# Patient Record
Sex: FEMALE | Race: WHITE | NOT HISPANIC OR LATINO | Employment: OTHER | URBAN - METROPOLITAN AREA
[De-identification: names, ages, dates, MRNs, and addresses within clinical notes are randomized per-mention and may not be internally consistent; named-entity substitution may affect disease eponyms.]

---

## 2017-01-27 ENCOUNTER — HISTORICAL (OUTPATIENT)
Dept: RADIOLOGY | Facility: HOSPITAL | Age: 82
End: 2017-01-27

## 2018-10-10 ENCOUNTER — HISTORICAL (OUTPATIENT)
Dept: ADMINISTRATIVE | Facility: HOSPITAL | Age: 83
End: 2018-10-10

## 2018-10-10 ENCOUNTER — HISTORICAL (OUTPATIENT)
Dept: LAB | Facility: HOSPITAL | Age: 83
End: 2018-10-10

## 2018-10-10 LAB
ABS NEUT (OLG): 9.6
APPEARANCE, UA: ABNORMAL
BACTERIA #/AREA URNS AUTO: ABNORMAL /HPF
BILIRUB UR QL STRIP: ABNORMAL MG/DL
COLOR UR: YELLOW
ERYTHROCYTE [DISTWIDTH] IN BLOOD BY AUTOMATED COUNT: 14.5 % (ref 11.5–17)
GLUCOSE (UA): NEGATIVE MG/DL
HCT VFR BLD AUTO: 50.9 % (ref 37–47)
HGB BLD-MCNC: 15.6 GM/DL (ref 12–16)
HGB UR QL STRIP: ABNORMAL UNIT/L
KETONES UR QL STRIP: ABNORMAL MG/DL
LEUKOCYTE ESTERASE UR QL STRIP: ABNORMAL UNIT/L
LYMPHOCYTES # BLD AUTO: 2.8 X10(3)/MCL (ref 0.6–3.4)
LYMPHOCYTES NFR BLD AUTO: 19.6 % (ref 13–40)
MCH RBC QN AUTO: 30.3 PG (ref 27–31.2)
MCHC RBC AUTO-ENTMCNC: 31 GM/DL (ref 32–36)
MCV RBC AUTO: 99 FL (ref 80–94)
MONOCYTES # BLD AUTO: 1.8 X10(3)/MCL (ref 0–1.8)
MONOCYTES NFR BLD AUTO: 12.9 % (ref 0.1–24)
NEUTROPHILS NFR BLD AUTO: 67.5 % (ref 47–80)
NITRITE UR QL STRIP.AUTO: NEGATIVE
PH UR STRIP: 6.5 [PH]
PLATELET # BLD AUTO: 302 X10(3)/MCL (ref 130–400)
PMV BLD AUTO: 8.5 FL
PROT UR QL STRIP: ABNORMAL MG/DL
RBC # BLD AUTO: 5.15 X10(6)/MCL (ref 4.2–5.4)
RBC #/AREA URNS HPF: ABNORMAL /HPF
SP GR UR STRIP: 1.02
SQUAMOUS EPITHELIAL, UA: ABNORMAL /LPF
UROBILINOGEN UR STRIP-ACNC: 0.2 MG/DL
WBC # SPEC AUTO: 14.2 X10(3)/MCL (ref 4.5–11.5)
WBC #/AREA URNS AUTO: ABNORMAL /[HPF]

## 2018-10-12 LAB — FINAL CULTURE: NO GROWTH

## 2018-11-12 ENCOUNTER — HISTORICAL (OUTPATIENT)
Dept: ADMINISTRATIVE | Facility: HOSPITAL | Age: 83
End: 2018-11-12

## 2018-12-05 ENCOUNTER — HISTORICAL (OUTPATIENT)
Dept: ADMINISTRATIVE | Facility: HOSPITAL | Age: 83
End: 2018-12-05

## 2018-12-05 LAB
ABS NEUT (OLG): 4.3 X10(3)/MCL (ref 2.1–9.2)
ALBUMIN SERPL-MCNC: 3.9 GM/DL (ref 3.4–5)
ALBUMIN/GLOB SERPL: 1.26 {RATIO} (ref 1.5–2.5)
ALP SERPL-CCNC: 70 UNIT/L (ref 38–126)
ALT SERPL-CCNC: 11 UNIT/L (ref 7–52)
APPEARANCE, UA: CLEAR
AST SERPL-CCNC: 16 UNIT/L (ref 15–37)
BACTERIA #/AREA URNS AUTO: ABNORMAL /HPF
BILIRUB SERPL-MCNC: 0.5 MG/DL (ref 0.2–1)
BILIRUB UR QL STRIP: ABNORMAL MG/DL
BILIRUBIN DIRECT+TOT PNL SERPL-MCNC: 0.1 MG/DL (ref 0–0.5)
BILIRUBIN DIRECT+TOT PNL SERPL-MCNC: 0.4 MG/DL
BUN SERPL-MCNC: 12 MG/DL (ref 7–18)
CALCIUM SERPL-MCNC: 8.9 MG/DL (ref 8.5–10)
CHLORIDE SERPL-SCNC: 108 MMOL/L (ref 98–107)
CHOLEST SERPL-MCNC: 153 MG/DL (ref 0–200)
CHOLEST/HDLC SERPL: 2.2 {RATIO}
CO2 SERPL-SCNC: 28 MMOL/L (ref 21–32)
COLOR UR: YELLOW
CREAT SERPL-MCNC: 0.74 MG/DL (ref 0.6–1.3)
ERYTHROCYTE [DISTWIDTH] IN BLOOD BY AUTOMATED COUNT: 13.7 % (ref 11.5–17)
GLOBULIN SER-MCNC: 3.1 GM/DL (ref 1.2–3)
GLUCOSE (UA): NEGATIVE MG/DL
GLUCOSE SERPL-MCNC: 119 MG/DL (ref 74–106)
HCT VFR BLD AUTO: 44 % (ref 37–47)
HDLC SERPL-MCNC: 68 MG/DL (ref 35–60)
HGB BLD-MCNC: 13.6 GM/DL (ref 12–16)
HGB UR QL STRIP: ABNORMAL UNIT/L
KETONES UR QL STRIP: ABNORMAL MG/DL
LDLC SERPL CALC-MCNC: 61 MG/DL (ref 0–129)
LEUKOCYTE ESTERASE UR QL STRIP: NEGATIVE UNIT/L
LYMPHOCYTES # BLD AUTO: 3 X10(3)/MCL (ref 0.6–3.4)
LYMPHOCYTES NFR BLD AUTO: 36.6 % (ref 13–40)
MCH RBC QN AUTO: 30.8 PG (ref 27–31.2)
MCHC RBC AUTO-ENTMCNC: 31 GM/DL (ref 32–36)
MCV RBC AUTO: 100 FL (ref 80–94)
MONOCYTES # BLD AUTO: 0.8 X10(3)/MCL (ref 0.1–1.3)
MONOCYTES NFR BLD AUTO: 9.4 % (ref 0.1–24)
NEUTROPHILS NFR BLD AUTO: 54 % (ref 47–80)
NITRITE UR QL STRIP.AUTO: NEGATIVE
PH UR STRIP: 6.5 [PH]
PLATELET # BLD AUTO: 267 X10(3)/MCL (ref 130–400)
PMV BLD AUTO: 9.2 FL (ref 9.4–12.4)
POTASSIUM SERPL-SCNC: 4.5 MMOL/L (ref 3.5–5.1)
PROT SERPL-MCNC: 7 GM/DL (ref 6.4–8.2)
PROT UR QL STRIP: ABNORMAL MG/DL
RBC # BLD AUTO: 4.41 X10(6)/MCL (ref 4.2–5.4)
RBC #/AREA URNS HPF: ABNORMAL /HPF
SODIUM SERPL-SCNC: 139 MMOL/L (ref 136–145)
SP GR UR STRIP: 1.02
SQUAMOUS EPITHELIAL, UA: ABNORMAL /LPF
TRIGL SERPL-MCNC: 77 MG/DL (ref 30–150)
UROBILINOGEN UR STRIP-ACNC: 2 MG/DL
VLDLC SERPL CALC-MCNC: 15.4 MG/DL
WBC # SPEC AUTO: 8.1 X10(3)/MCL (ref 4.5–11.5)
WBC #/AREA URNS AUTO: ABNORMAL /[HPF]

## 2018-12-10 ENCOUNTER — HISTORICAL (OUTPATIENT)
Dept: ADMINISTRATIVE | Facility: HOSPITAL | Age: 83
End: 2018-12-10

## 2018-12-10 LAB
APPEARANCE, UA: ABNORMAL
BACTERIA #/AREA URNS AUTO: ABNORMAL /HPF
BILIRUB UR QL STRIP: NEGATIVE MG/DL
COLOR UR: YELLOW
GLUCOSE (UA): NEGATIVE MG/DL
HGB UR QL STRIP: ABNORMAL UNIT/L
KETONES UR QL STRIP: ABNORMAL MG/DL
LEUKOCYTE ESTERASE UR QL STRIP: NEGATIVE UNIT/L
NITRITE UR QL STRIP.AUTO: NEGATIVE
PH UR STRIP: 6.5 [PH]
PROT UR QL STRIP: NEGATIVE MG/DL
RBC #/AREA URNS HPF: ABNORMAL /HPF
SP GR UR STRIP: 1.01
SQUAMOUS EPITHELIAL, UA: ABNORMAL /LPF
UROBILINOGEN UR STRIP-ACNC: 1 MG/DL
WBC #/AREA URNS AUTO: ABNORMAL /[HPF]

## 2019-01-21 ENCOUNTER — HISTORICAL (OUTPATIENT)
Dept: RADIOLOGY | Facility: HOSPITAL | Age: 84
End: 2019-01-21

## 2019-03-11 ENCOUNTER — HISTORICAL (OUTPATIENT)
Dept: ADMINISTRATIVE | Facility: HOSPITAL | Age: 84
End: 2019-03-11

## 2019-12-17 ENCOUNTER — HISTORICAL (OUTPATIENT)
Dept: ADMINISTRATIVE | Facility: HOSPITAL | Age: 84
End: 2019-12-17

## 2019-12-17 LAB
ALBUMIN SERPL-MCNC: 3.9 GM/DL (ref 3.4–5)
ALBUMIN/GLOB SERPL: 1.08 {RATIO} (ref 1.5–2.5)
ALP SERPL-CCNC: 78 UNIT/L (ref 38–126)
ALT SERPL-CCNC: 14 UNIT/L (ref 7–52)
APPEARANCE, UA: ABNORMAL
AST SERPL-CCNC: 17 UNIT/L (ref 15–37)
BACTERIA #/AREA URNS AUTO: ABNORMAL /HPF
BILIRUB SERPL-MCNC: 0.4 MG/DL (ref 0.2–1)
BILIRUB UR QL STRIP: NEGATIVE MG/DL
BILIRUBIN DIRECT+TOT PNL SERPL-MCNC: 0 MG/DL (ref 0–0.5)
BILIRUBIN DIRECT+TOT PNL SERPL-MCNC: 0.4 MG/DL
BUN SERPL-MCNC: 15 MG/DL (ref 7–18)
CALCIUM SERPL-MCNC: 9.2 MG/DL (ref 8.5–10)
CHLORIDE SERPL-SCNC: 106 MMOL/L (ref 98–107)
CO2 SERPL-SCNC: 27 MMOL/L (ref 21–32)
COLOR UR: YELLOW
CREAT SERPL-MCNC: 0.93 MG/DL (ref 0.6–1.3)
CREAT UR-MCNC: 200 MG/DL
DEPRECATED CALCIDIOL+CALCIFEROL SERPL-MC: 56.1 NG/ML (ref 30–80)
EST. AVERAGE GLUCOSE BLD GHB EST-MCNC: 123 MG/DL
GLOBULIN SER-MCNC: 3.6 GM/DL (ref 1.2–3)
GLUCOSE (UA): NEGATIVE MG/DL
GLUCOSE SERPL-MCNC: 117 MG/DL (ref 74–106)
HBA1C MFR BLD: 5.9 % (ref 4.4–6.4)
HGB UR QL STRIP: ABNORMAL UNIT/L
KETONES UR QL STRIP: NEGATIVE MG/DL
LEUKOCYTE ESTERASE UR QL STRIP: NEGATIVE UNIT/L
MICROALBUMIN UR-MCNC: 80 MG/L
MICROALBUMIN/CREAT RATIO PNL UR: ABNORMAL MG/GM
NITRITE UR QL STRIP.AUTO: NEGATIVE
PH UR STRIP: 6 [PH]
POTASSIUM SERPL-SCNC: 4.9 MMOL/L (ref 3.5–5.1)
PROT SERPL-MCNC: 7.5 GM/DL (ref 6.4–8.2)
PROT UR QL STRIP: NEGATIVE MG/DL
RBC #/AREA URNS HPF: ABNORMAL /HPF
SODIUM SERPL-SCNC: 139 MMOL/L (ref 136–145)
SP GR UR STRIP: 1.02
SQUAMOUS EPITHELIAL, UA: ABNORMAL /LPF
UROBILINOGEN UR STRIP-ACNC: 0.2 MG/DL
WBC #/AREA URNS AUTO: ABNORMAL /[HPF]

## 2020-09-23 ENCOUNTER — HISTORICAL (OUTPATIENT)
Dept: ADMINISTRATIVE | Facility: HOSPITAL | Age: 85
End: 2020-09-23

## 2020-12-09 ENCOUNTER — HISTORICAL (OUTPATIENT)
Dept: RADIATION THERAPY | Facility: HOSPITAL | Age: 85
End: 2020-12-09

## 2020-12-17 ENCOUNTER — HISTORICAL (OUTPATIENT)
Dept: RADIATION THERAPY | Facility: HOSPITAL | Age: 85
End: 2020-12-17

## 2020-12-21 ENCOUNTER — HISTORICAL (OUTPATIENT)
Dept: ADMINISTRATIVE | Facility: HOSPITAL | Age: 85
End: 2020-12-21

## 2020-12-21 LAB
ALBUMIN SERPL-MCNC: 3.7 GM/DL (ref 3.4–5)
ALBUMIN/GLOB SERPL: 1.16 {RATIO} (ref 1.5–2.5)
ALP SERPL-CCNC: 102 UNIT/L (ref 38–126)
ALT SERPL-CCNC: 13 UNIT/L (ref 7–52)
AST SERPL-CCNC: 19 UNIT/L (ref 15–37)
BILIRUB SERPL-MCNC: 0.5 MG/DL (ref 0.2–1)
BILIRUBIN DIRECT+TOT PNL SERPL-MCNC: 0.2 MG/DL (ref 0–0.5)
BILIRUBIN DIRECT+TOT PNL SERPL-MCNC: 0.3 MG/DL
BUN SERPL-MCNC: 10 MG/DL (ref 7–18)
CALCIUM SERPL-MCNC: 9.3 MG/DL (ref 8.5–10)
CHLORIDE SERPL-SCNC: 106 MMOL/L (ref 98–107)
CHOLEST SERPL-MCNC: 159 MG/DL (ref 0–200)
CHOLEST/HDLC SERPL: 2 {RATIO}
CO2 SERPL-SCNC: 28 MMOL/L (ref 21–32)
CREAT SERPL-MCNC: 0.65 MG/DL (ref 0.6–1.3)
EST. AVERAGE GLUCOSE BLD GHB EST-MCNC: 128 MG/DL
GLOBULIN SER-MCNC: 3.2 GM/DL (ref 1.2–3)
GLUCOSE SERPL-MCNC: 100 MG/DL (ref 74–106)
HBA1C MFR BLD: 6.1 % (ref 4.4–6.4)
HDLC SERPL-MCNC: 78 MG/DL (ref 35–60)
LDLC SERPL CALC-MCNC: 66 MG/DL (ref 0–129)
POTASSIUM SERPL-SCNC: 5.1 MMOL/L (ref 3.5–5.1)
PROT SERPL-MCNC: 6.9 GM/DL (ref 6.4–8.2)
SODIUM SERPL-SCNC: 141 MMOL/L (ref 136–145)
TRIGL SERPL-MCNC: 121 MG/DL (ref 30–150)
VLDLC SERPL CALC-MCNC: 24.2 MG/DL

## 2020-12-24 ENCOUNTER — HISTORICAL (OUTPATIENT)
Dept: RADIOLOGY | Facility: HOSPITAL | Age: 85
End: 2020-12-24

## 2020-12-28 ENCOUNTER — HISTORICAL (OUTPATIENT)
Dept: RADIATION THERAPY | Facility: HOSPITAL | Age: 85
End: 2020-12-28

## 2020-12-29 ENCOUNTER — HISTORICAL (OUTPATIENT)
Dept: RADIATION THERAPY | Facility: HOSPITAL | Age: 85
End: 2020-12-29

## 2020-12-31 ENCOUNTER — HISTORICAL (OUTPATIENT)
Dept: RADIATION THERAPY | Facility: HOSPITAL | Age: 85
End: 2020-12-31

## 2021-01-04 ENCOUNTER — HISTORICAL (OUTPATIENT)
Dept: RADIATION THERAPY | Facility: HOSPITAL | Age: 86
End: 2021-01-04

## 2021-01-05 ENCOUNTER — HISTORICAL (OUTPATIENT)
Dept: RADIATION THERAPY | Facility: HOSPITAL | Age: 86
End: 2021-01-05

## 2021-01-06 ENCOUNTER — HISTORICAL (OUTPATIENT)
Dept: RADIATION THERAPY | Facility: HOSPITAL | Age: 86
End: 2021-01-06

## 2021-01-08 ENCOUNTER — HISTORICAL (OUTPATIENT)
Dept: RADIATION THERAPY | Facility: HOSPITAL | Age: 86
End: 2021-01-08

## 2021-01-11 ENCOUNTER — HISTORICAL (OUTPATIENT)
Dept: RADIATION THERAPY | Facility: HOSPITAL | Age: 86
End: 2021-01-11

## 2021-04-21 ENCOUNTER — HISTORICAL (OUTPATIENT)
Dept: ADMINISTRATIVE | Facility: HOSPITAL | Age: 86
End: 2021-04-21

## 2021-04-21 LAB — POC CREATININE: 0.8 MG/DL (ref 0.6–1.3)

## 2021-04-23 ENCOUNTER — HISTORICAL (OUTPATIENT)
Dept: RADIATION THERAPY | Facility: HOSPITAL | Age: 86
End: 2021-04-23

## 2021-07-12 ENCOUNTER — HISTORICAL (OUTPATIENT)
Dept: ADMINISTRATIVE | Facility: HOSPITAL | Age: 86
End: 2021-07-12

## 2021-07-12 LAB
ABS NEUT (OLG): 5.18 X10(3)/MCL (ref 2.1–9.2)
BASOPHILS # BLD AUTO: 0.1 X10(3)/MCL (ref 0–0.2)
BASOPHILS NFR BLD AUTO: 1 %
EOSINOPHIL # BLD AUTO: 0.3 X10(3)/MCL (ref 0–0.9)
EOSINOPHIL NFR BLD AUTO: 3 %
ERYTHROCYTE [DISTWIDTH] IN BLOOD BY AUTOMATED COUNT: 16 % (ref 11.5–17)
HCT VFR BLD AUTO: 40.1 % (ref 37–47)
HGB BLD-MCNC: 12.4 GM/DL (ref 12–16)
LYMPHOCYTES # BLD AUTO: 2.1 X10(3)/MCL (ref 0.6–4.6)
LYMPHOCYTES NFR BLD AUTO: 25 %
MCH RBC QN AUTO: 29.4 PG (ref 27–31)
MCHC RBC AUTO-ENTMCNC: 30.9 GM/DL (ref 33–36)
MCV RBC AUTO: 95 FL (ref 80–94)
MONOCYTES # BLD AUTO: 0.8 X10(3)/MCL (ref 0.1–1.3)
MONOCYTES NFR BLD AUTO: 10 %
NEUTROPHILS # BLD AUTO: 5.18 X10(3)/MCL (ref 2.1–9.2)
NEUTROPHILS NFR BLD AUTO: 61 %
PLATELET # BLD AUTO: 315 X10(3)/MCL (ref 130–400)
PMV BLD AUTO: 9.5 FL (ref 9.4–12.4)
RBC # BLD AUTO: 4.22 X10(6)/MCL (ref 4.2–5.4)
VIT B12 SERPL-MCNC: 1174 PG/ML (ref 213–816)
WBC # SPEC AUTO: 8.5 X10(3)/MCL (ref 4.5–11.5)

## 2021-08-03 ENCOUNTER — HISTORICAL (OUTPATIENT)
Dept: ADMINISTRATIVE | Facility: HOSPITAL | Age: 86
End: 2021-08-03

## 2021-08-03 LAB
BUN SERPL-MCNC: 12 MG/DL (ref 7–18)
CALCIUM SERPL-MCNC: 9.3 MG/DL (ref 8.5–10)
CHLORIDE SERPL-SCNC: 104 MMOL/L (ref 98–107)
CO2 SERPL-SCNC: 26 MMOL/L (ref 21–32)
CREAT SERPL-MCNC: 0.65 MG/DL (ref 0.6–1.3)
CREAT/UREA NIT SERPL: 18.5
FERRITIN SERPL-MCNC: 56.81 NG/ML (ref 4.63–204)
GLUCOSE SERPL-MCNC: 116 MG/DL (ref 74–106)
POTASSIUM SERPL-SCNC: 6 MMOL/L (ref 3.5–5.1)
SODIUM SERPL-SCNC: 138 MMOL/L (ref 136–145)

## 2021-10-08 ENCOUNTER — HISTORICAL (OUTPATIENT)
Dept: RADIOLOGY | Facility: HOSPITAL | Age: 86
End: 2021-10-08

## 2021-10-13 ENCOUNTER — HISTORICAL (OUTPATIENT)
Dept: RADIATION THERAPY | Facility: HOSPITAL | Age: 86
End: 2021-10-13

## 2021-11-09 ENCOUNTER — HISTORICAL (OUTPATIENT)
Dept: ADMINISTRATIVE | Facility: HOSPITAL | Age: 86
End: 2021-11-09

## 2021-11-09 LAB
BUN SERPL-MCNC: 16.3 MG/DL (ref 9.8–20.1)
CALCIUM SERPL-MCNC: 8.8 MG/DL (ref 8.7–10.5)
CHLORIDE SERPL-SCNC: 108 MMOL/L (ref 98–107)
CO2 SERPL-SCNC: 27 MMOL/L (ref 23–31)
CREAT SERPL-MCNC: 0.74 MG/DL (ref 0.55–1.02)
CREAT/UREA NIT SERPL: 22
GLUCOSE SERPL-MCNC: 87 MG/DL (ref 82–115)
POTASSIUM SERPL-SCNC: 3.9 MMOL/L (ref 3.5–5.1)
SODIUM SERPL-SCNC: 144 MMOL/L (ref 136–145)

## 2021-12-13 LAB
INFLUENZA A ANTIGEN, POC: NEGATIVE
INFLUENZA B ANTIGEN, POC: NEGATIVE
SARS-COV-2 RNA RESP QL NAA+PROBE: NEGATIVE

## 2021-12-21 LAB
APPEARANCE, UA: CLEAR
BACTERIA SPEC CULT: NORMAL
BILIRUB UR QL STRIP: NEGATIVE
COLOR UR: YELLOW
GLUCOSE (UA): NEGATIVE
HGB UR QL STRIP: NEGATIVE
KETONES UR QL STRIP: NEGATIVE
LEUKOCYTE ESTERASE UR QL STRIP: NEGATIVE
NITRITE UR QL STRIP: NEGATIVE
PH UR STRIP: 7 [PH] (ref 5–9)
PROT UR QL STRIP: NEGATIVE
RBC #/AREA URNS HPF: NORMAL /[HPF]
SARS-COV-2 AG RESP QL IA.RAPID: NEGATIVE
SP GR UR STRIP: 1.02 (ref 1–1.03)
SQUAMOUS EPITHELIAL, UA: NORMAL
UROBILINOGEN UR STRIP-ACNC: 1
WBC #/AREA URNS HPF: NORMAL /HPF

## 2021-12-22 LAB
EST. AVERAGE GLUCOSE BLD GHB EST-MCNC: 131.2 MG/DL
HBA1C MFR BLD: 6.2 %

## 2021-12-28 ENCOUNTER — HISTORICAL (OUTPATIENT)
Dept: ADMINISTRATIVE | Facility: HOSPITAL | Age: 86
End: 2021-12-28

## 2022-01-06 LAB
ABS NEUT (OLG): 1.1 X10(3)/MCL (ref 2.1–9.2)
ALBUMIN SERPL-MCNC: 2.2 GM/DL (ref 3.4–4.8)
ALBUMIN/GLOB SERPL: 0.5 RATIO (ref 1.1–2)
ALP SERPL-CCNC: 88 UNIT/L (ref 40–150)
ALT SERPL-CCNC: 13 UNIT/L (ref 0–55)
AST SERPL-CCNC: 27 UNIT/L (ref 5–34)
BASOPHILS # BLD AUTO: 0 X10(3)/MCL (ref 0–0.2)
BASOPHILS NFR BLD AUTO: 0 %
BILIRUB SERPL-MCNC: 0.2 MG/DL
BILIRUBIN DIRECT+TOT PNL SERPL-MCNC: 0.1 MG/DL (ref 0–0.5)
BILIRUBIN DIRECT+TOT PNL SERPL-MCNC: 0.1 MG/DL (ref 0–0.8)
BUN SERPL-MCNC: 12 MG/DL (ref 9.8–20.1)
CALCIUM SERPL-MCNC: 9 MG/DL (ref 8.7–10.5)
CHLORIDE SERPL-SCNC: 101 MMOL/L (ref 98–107)
CO2 SERPL-SCNC: 29 MMOL/L (ref 23–31)
CREAT SERPL-MCNC: 0.72 MG/DL (ref 0.55–1.02)
EOSINOPHIL # BLD AUTO: 0.2 X10(3)/MCL (ref 0–0.9)
EOSINOPHIL NFR BLD AUTO: 5 %
ERYTHROCYTE [DISTWIDTH] IN BLOOD BY AUTOMATED COUNT: 15.5 % (ref 11.5–17)
GLOBULIN SER-MCNC: 4.3 GM/DL (ref 2.4–3.5)
GLUCOSE SERPL-MCNC: 96 MG/DL (ref 82–115)
HCT VFR BLD AUTO: 36.4 % (ref 37–47)
HGB BLD-MCNC: 11 GM/DL (ref 12–16)
LYMPHOCYTES # BLD AUTO: 1.5 X10(3)/MCL (ref 0.6–4.6)
LYMPHOCYTES NFR BLD AUTO: 48 %
MAGNESIUM SERPL-MCNC: 2.1 MG/DL (ref 1.6–2.6)
MCH RBC QN AUTO: 27.7 PG (ref 27–31)
MCHC RBC AUTO-ENTMCNC: 30.2 GM/DL (ref 33–36)
MCV RBC AUTO: 91.7 FL (ref 80–94)
MONOCYTES # BLD AUTO: 0.3 X10(3)/MCL (ref 0.1–1.3)
MONOCYTES NFR BLD AUTO: 11 %
NEUTROPHILS # BLD AUTO: 1.1 X10(3)/MCL (ref 1.4–7.9)
NEUTROPHILS NFR BLD AUTO: 36 %
PLATELET # BLD AUTO: 346 X10(3)/MCL (ref 130–400)
PMV BLD AUTO: 9.3 FL (ref 9.4–12.4)
POTASSIUM SERPL-SCNC: 4.2 MMOL/L (ref 3.5–5.1)
PROT SERPL-MCNC: 6.5 GM/DL (ref 5.8–7.6)
RBC # BLD AUTO: 3.97 X10(6)/MCL (ref 4.2–5.4)
SODIUM SERPL-SCNC: 136 MMOL/L (ref 136–145)
WBC # SPEC AUTO: 3.1 X10(3)/MCL (ref 4.5–11.5)

## 2022-01-10 LAB
ABS NEUT (OLG): 2.02 X10(3)/MCL (ref 2.1–9.2)
ALBUMIN SERPL-MCNC: 2.4 GM/DL (ref 3.4–4.8)
ALBUMIN/GLOB SERPL: 0.6 RATIO (ref 1.1–2)
ALP SERPL-CCNC: 85 UNIT/L (ref 40–150)
ALT SERPL-CCNC: 14 UNIT/L (ref 0–55)
AST SERPL-CCNC: 15 UNIT/L (ref 5–34)
BASOPHILS # BLD AUTO: 0 X10(3)/MCL (ref 0–0.2)
BASOPHILS NFR BLD AUTO: 1 %
BILIRUB SERPL-MCNC: 0.3 MG/DL
BILIRUBIN DIRECT+TOT PNL SERPL-MCNC: 0.1 MG/DL (ref 0–0.8)
BILIRUBIN DIRECT+TOT PNL SERPL-MCNC: 0.2 MG/DL (ref 0–0.5)
BUN SERPL-MCNC: 11.8 MG/DL (ref 9.8–20.1)
CALCIUM SERPL-MCNC: 8.6 MG/DL (ref 8.7–10.5)
CHLORIDE SERPL-SCNC: 108 MMOL/L (ref 98–107)
CO2 SERPL-SCNC: 25 MMOL/L (ref 23–31)
CREAT SERPL-MCNC: 0.62 MG/DL (ref 0.55–1.02)
EOSINOPHIL # BLD AUTO: 0.2 X10(3)/MCL (ref 0–0.9)
EOSINOPHIL NFR BLD AUTO: 5 %
ERYTHROCYTE [DISTWIDTH] IN BLOOD BY AUTOMATED COUNT: 15.7 % (ref 11.5–17)
GLOBULIN SER-MCNC: 3.8 GM/DL (ref 2.4–3.5)
GLUCOSE SERPL-MCNC: 88 MG/DL (ref 82–115)
HCT VFR BLD AUTO: 35.5 % (ref 37–47)
HGB BLD-MCNC: 10.5 GM/DL (ref 12–16)
LYMPHOCYTES # BLD AUTO: 1.8 X10(3)/MCL (ref 0.6–4.6)
LYMPHOCYTES NFR BLD AUTO: 40 %
MCH RBC QN AUTO: 27.2 PG (ref 27–31)
MCHC RBC AUTO-ENTMCNC: 29.6 GM/DL (ref 33–36)
MCV RBC AUTO: 92 FL (ref 80–94)
MONOCYTES # BLD AUTO: 0.5 X10(3)/MCL (ref 0.1–1.3)
MONOCYTES NFR BLD AUTO: 11 %
NEUTROPHILS # BLD AUTO: 2.02 X10(3)/MCL (ref 1.4–7.9)
NEUTROPHILS NFR BLD AUTO: 43 %
PLATELET # BLD AUTO: 357 X10(3)/MCL (ref 130–400)
PMV BLD AUTO: 9.1 FL (ref 9.4–12.4)
POTASSIUM SERPL-SCNC: 3.7 MMOL/L (ref 3.5–5.1)
PROT SERPL-MCNC: 6.2 GM/DL (ref 5.8–7.6)
RBC # BLD AUTO: 3.86 X10(6)/MCL (ref 4.2–5.4)
SODIUM SERPL-SCNC: 142 MMOL/L (ref 136–145)
WBC # SPEC AUTO: 4.7 X10(3)/MCL (ref 4.5–11.5)

## 2022-02-04 ENCOUNTER — HISTORICAL (OUTPATIENT)
Dept: RADIOLOGY | Facility: HOSPITAL | Age: 87
End: 2022-02-04

## 2022-02-04 LAB — CBG: 99 (ref 70–115)

## 2022-02-07 ENCOUNTER — HISTORICAL (OUTPATIENT)
Dept: ADMINISTRATIVE | Facility: HOSPITAL | Age: 87
End: 2022-02-07

## 2022-02-10 ENCOUNTER — HISTORICAL (OUTPATIENT)
Dept: RADIATION THERAPY | Facility: HOSPITAL | Age: 87
End: 2022-02-10

## 2022-03-15 ENCOUNTER — HISTORICAL (OUTPATIENT)
Dept: ADMINISTRATIVE | Facility: HOSPITAL | Age: 87
End: 2022-03-15

## 2022-03-15 LAB
ABS NEUT (OLG): 3.42 (ref 2.1–9.2)
ALBUMIN SERPL-MCNC: 3.3 G/DL (ref 3.4–4.8)
ALBUMIN/GLOB SERPL: 0.7 {RATIO} (ref 1.1–2)
ALP SERPL-CCNC: 96 U/L (ref 40–150)
ALT SERPL-CCNC: 13 U/L (ref 0–55)
AST SERPL-CCNC: 18 U/L (ref 5–34)
BASOPHILS # BLD AUTO: 0 10*3/UL (ref 0–0.2)
BASOPHILS NFR BLD AUTO: 0 %
BILIRUB SERPL-MCNC: 0.6 MG/DL
BILIRUBIN DIRECT+TOT PNL SERPL-MCNC: 0.3 (ref 0–0.5)
BILIRUBIN DIRECT+TOT PNL SERPL-MCNC: 0.3 (ref 0–0.8)
BUN SERPL-MCNC: 11.1 MG/DL (ref 9.8–20.1)
CALCIUM SERPL-MCNC: 9.7 MG/DL (ref 8.7–10.5)
CHLORIDE SERPL-SCNC: 108 MMOL/L (ref 98–107)
CK SERPL-CCNC: 53 U/L (ref 29–168)
CO2 SERPL-SCNC: 26 MMOL/L (ref 23–31)
CREAT SERPL-MCNC: 0.71 MG/DL (ref 0.55–1.02)
EOSINOPHIL # BLD AUTO: 0 10*3/UL (ref 0–0.9)
EOSINOPHIL NFR BLD AUTO: 1 %
ERYTHROCYTE [DISTWIDTH] IN BLOOD BY AUTOMATED COUNT: 17.2 % (ref 11.5–17)
GLOBULIN SER-MCNC: 4.6 G/DL (ref 2.4–3.5)
GLUCOSE SERPL-MCNC: 106 MG/DL (ref 82–115)
HCT VFR BLD AUTO: 38.7 % (ref 37–47)
HEMOLYSIS INTERF INDEX SERPL-ACNC: 3
HGB BLD-MCNC: 12.1 G/DL (ref 12–16)
ICTERIC INTERF INDEX SERPL-ACNC: 1
LIPEMIC INTERF INDEX SERPL-ACNC: 1
LYMPHOCYTES # BLD AUTO: 1.8 10*3/UL (ref 0.6–4.6)
LYMPHOCYTES NFR BLD AUTO: 31 %
MANUAL DIFF? (OHS): NO
MCH RBC QN AUTO: 28.4 PG (ref 27–31)
MCHC RBC AUTO-ENTMCNC: 31.3 G/DL (ref 33–36)
MCV RBC AUTO: 90.8 FL (ref 80–94)
MONOCYTES # BLD AUTO: 0.5 10*3/UL (ref 0.1–1.3)
MONOCYTES NFR BLD AUTO: 9 %
NEUTROPHILS # BLD AUTO: 3.42 10*3/UL (ref 2.1–9.2)
NEUTROPHILS NFR BLD AUTO: 58 %
PLATELET # BLD AUTO: 358 10*3/UL (ref 130–400)
PMV BLD AUTO: 10.5 FL (ref 9.4–12.4)
POTASSIUM SERPL-SCNC: 3.9 MMOL/L (ref 3.5–5.1)
PROT SERPL-MCNC: 7.9 G/DL (ref 5.8–7.6)
RBC # BLD AUTO: 4.26 10*6/UL (ref 4.2–5.4)
SODIUM SERPL-SCNC: 145 MMOL/L (ref 136–145)
WBC # SPEC AUTO: 5.9 10*3/UL (ref 4.5–11.5)

## 2022-03-16 ENCOUNTER — HOSPITAL ENCOUNTER (OUTPATIENT)
Dept: MEDSURG UNIT | Facility: HOSPITAL | Age: 87
End: 2022-03-18
Attending: INTERNAL MEDICINE | Admitting: INTERNAL MEDICINE

## 2022-03-16 ENCOUNTER — HISTORICAL (OUTPATIENT)
Dept: ADMINISTRATIVE | Facility: HOSPITAL | Age: 87
End: 2022-03-16

## 2022-03-16 LAB — BNP BLD-MCNC: 673 PG/ML

## 2022-03-17 ENCOUNTER — HISTORICAL (OUTPATIENT)
Dept: ADMINISTRATIVE | Facility: HOSPITAL | Age: 87
End: 2022-03-17

## 2022-03-17 LAB
ABS NEUT (OLG): 7.46 (ref 2.1–9.2)
ALBUMIN SERPL-MCNC: 2.8 G/DL (ref 3.4–4.8)
ALBUMIN/GLOB SERPL: 0.8 {RATIO} (ref 1.1–2)
ALP SERPL-CCNC: 90 U/L (ref 40–150)
ALT SERPL-CCNC: 15 U/L (ref 0–55)
AST SERPL-CCNC: 25 U/L (ref 5–34)
BASOPHILS # BLD AUTO: 0 10*3/UL (ref 0–0.2)
BASOPHILS NFR BLD AUTO: 0 %
BILIRUB SERPL-MCNC: 0.6 MG/DL
BILIRUBIN DIRECT+TOT PNL SERPL-MCNC: 0.3 (ref 0–0.5)
BILIRUBIN DIRECT+TOT PNL SERPL-MCNC: 0.3 (ref 0–0.8)
BNP BLD-MCNC: 1059.7 PG/ML
BUN SERPL-MCNC: 12 MG/DL (ref 9.8–20.1)
CALCIUM SERPL-MCNC: 8.6 MG/DL (ref 8.7–10.5)
CHLORIDE SERPL-SCNC: 109 MMOL/L (ref 98–107)
CO2 SERPL-SCNC: 23 MMOL/L (ref 23–31)
CREAT SERPL-MCNC: 0.71 MG/DL (ref 0.55–1.02)
ERYTHROCYTE [DISTWIDTH] IN BLOOD BY AUTOMATED COUNT: 17 % (ref 11.5–17)
GLOBULIN SER-MCNC: 3.4 G/DL (ref 2.4–3.5)
GLUCOSE SERPL-MCNC: 92 MG/DL (ref 82–115)
HCT VFR BLD AUTO: 35.7 % (ref 37–47)
HEMOLYSIS INTERF INDEX SERPL-ACNC: 5
HEMOLYSIS INTERF INDEX SERPL-ACNC: 5
HGB BLD-MCNC: 11 G/DL (ref 12–16)
ICTERIC INTERF INDEX SERPL-ACNC: 0
IMM GRANULOCYTES # BLD AUTO: 0.01 10*3/UL (ref 0–0.02)
IMM GRANULOCYTES NFR BLD AUTO: 0.1 % (ref 0–0.43)
LIPEMIC INTERF INDEX SERPL-ACNC: <0
LYMPHOCYTES # BLD AUTO: 0.6 10*3/UL (ref 0.6–4.6)
LYMPHOCYTES NFR BLD AUTO: 7 %
MAGNESIUM SERPL-MCNC: 1.9 MG/DL (ref 1.6–2.6)
MANUAL DIFF? (OHS): NO
MCH RBC QN AUTO: 27.6 PG (ref 27–31)
MCHC RBC AUTO-ENTMCNC: 30.8 G/DL (ref 33–36)
MCV RBC AUTO: 89.5 FL (ref 80–94)
MONOCYTES # BLD AUTO: 0.6 10*3/UL (ref 0.1–1.3)
MONOCYTES NFR BLD AUTO: 7 %
NEUTROPHILS # BLD AUTO: 7.46 10*3/UL (ref 1.4–7.9)
NEUTROPHILS NFR BLD AUTO: 86 %
PLATELET # BLD AUTO: 299 10*3/UL (ref 130–400)
PMV BLD AUTO: 9.7 FL (ref 9.4–12.4)
POTASSIUM SERPL-SCNC: 3.6 MMOL/L (ref 3.5–5.1)
PROT SERPL-MCNC: 6.2 G/DL (ref 5.8–7.6)
RBC # BLD AUTO: 3.99 10*6/UL (ref 4.2–5.4)
SODIUM SERPL-SCNC: 144 MMOL/L (ref 136–145)
TROPONIN I SERPL-MCNC: 0.48 NG/ML (ref 0–0.04)
TROPONIN I SERPL-MCNC: 0.5 NG/ML (ref 0–0.04)
WBC # SPEC AUTO: 8.7 10*3/UL (ref 4.5–11.5)

## 2022-03-18 LAB
BNP BLD-MCNC: 1156.7 PG/ML
BUN SERPL-MCNC: 13.4 MG/DL (ref 9.8–20.1)
CALCIUM SERPL-MCNC: 8.9 MG/DL (ref 8.7–10.5)
CHLORIDE SERPL-SCNC: 104 MMOL/L (ref 98–107)
CO2 SERPL-SCNC: 28 MMOL/L (ref 23–31)
CREAT SERPL-MCNC: 0.82 MG/DL (ref 0.55–1.02)
CREAT/UREA NIT SERPL: 16
GLUCOSE SERPL-MCNC: 138 MG/DL (ref 82–115)
HEMOLYSIS INTERF INDEX SERPL-ACNC: 2
HEMOLYSIS INTERF INDEX SERPL-ACNC: 2
ICTERIC INTERF INDEX SERPL-ACNC: 0
LIPEMIC INTERF INDEX SERPL-ACNC: 2
MAGNESIUM SERPL-MCNC: 1.9 MG/DL (ref 1.6–2.6)
POTASSIUM SERPL-SCNC: 3.8 MMOL/L (ref 3.5–5.1)
SODIUM SERPL-SCNC: 145 MMOL/L (ref 136–145)
TROPONIN I SERPL-MCNC: 0.26 NG/ML (ref 0–0.04)

## 2022-03-19 LAB
BNP BLD-MCNC: 574.8 PG/ML
BUN SERPL-MCNC: 22.6 MG/DL (ref 9.8–20.1)
CALCIUM SERPL-MCNC: 9.6 MG/DL (ref 8.7–10.5)
CHLORIDE SERPL-SCNC: 100 MMOL/L (ref 98–107)
CO2 SERPL-SCNC: 29 MMOL/L (ref 23–31)
CREAT SERPL-MCNC: 0.94 MG/DL (ref 0.55–1.02)
CREAT/UREA NIT SERPL: 24
GLUCOSE SERPL-MCNC: 117 MG/DL (ref 82–115)
HEMOLYSIS INTERF INDEX SERPL-ACNC: 1
ICTERIC INTERF INDEX SERPL-ACNC: 1
LIPEMIC INTERF INDEX SERPL-ACNC: 1
POTASSIUM SERPL-SCNC: 3.7 MMOL/L (ref 3.5–5.1)
SODIUM SERPL-SCNC: 144 MMOL/L (ref 136–145)

## 2022-03-21 ENCOUNTER — HISTORICAL (OUTPATIENT)
Dept: ADMINISTRATIVE | Facility: HOSPITAL | Age: 87
End: 2022-03-21

## 2022-03-23 ENCOUNTER — HISTORICAL (OUTPATIENT)
Dept: ADMINISTRATIVE | Facility: HOSPITAL | Age: 87
End: 2022-03-23

## 2022-03-23 LAB
BNP BLD-MCNC: 208.9 PG/ML
BUN SERPL-MCNC: 32.9 MG/DL (ref 9.8–20.1)
CALCIUM SERPL-MCNC: 9.2 MG/DL (ref 8.7–10.5)
CHLORIDE SERPL-SCNC: 96 MMOL/L (ref 98–107)
CO2 SERPL-SCNC: 30 MMOL/L (ref 23–31)
CREAT SERPL-MCNC: 0.85 MG/DL (ref 0.55–1.02)
CREAT/UREA NIT SERPL: 39
GLUCOSE SERPL-MCNC: 132 MG/DL (ref 82–115)
HEMOLYSIS INTERF INDEX SERPL-ACNC: 1
HEMOLYSIS INTERF INDEX SERPL-ACNC: 1
ICTERIC INTERF INDEX SERPL-ACNC: 0
INR PPP: 1.04 (ref 2–3)
LIPEMIC INTERF INDEX SERPL-ACNC: 0
MAGNESIUM SERPL-MCNC: 2.2 MG/DL (ref 1.6–2.6)
POTASSIUM SERPL-SCNC: 4.5 MMOL/L (ref 3.5–5.1)
PROTHROMBIN TIME: 10.9 S (ref 9.3–11.4)
SODIUM SERPL-SCNC: 137 MMOL/L (ref 136–145)

## 2022-03-29 LAB
ABS NEUT (OLG): 4.69 (ref 2.1–9.2)
ALBUMIN SERPL-MCNC: 2.4 G/DL (ref 3.4–4.8)
ALBUMIN/GLOB SERPL: 0.8 {RATIO} (ref 1.1–2)
ALP SERPL-CCNC: 92 U/L (ref 40–150)
ALT SERPL-CCNC: 18 U/L (ref 0–55)
AST SERPL-CCNC: 15 U/L (ref 5–34)
BASOPHILS # BLD AUTO: 0 10*3/UL (ref 0–0.2)
BASOPHILS NFR BLD AUTO: 0 %
BILIRUB SERPL-MCNC: 0.6 MG/DL
BUN SERPL-MCNC: 24.5 MG/DL (ref 9.8–20.1)
CALCIUM SERPL-MCNC: 8.7 MG/DL (ref 8.7–10.5)
CHLORIDE SERPL-SCNC: 98 MMOL/L (ref 98–107)
CO2 SERPL-SCNC: 27 MMOL/L (ref 23–31)
CREAT SERPL-MCNC: 0.9 MG/DL (ref 0.55–1.02)
EOSINOPHIL # BLD AUTO: 0.2 10*3/UL (ref 0–0.9)
EOSINOPHIL NFR BLD AUTO: 2 %
ERYTHROCYTE [DISTWIDTH] IN BLOOD BY AUTOMATED COUNT: 15.8 % (ref 11.5–17)
GLOBULIN SER-MCNC: 3.2 G/DL (ref 2.4–3.5)
GLUCOSE SERPL-MCNC: 100 MG/DL (ref 82–115)
HCT VFR BLD AUTO: 42.6 % (ref 37–47)
HEMOLYSIS INTERF INDEX SERPL-ACNC: 3
HEMOLYSIS INTERF INDEX SERPL-ACNC: 3
HGB BLD-MCNC: 13.1 G/DL (ref 12–16)
ICTERIC INTERF INDEX SERPL-ACNC: 1
IMM GRANULOCYTES # BLD AUTO: 0.01 10*3/UL (ref 0–0.02)
IMM GRANULOCYTES NFR BLD AUTO: 0.1 % (ref 0–0.43)
LIPEMIC INTERF INDEX SERPL-ACNC: 5
LYMPHOCYTES # BLD AUTO: 2.1 10*3/UL (ref 0.6–4.6)
LYMPHOCYTES NFR BLD AUTO: 27 %
MAGNESIUM SERPL-MCNC: 2.2 MG/DL (ref 1.6–2.6)
MANUAL DIFF? (OHS): NO
MCH RBC QN AUTO: 27.3 PG (ref 27–31)
MCHC RBC AUTO-ENTMCNC: 30.8 G/DL (ref 33–36)
MCV RBC AUTO: 88.8 FL (ref 80–94)
MONOCYTES # BLD AUTO: 0.8 10*3/UL (ref 0.1–1.3)
MONOCYTES NFR BLD AUTO: 10 %
NEUTROPHILS # BLD AUTO: 4.69 10*3/UL (ref 1.4–7.9)
NEUTROPHILS NFR BLD AUTO: 60 %
PLATELET # BLD AUTO: 268 10*3/UL (ref 130–400)
PMV BLD AUTO: 9.6 FL (ref 9.4–12.4)
POTASSIUM SERPL-SCNC: 3.8 MMOL/L (ref 3.5–5.1)
PROT SERPL-MCNC: 5.6 G/DL (ref 5.8–7.6)
RBC # BLD AUTO: 4.8 10*6/UL (ref 4.2–5.4)
SODIUM SERPL-SCNC: 136 MMOL/L (ref 136–145)
WBC # SPEC AUTO: 7.8 10*3/UL (ref 4.5–11.5)

## 2022-04-10 ENCOUNTER — HISTORICAL (OUTPATIENT)
Dept: ADMINISTRATIVE | Facility: HOSPITAL | Age: 87
End: 2022-04-10
Payer: MEDICARE

## 2022-04-26 VITALS
WEIGHT: 137 LBS | HEIGHT: 62 IN | OXYGEN SATURATION: 94 % | DIASTOLIC BLOOD PRESSURE: 68 MMHG | SYSTOLIC BLOOD PRESSURE: 122 MMHG | BODY MASS INDEX: 25.21 KG/M2

## 2022-04-28 DIAGNOSIS — J44.9 CHRONIC OBSTRUCTIVE PULMONARY DISEASE, UNSPECIFIED COPD TYPE: ICD-10-CM

## 2022-04-28 DIAGNOSIS — R60.0 LOWER EXTREMITY EDEMA: Primary | ICD-10-CM

## 2022-05-03 NOTE — HISTORICAL [facility] CERNER
This is a historical note converted from Juan R. Formatting and pictures may have been removed.  Please reference Juan R for original formatting and attached multimedia. Chief Complaint  CPX  History of Present Illness  85 y/o female here for wellness  new diagnosis- adenocarcinoma of lung  has PET scan [facility] this week[facility]  had flu vaccine this season  has most labs checked with endo  says RLS medication not working as weel as it did in the past, not wanting to change medication at this time  ?  ?  ?  ?  ?  ?     female here for left neck pain that has been chronic and intermittent  says the pain is wore at night.  did see Dr. sloan in the past for headaches. said she would get injections in her her neck for the headaches.  denies curretn headaches.  says it fells like a muscle spasm in her left neck  daily spasm for over a year, progressively worsening  no previous injury  takes tylenol and topical rubs  noted on chart review she is precribed baclofen, takes nightly  ?   dr. collins ENT  Dr. Key- GI  Dr. james- cardiology  Dr. Lopez- endo  ?  ?  ?  ?    female here for wellness  sees dr. lopez- she says he stopped reclast and monitors sugar.  Dr. john -once a year  Dr. key- GI  mammogram- Dr. de jesus  quit smoking 2 years ago.  she rick some walking for exercise  her sons live next door.  she cooks and cleans but no longer drives  ?  ?  Dr. Escamilla hx;  [facility] -?Dr. Hatch ?eye exam s/p cataracts  ???? Dr. Sloan prn - headaches/RLS/Neuropathy  CHEST? - COPD/former smoker  C/V - Dr. John-heladio/Coley-prn ??- decreased left radial pulse/SVT/Hyperchol  GI? - Dr Key colonoscope polyp - repeat prn  ???? EGD -  esophagitis/gastritis - yrly??????? s/p gastrectomy-ulcer  ????? 10/10/18 Diverticulitis   - nocturia  M/S - Dr. Zavala - s/p bilat tot. knees?? 2016 Fx ankle  HYPOGLYCEMIA - Dr. John Lopez  GYN - Dr. Vonnie leija?- ??? Left ovary  lesion -tests neg  OSTEOPOROSIS - Dr. John Lopez q 6? mos - Reclast  VITAMIN D DEF. -?? ??? ???????  [1] [1]  Review of Systems  Constitutional: No fever, No chills, No sweats, No weakness  ?????Eye: No blurring, No double vision.  ?????Ear/Nose/Mouth/Throat: No nasal congestion, No sore throat.  ?????Respiratory: exertional shortness of breath, No wheezing, No cyanosis.  ?????Cardiovascular: No chest pain, No palpitations, No bradycardia, No tachycardia, No peripheral edema.  ?????Gastrointestinal: No nausea, No vomiting, No diarrhea, No constipation, No abdominal pain.  ?????Genitourinary: No dysuria, No hematuria.  ?????Musculoskeletal: No back pain, No neck pain, No joint pain, No muscle pain, No claudication.  ?????Integumentary: No rash.  ?????Neurologic: No abnormal balance, No confusion, No numbness, No tingling, No headache.  ?????Psychiatric: No anxiety, No depression.  Physical Exam  Vitals & Measurements  T:?36.6? ?C (Oral)? HR:?72(Peripheral)? BP:?134/72?  HT:?158.00?cm? WT:?71.500?kg? BMI:?28.64?  ????General: Alert and oriented, No acute distress.  ?????Eye: Extraocular movements are intact, Normal conjunctiva.  ?????HENT: Normocephalic  ?????Neck: Supple, Non-tender, No jugular venous distention, No lymphadenopathy, No thyromegaly.  ?????Respiratory: Lungs are clear to auscultation, Respirations are non-labored, Breath sounds are equal, Symmetrical chest wall expansion, No chest wall tenderness.  ?????Cardiovascular: Normal rate, Regular rhythm, No gallop, No edema.  ?????Gastrointestinal: Soft, Non-tender, Non-distended  ?????Musculoskeletal: Normal range of motion, Normal gait.  ?????Integumentary: Warm, Dry, Intact.  ?????Neurologic: Alert, Oriented, No focal deficits.  ?????Psychiatric: Cooperative, Appropriate mood & affect  Assessment/Plan  1.?Wellness examination?Z00.00  Ordered:  Clinic Follow-Up Wellness, *Est. 12/27/21 9:00:00 CST, Order for future visit, Wellness examination, [facility]  [facility]  Medicare Annual Wellness- Subsequent  PC, Wellness examination  Osteoporosis, [facility] AMB - [facility], 12/21/20 9:39:00 CST  ?  2.?HTN - Hypertension?I10  Ordered:  Clinic Follow up, *Est. 06/23/21 9:30:00 CDT, Order for future visit, HTN - Hypertension, [facility]  ?  3.?HLD - Hyperlipidemia?E78.5  ?  4.?Adenocarcinoma, lung?C34.90  ?  5.?Emphysema/COPD?J43.9  ?  6.?Former smoker?Z87.891  ?  7.?GERD - Gastro-esophageal reflux disease?K21.9  ?  8.?Osteoporosis?M81.0  Ordered:  Medicare Annual Wellness- Subsequent  PC, Wellness examination  Osteoporosis, [facility] AMB - [facility], 12/21/20 9:39:00 CST  ?  CMP, lipid  recetn CBC, Vit d monitored with endo  had flu vaccine  specialist visits as scheduled  6month follow up, sooner as needed and pending results  Referrals  Clinic Follow up, *Est. 06/23/21 9:30:00 CDT, Order for future visit, HTN - Hypertension, [facility]  Clinic Follow-Up Wellness, *Est. 12/27/21 9:00:00 CST, Order for future visit, Wellness examination, [facility]   Problem List/Past Medical History  Ongoing  Colon polyp  COPD - Chronic obstructive pulmonary disease  Decreased radial pulse  Diverticulosis of intestine  Emphysema  Emphysema/COPD  Former smoker  Gastroparesis  GERD - Gastro-esophageal reflux disease  History of osteoporosis  HLD - Hyperlipidemia  HTN - Hypertension  Hypercholesteremia  Hypoglycemia  Hypoglycemia  Lung mass  Neuropathy  Neuropathy  Osteoporosis  Polyp colon  Restless leg  Restless legs syndrome  SVT (supraventricular tachycardia)  SVT - Supraventricular tachycardia  Vitamin D deficiency  Historical  Diverticulitis  Procedure/Surgical History  Drainage of Right Pleural Cavity with Drainage Device, Percutaneous Approach (12/03/2020)  Excision of Right Lower Lung Lobe, Percutaneous Approach, Diagnostic (12/03/2020)  Right Lung Biopsy (12/03/2020)  Colonoscopy (04/02/2014)  Right Total knee replacement (06.2013)  Left Total knee replacement (07/17/2012)  Arthroscopy of knee  (2012)  Cardiac Ablation (08.2006)  Partial Gastrectomy (1983)  Appendectomy  Bilateral extraction of cataracts  Cholecystectomy  Excision of cervical rib  Excision of meniscus of knee  Iodine-goiter prophylaxis  Release of trigger thumb   Medications  Anoro Ellipta 62.5 mcg-25 mcg/inh inhalation powder, 1 puff(s), INH, Daily, 3 refills  baclofen 5 mg oral tablet, 5 mg= 1 tab(s), Oral, TID  Caltrate 600 + D oral tablet, 1 tab(s), Oral, BID  CYANOCOBALAMIN 1,000 MCG/ML  famotidine 40 mg oral tablet, 40 mg= 1 tab(s), Oral, Once a day (at bedtime)  Forteo 600 mcg/2.4 mL subcutaneous solution, 20 mcg, Subcutaneous, Daily  glucose 4 g oral tablet, chewable, 16 gm= 4 tab(s), Chewed, Once, PRN  metoprolol succinate 50 mg oral tablet, extended release, 50 mg= 1 tab(s), Oral, Daily  Multivitamins and Minerals oral tablet  omeprazole 20 mg oral DR capsule, 20 mg= 1 cap(s), Oral, Daily  pramipexole 0.75 mg oral tablet, See Instructions  PRAVASTATIN SODIUM 40 MG TAB, 40 mg= 1 tab(s), Oral, Once a day (at bedtime)  Stiolto Respimat 60 ACT 2.5 mcg-2.5 mcg/inh inhalation aerosol, 2 puff(s), INH, q24hr, 3 refills,? ?Not taking  Allergies  Darvon?(Unknown)  Demerol?(Unknown)  Talwin?(Unknown)  aspirin?(Unknown)  clindamycin?(Unknown)  codeine?(unknown)  penicillin?(Unknown)  traMADol?(Unknown)  Social History  Abuse/Neglect  No, 12/17/2019  Alcohol  Never, 12/03/2020  Employment/School  Retired, 08/22/2018  Exercise  Exercise type: YARDWORK., 09/24/2018  Home/Environment  Lives with Alone., 09/24/2018  Spiritual/Cultural  Mandaeism, 12/03/2020  Substance Use  Never, 12/03/2020  Tobacco  Former smoker, quit more than 30 days ago, Cigarettes, N/A, 11/23/2020  Family History  Aneurysm: Mother.  Diabetes mellitus: Brother.  Hypertension: Brother.  Lung cancer: Father.  Pacemaker pulse generator, device: Brother.  Stroke: Mother.  Uterine cancer: Mother.  Immunizations  Vaccine Date Status   influenza virus vaccine, inactivated 10/19/2020  Given   influenza virus vaccine, inactivated 11/04/2019 Recorded   influenza virus vaccine, inactivated 09/24/2018 Given   tetanus-diphtheria toxoids 09/24/2018 Given   pneumococcal 13-valent conjugate vaccine 03/24/2014 Recorded   pneumococcal 23-polyvalent vaccine 10/28/2008 Recorded   Health Maintenance  Health Maintenance  ???Pending?(in the next year)  ??? ??Due?  ??? ? ? ?COPD Maintenance-Pulmonary Rehab Education due??12/21/20??and every 1??year(s)  ??? ? ? ?COPD Management-COPD Medications Prescribed due??12/21/20??and every 1??year(s)  ??? ? ? ?Zoster Vaccine due??12/21/20??Unknown Frequency  ??? ??Due In Future?  ??? ? ? ?Obesity Screening not due until??01/01/21??and every 1??year(s)  ??? ? ? ?Advance Directive not due until??01/02/21??and every 1??year(s)  ??? ? ? ?Cognitive Screening not due until??01/02/21??and every 1??year(s)  ??? ? ? ?Fall Risk Assessment not due until??01/02/21??and every 1??year(s)  ??? ? ? ?Functional Assessment not due until??01/02/21??and every 1??year(s)  ??? ? ? ?Influenza Vaccine not due until??10/01/21??and every 1??day(s)  ??? ? ? ?COPD Management-Oxygen Assessment not due until??12/05/21??and every 1??year(s)  ???Satisfied?(in the past 1 year)  ??? ??Satisfied?  ??? ? ? ?ADL Screening on??12/21/20.??Satisfied by Marielle Paige MD  ??? ? ? ?Advance Directive on??12/03/20.??Satisfied by Noemy Rhodes RN  ??? ? ? ?Blood Pressure Screening on??12/21/20.??Satisfied by Marielle Paige MD  ??? ? ? ?Body Mass Index Check on??12/21/20.??Satisfied by Deidra Valles LPN  ??? ? ? ?COPD Maintenance-Spirometry on??11/23/20.??Satisfied by Kimberly Solares  ??? ? ? ?COPD Management-Oxygen Assessment on??12/05/20.??Satisfied by Giovany Goldman LPN  ??? ? ? ?Cognitive Screening on??12/21/20.??Satisfied by Marielle Paige MD  ??? ? ? ?Depression Screening on??12/21/20.??Satisfied by Marielle Paige MD  ??? ? ? ?Diabetes Screening on??12/21/20.??Satisfied by  Alex Beach  ??? ? ? ?Fall Risk Assessment on??12/21/20.??Satisfied by Marielle Paige MD  ??? ? ? ?Functional Assessment on??12/04/20.??Satisfied by Cody MANE, Noemy FRAGA.  ??? ? ? ?Hypertension Management-BMP on??12/21/20.??Satisfied by Alex Beach  ??? ? ? ?Influenza Vaccine on??10/19/20.??Satisfied by Ronna Smyth CMA  ??? ? ? ?Lipid Screening on??12/21/20.??Satisfied by Alex Beach  ??? ? ? ?Medicare Annual Wellness Exam on??12/21/20.??Satisfied by Marielle Paige MD  ??? ? ? ?Obesity Screening on??12/21/20.??Satisfied by Deidra Valles LPN  ?

## 2022-05-03 NOTE — HISTORICAL [facility] CERN[facility]
This is a historical note converted from Juan R. Formatting and pictures may have been removed.  Please reference Juan R for original formatting and attached multimedia. Chief Complaint  C/O PRODUCTIVE COUGH, EXERTIONAL S.O.B X 3 DAYS. LOW GRADE TEMP ALL WEEKEND.  History of Present Illness  Cough and shortness of breath for 3 days with green sputum. ?No fever or chills.  Physical Exam  Vitals & Measurements  T:?37.1? ?C (Oral)? HR:?72(Peripheral)? BP:?160/72?  HT:?157?cm? WT:?73.0?kg? BMI:?29.62?  84-year-old white female. ?No acute distress. ?Respiratory rate normal. ?TMs clear. ?Pharynx clear.? Lungs no wheeze?or rales-slight rhonchi.  Chest x-ray-no infiltrate. ?Radiologist interpretation pending.  Assessment/Plan  1.?Bronchitis?J40  ?Z-Armaan. ?Celestone 2 cc IM. ?Phenergan DM.  Ordered:  betamethasone, 12 mg, IM, Once, first dose 03/11/19 10:00:00 CDT, stop date 03/11/19 10:00:00 CDT  Office/Outpatient Visit Level 2 Established 17409 PC, Bronchitis, [facility] - [facility], 03/11/19 9:47:00 CDT  ?  Orders:  XR Chest 2 Views, Routine, 03/11/19 9:02:00 CDT, Other (please specify), None, Ambulatory, Rad Type, Shortness of breath, [facility], 03/11/19 9:02:00 CDT   Problem List/Past Medical History  Ongoing  Colon polyp  Decreased radial pulse  Emphysema/COPD  Former smoker  Hypercholesteremia  Hypoglycemia  Neuropathy  Osteoporosis  Restless leg  SVT (supraventricular tachycardia)  Vitamin D deficiency  Historical  No qualifying data  Procedure/Surgical History  Right Total knee replacement (06/2013)  Left Total knee replacement (07/17/2012)  Arthroscopy of knee (2012)  Cardiac Ablation (08/2006)  Partial Gastrectomy (1983)  Appendectomy  Bilateral extraction of cataracts  Cholecystectomy  Excision of cervical rib  Excision of meniscus of knee  Iodine-goiter prophylaxis  Release of trigger thumb   Medications  AMLODIPINE BESYLATE 5 MG TAB, 5 mg= 1 tab(s), Oral, Daily  baclofen 5 mg oral  tablet  betamethasone, 12 mg, IM, Once  CYANOCOBALAMIN 1,000 MCG/ML  pramipexole 0.75 mg oral tablet, See Instructions, 3 refills  PRAVASTATIN SODIUM 40 MG TAB, 40 mg= 1 tab(s), Oral, Once a day (at bedtime)  Promethazine DM 6.25 mg-15 mg/5 mL oral syrup, 5 mL, Oral, q6hr, PRN, 1 refills  Allergies  Darvon?(Unknown)  aspirin?(Unknown)  codeine?(unknown)  penicillin?(Unknown)  Social History  Alcohol  Never, 08/22/2018  Employment/School  Retired, 08/22/2018  Exercise  Exercise type: YARDWORK., 09/24/2018  Home/Environment  Lives with Alone., 09/24/2018  Substance Abuse  Never, 08/22/2018  Tobacco  Never (less than 100 in lifetime), N/A, 03/11/2019  Never smoker, No, 12/05/2018  Never smoker, No, 11/12/2018  Never smoker Use:., 08/22/2018  Family History  Aneurysm: Mother.  Diabetes mellitus: Brother.  Hypertension: Brother.  Lung cancer: Father.  Pacemaker pulse generator, device: Brother.  Stroke: Mother.  Uterine cancer: Mother.  Immunizations  Vaccine Date Status   influenza virus vaccine, inactivated 09/24/2018 Given   tetanus-diphtheria toxoids 09/24/2018 Given   pneumococcal 13-valent conjugate vaccine 03/24/2014 Recorded   pneumococcal 23-polyvalent vaccine 10/28/2008 Recorded   Health Maintenance  Health Maintenance  ???Pending?(in the next year)  ??? ??OverDue  ??? ? ? ?Pneumococcal Vaccine due??and every?  ??? ??Due?  ??? ? ? ?ADL Screening due??03/11/19??and every 1??year(s)  ??? ? ? ?Advance Directive due??03/11/19??and every 1??year(s)  ??? ? ? ?COPD Maintenance-Pulmonary Rehab Education due??03/11/19??and every 1??year(s)  ??? ? ? ?COPD Management-COPD Medications Prescribed due??03/11/19??and every 1??year(s)  ??? ? ? ?Cognitive Screening due??03/11/19??and every 1??year(s)  ??? ? ? ?Fall Risk Assessment due??03/11/19??and every 1??year(s)  ??? ? ? ?Functional Assessment due??03/11/19??and every 1??year(s)  ??? ? ? ?Geriatric Depression Screening due??03/11/19??and every 1??year(s)  ??? ? ? ?Smoking  Cessation due??03/11/19??Variable frequency  ??? ? ? ?Zoster Vaccine due??03/11/19??and every 100??year(s)  ??? ??Due In Future?  ??? ? ? ?COPD Management-Oxygen Assessment not due until??10/10/19??and every 1??year(s)  ???Satisfied?(in the past 1 year)  ??? ??Satisfied?  ??? ? ? ?Blood Pressure Screening on??03/11/19.??Satisfied by Deidra Valles LPN  ??? ? ? ?Body Mass Index Check on??03/11/19.??Satisfied by Deidra Valles LPN  ??? ? ? ?COPD Management-Oxygen Assessment on??10/10/18.??Satisfied by Ronna Smyth CMA.  ??? ? ? ?Diabetes Screening on??12/05/18.??Satisfied by Keesha Gomes  ??? ? ? ?Influenza Vaccine on??09/24/18.??Satisfied by Deidra Valles LPN  ??? ? ? ?Lipid Screening on??12/05/18.??Satisfied by Keesha Gomes  ??? ? ? ?Obesity Screening on??03/11/19.??Satisfied by Deidra Valles LPN  ??? ? ? ?Tetanus Vaccine on??09/24/18.??Satisfied by Deidra Valles LPN  ?  ?

## 2022-05-03 NOTE — HISTORICAL [facility] CERNER
This is a historical note converted from Juan R. Formatting and pictures may have been removed.  Please reference Juan R for original formatting and attached multimedia. Chief Complaint  CPX  History of Present Illness  86 y/o female here for wellness  sees dr. lopez- she says he stopped reclast and monitors sugar.  Dr. john -once a year  Dr. key- GI  mammogram- Dr. shankar  quit smoking 2 years ago.  she rick some walking for exercise  her sons live next door.  she cooks and cleans but no longer drives  ?  ?  Dr. Escamilla hx;  HEENT -?Dr. Hatch ?eye exam s/p cataracts  ???? Dr. Whitaker prn - headaches/RLS/Neuropathy  CHEST? - COPD/former smoker  C/V - Dr. John-yrly/Coley-prn ??- decreased left radial pulse/SVT/Hyperchol  GI? - Dr Key colonoscope polyp - repeat prn  ???? EGD -  esophagitis/gastritis - yrly??????? s/p gastrectomy-ulcer  ????? 10/10/18 Diverticulitis   - nocturia  M/S - Dr. Zavala - s/p bilat tot. knees?? 2016 Fx ankle  HYPOGLYCEMIA - Dr. John Lopez  GYN - Dr. Shankar - heladio?- ??? Left ovary lesion -tests neg  OSTEOPOROSIS - Dr. John Lopez q 6? mos - Reclast  VITAMIN D DEF. -?? ??? ???????  [1] [1]  Review of Systems  ????General: Alert and oriented, No acute distress.  ?????Eye: Extraocular movements are intact, Normal conjunctiva.  ?????HENT: Normocephalic, Oral mucosa is moist.  ?????Neck: Supple, No jugular venous distention, No lymphadenopathy, No thyromegaly.  ?????Respiratory: Lungs are clear to auscultation, Respirations are non-labored, Breath sounds are equal, Symmetrical chest wall expansion, No chest wall tenderness.  ?????Cardiovascular: Normal rate, irregular rhythm, No gallop, Normal peripheral perfusion, No edema.  ?????Gastrointestinal: Soft, Non-tender, Non-distended, Normal bowel sounds.  ?????Musculoskeletal: Normal range of motion, Normal gait.  ?????Integumentary: Warm, Dry, Intact.  ?????Neurologic: Alert, Oriented, No focal  deficits.  ?????Psychiatric: Cooperative, Appropriate mood & affect  Physical Exam  Vitals & Measurements  HR:?80(Peripheral)? BP:?132/72?  HT:?157?cm? WT:?73?kg? BMI:?29.62?  ????General: Alert and oriented, No acute distress.  ?????Eye: Extraocular movements are intact, Normal conjunctiva.  ?????HENT: Normocephalic, Normal hearing, Oral mucosa is moist.  ?????Neck: Supple, Non-tender, No carotid bruit, No jugular venous distention, No lymphadenopathy, No thyromegaly.  ?????Respiratory: Lungs are clear to auscultation, Respirations are non-labored, Breath sounds are equal, Symmetrical chest wall expansion, No chest wall tenderness.  ?????Cardiovascular: Normal rate, Regular rhythm, No murmur, No gallop, Good pulses equal in all extremities, Normal peripheral perfusion, No edema.  ?????Gastrointestinal: Soft, Non-tender, Non-distended, Normal bowel sounds.  ?????Musculoskeletal: Normal range of motion, Normal gait.  ?????Integumentary: Warm, Dry, Intact.  ?????Neurologic: Alert, Oriented, No focal deficits.  ?????Psychiatric: Cooperative, Appropriate mood & affect, Normal judgment, Non-suicidal.  Assessment/Plan  1.?Wellness examination?Z00.00  ?  2.?HTN (hypertension)?I10  ?  3.?Elevated glucose?R73.09  Ordered:  Hemoglobin A1c, Routine collect, 12/17/19 10:05:00 CST, Blood, Order for future visit, Stop date 12/17/19 10:05:00 CST, Lab Collect, Elevated glucose, 12/17/19 10:05:00 CST  Microalbumin Urine, Routine collect, Urine, Order for future visit, 12/17/19 10:05:00 CST, Stop date 12/17/19 10:05:00 CST, Nurse collect, Elevated glucose  Urinalysis Complete no reflex, Routine collect, Urine, Order for future visit, 12/17/19 10:05:00 CST, Stop date 12/17/19 10:05:00 CST, Nurse collect, Elevated glucose  ?  4.?Osteoporosis?M81.0  Ordered:  Vitamin D, 25-Hydroxy Level, Routine collect, 12/17/19 10:05:00 CST, Blood, Order for future visit, Stop date 12/17/19 10:05:00 CST, Lab Collect, Osteoporosis  Hypercholesteremia   Vitamin D deficiency, 12/17/19 10:05:00 CST  ?  5.?Colon polyp?K63.5  ?  6.?Hypercholesteremia?E78.00  Ordered:  Comprehensive Metabolic Panel, Routine collect, 12/17/19 10:05:00 CST, Blood, Order for future visit, Stop date 12/17/19 10:05:00 CST, Lab Collect, Hypercholesteremia  Vitamin D deficiency, 12/17/19 10:05:00 CST  Vitamin D, 25-Hydroxy Level, Routine collect, 12/17/19 10:05:00 CST, Blood, Order for future visit, Stop date 12/17/19 10:05:00 CST, Lab Collect, Osteoporosis  Hypercholesteremia  Vitamin D deficiency, 12/17/19 10:05:00 CST  ?  7.?Restless leg?G25.81  ?  8.?Vitamin D deficiency?E55.9  Ordered:  Comprehensive Metabolic Panel, Routine collect, 12/17/19 10:05:00 CST, Blood, Order for future visit, Stop date 12/17/19 10:05:00 CST, Lab Collect, Hypercholesteremia  Vitamin D deficiency, 12/17/19 10:05:00 CST  Vitamin D, 25-Hydroxy Level, Routine collect, 12/17/19 10:05:00 CST, Blood, Order for future visit, Stop date 12/17/19 10:05:00 CST, Lab Collect, Osteoporosis  Hypercholesteremia  Vitamin D deficiency, 12/17/19 10:05:00 CST  ?  9.?Former smoker?Z87.891  ?  Orders:  Lab Collection Request, 12/17/19 10:05:00 CST, [facility] - [facility], 12/17/19 10:05:00 CST  non fasting labs today  had flu vaccine at the pharmacy  encourage low sodium, walking for exercise  reqeust outside records  annual wellness, sooner as needed and pending lab results  ?   Problem List/Past Medical History  Ongoing  Colon polyp  Decreased radial pulse  Emphysema/COPD  Former smoker  Hypercholesteremia  Hypoglycemia  Neuropathy  Osteoporosis  Restless leg  SVT (supraventricular tachycardia)  Vitamin D deficiency  Historical  No qualifying data  Procedure/Surgical History  Colonoscopy (04/02/2014)  Right Total knee replacement (06.2013)  Left Total knee replacement (07/17/2012)  Arthroscopy of knee (2012)  Cardiac Ablation (08.2006)  Partial Gastrectomy (1983)  Appendectomy  Bilateral extraction of  cataracts  Cholecystectomy  Excision of cervical rib  Excision of meniscus of knee  Iodine-goiter prophylaxis  Release of trigger thumb   Medications  AMLODIPINE BESYLATE 5 MG TAB, 5 mg= 1 tab(s), Oral, Daily  baclofen 5 mg oral tablet  CYANOCOBALAMIN 1,000 MCG/ML  omeprazole 20 mg oral DR capsule, 20 mg= 1 cap(s), Oral, Daily  pramipexole 0.75 mg oral tablet, See Instructions  PRAVASTATIN SODIUM 40 MG TAB, 40 mg= 1 tab(s), Oral, Once a day (at bedtime)  Promethazine DM 6.25 mg-15 mg/5 mL oral syrup, 5 mL, Oral, q6hr, PRN, 1 refills,? ?Not taking  Allergies  Darvon?(Unknown)  aspirin?(Unknown)  codeine?(unknown)  penicillin?(Unknown)  Social History  Abuse/Neglect  No, 12/17/2019  Alcohol  Never, 08/22/2018  Employment/School  Retired, 08/22/2018  Exercise  Exercise type: YARDWORK., 09/24/2018  Home/Environment  Lives with Alone., 09/24/2018  Substance Use  Never, 08/22/2018  Tobacco  Former smoker, quit more than 30 days ago, No, 12/17/2019  Former smoker, quit more than 30 days ago, No, 12/09/2019  Family History  Aneurysm: Mother.  Diabetes mellitus: Brother.  Hypertension: Brother.  Lung cancer: Father.  Pacemaker pulse generator, device: Brother.  Stroke: Mother.  Uterine cancer: Mother.  Immunizations  Vaccine Date Status   influenza virus vaccine, inactivated 11/04/2019 Recorded   influenza virus vaccine, inactivated 09/24/2018 Given   tetanus-diphtheria toxoids 09/24/2018 Given   pneumococcal 13-valent conjugate vaccine 03/24/2014 Recorded   pneumococcal 23-polyvalent vaccine 10/28/2008 Recorded   Health Maintenance  Health Maintenance  ???Pending?(in the next year)  ??? ??OverDue  ??? ? ? ?Pneumococcal Vaccine due??and every?  ??? ? ? ?Geriatric Depression Screening due??01/01/19??and every 1??year(s)  ??? ? ? ?COPD Management-Oxygen Assessment due??10/10/19??and every 1??year(s)  ??? ? ? ?Hypertension Management-BMP due??12/05/19??and every 1??year(s)  ??? ??Due?  ??? ? ? ?COPD Maintenance-Pulmonary Rehab  Education due??12/17/19??and every 1??year(s)  ??? ? ? ?COPD Management-COPD Medications Prescribed due??12/17/19??and every 1??year(s)  ??? ? ? ?Zoster Vaccine due??12/17/19??and every 100??year(s)  ??? ??Due In Future?  ??? ? ? ?Advance Directive not due until??01/01/20??and every 1??year(s)  ??? ? ? ?Cognitive Screening not due until??01/01/20??and every 1??year(s)  ??? ? ? ?Fall Risk Assessment not due until??01/01/20??and every 1??year(s)  ??? ? ? ?Functional Assessment not due until??01/01/20??and every 1??year(s)  ??? ? ? ?Obesity Screening not due until??01/01/20??and every 1??year(s)  ??? ? ? ?Hypertension Management-Blood Pressure not due until??12/16/20??and every 1??year(s)  ???Satisfied?(in the past 1 year)  ??? ??Satisfied?  ??? ? ? ?ADL Screening on??12/17/19.??Satisfied by Marielle Paige MD  ??? ? ? ?Advance Directive on??12/17/19.??Satisfied by Marielle Paige MD  ??? ? ? ?Blood Pressure Screening on??12/17/19.??Satisfied by Deidra Valles LPN  ??? ? ? ?Body Mass Index Check on??12/17/19.??Satisfied by Deidra Valles LPN  ??? ? ? ?Cognitive Screening on??12/17/19.??Satisfied by Marielle Paige MD  ??? ? ? ?Fall Risk Assessment on??12/17/19.??Satisfied by Marielle Paige MD  ??? ? ? ?Functional Assessment on??12/17/19.??Satisfied by Marielle Paige MD  ??? ? ? ?Influenza Vaccine on??11/04/19.??Satisfied by Deidra Valles LPN  ??? ? ? ?Obesity Screening on??12/17/19.??Satisfied by Deidra Valles LPN  ?

## 2022-05-03 NOTE — HISTORICAL [facility]
This is a historical note converted from Juan R. Formatting and pictures may have been removed.  Please reference Juan R for original formatting and attached multimedia. Chief Complaint  Lower abdomenn pain  History of Present Illness  Complains of abdominal pain for 2 days. ?No fever no chills.? Poor appetite.? Bowel movements are normal.? No melena or hematochezia.? Urine slight increase in frequency but no dysuria.? Some lower back pain which?is chronic.? History of diverticulosis.? Last colonoscope was 10 years ago?and was told to repeat as needed.? In office with her daughter.  Review of Systems  HEENT -?Dr. Hatch ?eye exam s/p cataracts  ???? Dr. Whitaker prn - headaches/RLS/Neuropathy  CHEST? - COPD/former smoker  C/V - Dr. Chamberlain-heladio/Vianca-prn ??- decreased left radial pulse/SVT/Hyperchol  GI? - Dr Diaz colonoscope polyp - repeat prn  ???? EGD -  esophagitis/gastritis - yrly??????? s/p gastrectomy-ulcer   - nocturia  M/S - Dr. Zavala - s/p bilat tot. knees?? 2016 Fx ankle  HYPOGLYCEMIA - Dr. John Lopez  GYN - Dr. Shankar - heladio?- ??? Left ovary lesion -tests neg  OSTEOPOROSIS - Dr. John Lopez q 6? mos - Reclast  VITAMIN D DEF. -?? ??? ???????  [1]  Physical Exam  Vitals & Measurements  HR:?88(Peripheral)? BP:?126/72? SpO2:?97%?  HT:?157.48?cm? HT:?157.48?cm? WT:?74.0?kg? WT:?74.0?kg? BMI:?29.84?  84-year-old white female.? Does not appear to be in acute distress.  She has some discomfort in her lower back?getting on and off exam table and?sitting up.  Abdomen-flat.? Bowel sounds positive.? Soft.? No guarding or rebound.? Tender?left?lower quadrant?sigmoid area.? Suprapubic and right lower quadrant nontender.? No masses palpable.  CBC-WBC-14,200-elevated  Arfhyfwclm-1-3?WBCs and?4-5 RBCs.  Assessment/Plan  1.?LLQ abdominal pain  ?Have ordered a CT scan of the abdomen and pelvis.? Believe she has diverticulitis and will?treat? with antibiotics?after CT report.? Also have?done a urine  culture.  Ordered:  Lab Collection Request, 10/10/18 11:44:00 CDT, [facility] - [facility], 10/10/18 11:44:00 CDT  Office/Outpatient Visit Level 4 Established 81265 PC, LLQ abdominal pain  Diverticulitis, [facility] - [facility], 10/10/18 11:43:00 CDT  Schedule Diagnostics Study, CT scan abdomen & Pelvis with & without, If needed/indicated Oral Contrast Requested, [facility], 10/10/18 11:41:00 CDT, LLQ abdominal pain  Diverticulitis  Urine Culture [id number], Routine collect, 10/10/18 11:44:00 CDT, Order for future visit, Urine, Stop date 10/10/18 11:44:00 CDT, LLQ abdominal pain  Diverticulitis  ?  2.?Diverticulitis  Ordered:  Lab Collection Request, 10/10/18 11:44:00 CDT, [facility] - [facility], 10/10/18 11:44:00 CDT  Office/Outpatient Visit Level 4 Established 86816 PC, LLQ abdominal pain  Diverticulitis, [facility] - [facility], 10/10/18 11:43:00 CDT  Schedule Diagnostics Study, CT scan abdomen & Pelvis with & without, If needed/indicated Oral Contrast Requested, [facility], 10/10/18 11:41:00 CDT, LLQ abdominal pain  Diverticulitis  Urine Culture [id number], Routine collect, 10/10/18 11:44:00 CDT, Order for future visit, Urine, Stop date 10/10/18 11:44:00 CDT, LLQ abdominal pain  Diverticulitis  ?   Problem List/Past Medical History  Ongoing  Colon polyp  Decreased radial pulse  Emphysema/COPD  Former smoker  Hypercholesteremia  Hypoglycemia  Neuropathy  Osteoporosis  Restless leg  SVT (supraventricular tachycardia)  Vitamin D deficiency  Historical  No qualifying data  Procedure/Surgical History  Right Total knee replacement (06/2013)  Left Total knee replacement (07/17/2012)  Arthroscopy of knee (2012)  Cardiac Ablation (08/2006)  Partial Gastrectomy (1983)  Appendectomy  Bilateral extraction of cataracts  Cholecystectomy  Excision of cervical rib  Excision of meniscus of knee  Iodine-goiter prophylaxis  Release of trigger thumb   Medications  AMLODIPINE BESYLATE 5 MG TAB  CYANOCOBALAMIN 1,000 MCG/ML  DIAZepam 5 mg oral tablet, See Instructions, 1  refills  METOPROLOL SUCC ER 50 MG TAB  OMEPRAZOLE DR 20 MG CAPSULE  PRAMIPEXOLE 0.75 MG TABLET  PRAVASTATIN SODIUM 40 MG TAB  triamcinolone 0.1% topical cream, 1 adolph, TOP, BID, 1 refills  Allergies  Darvon?(Unknown)  aspirin?(Unknown)  codeine?(unknown)  penicillin?(Unknown)  Social History  Alcohol  Never, 08/22/2018  Employment/School  Retired, 08/22/2018  Exercise  Exercise type: YARDWORK., 09/24/2018  Home/Environment  Lives with Alone., 09/24/2018  Substance Abuse  Never, 08/22/2018  Tobacco  Never smoker Use:., 08/22/2018  Family History  Aneurysm: Mother.  Diabetes mellitus: Brother.  Hypertension: Brother.  Lung cancer: Father.  Pacemaker pulse generator, device: Brother.  Stroke: Mother.  Uterine cancer: Mother.  Immunizations  Vaccine Date Status   influenza virus vaccine, inactivated 09/24/2018 Given   tetanus-diphtheria toxoids 09/24/2018 Given   pneumococcal 13-valent conjugate vaccine 03/24/2014 Recorded   pneumococcal 23-polyvalent vaccine 10/28/2008 Recorded   Health Maintenance  Health Maintenance  ???Pending?(in the next year)  ??? ??OverDue  ??? ? ? ?Pneumococcal Vaccine due??and every?  ??? ??Due?  ??? ? ? ?ADL Screening due??10/10/18??and every 1??year(s)  ??? ? ? ?Advance Directive due??10/10/18??and every 1??year(s)  ??? ? ? ?COPD Maintenance-Pulmonary Rehab Education due??10/10/18??and every 1??year(s)  ??? ? ? ?COPD Management-COPD Medications Prescribed due??10/10/18??and every 1??year(s)  ??? ? ? ?Cognitive Screening due??10/10/18??and every 1??year(s)  ??? ? ? ?Fall Risk Assessment due??10/10/18??and every 1??year(s)  ??? ? ? ?Functional Assessment due??10/10/18??and every 1??year(s)  ??? ? ? ?Geriatric Depression Screening due??10/10/18??and every 1??year(s)  ??? ? ? ?Smoking Cessation due??10/10/18??Variable frequency  ??? ? ? ?Zoster Vaccine due??10/10/18??and every 100??year(s)  ???Satisfied?(in the past 1 year)  ??? ??Satisfied?  ??? ? ? ?Blood Pressure Screening  on??10/10/18.??Satisfied by Ronna Smyth  ??? ? ? ?Body Mass Index Check on??10/10/18.??Satisfied by Ronna Smyth  ??? ? ? ?COPD Management-Oxygen Assessment on??10/10/18.??Satisfied by Ronna Smyth  ??? ? ? ?Influenza Vaccine on??09/24/18.??Satisfied by Deidra Valles  ??? ? ? ?Obesity Screening on??10/10/18.??Satisfied by Ronna Smyth  ??? ? ? ?Tetanus Vaccine on??09/24/18.??Satisfied by Deidra Valles  ?  ?     [1]?Office Visit Note - [facility]; Andi SHAH, Jin HUANG 10/01/2018 11:57 CDT   Over the 10th 2018:?Urine culture-no growth.

## 2022-05-03 NOTE — HISTORICAL [facility]
This is a historical note converted from Juan R. Formatting and pictures may have been removed.  Please reference Juan R for original formatting and attached multimedia. Chief Complaint  Neck pain and spasms- left side of neck  History of Present Illness  ?  84 y/o female here for left neck pain that has been chronic and intermittent  says the pain is wore at night.  did see Dr. sloan in the past for headaches. said she would get injections in her her neck for the headaches.  denies curretn headaches.  says it fells like a muscle spasm in her left neck  daily spasm for over a year, progressively worsening  no previous injury  takes tylenol and topical rubs  noted on chart review she is precribed baclofen, takes nightly  ?   dr. collins ENT  Dr. Key- GI  Dr. james- cardiology  Dr. Lopez- endo  ?  ?  ?  ?     female here for wellness  sees dr. lopez- she says he stopped reclast and monitors sugar.  Dr. john -once a year  Dr. key- GI  mammogram- Dr. shankar  quit smoking 2 years ago.  she rick some walking for exercise  her sons live next door.  she cooks and cleans but no longer drives  ?  ?   Dr. Escamilla hx;  HEENT -?Dr. Hatch ?eye exam s/p cataracts  ???? Dr. Sloan prn - headaches/RLS/Neuropathy  CHEST? - COPD/former smoker  C/V - Dr. John-heladio/Coley-prn ??- decreased left radial pulse/SVT/Hyperchol  GI? - Dr Key colonoscope polyp - repeat prn  ???? EGD -  esophagitis/gastritis - yrly??????? s/p gastrectomy-ulcer  ????? 10/10/18 Diverticulitis   - nocturia  M/S - Dr. Zavala - s/p bilat tot. knees?? 2016 Fx ankle  HYPOGLYCEMIA - Dr. John Lopez  GYN - Dr. Shankar - heladio?- ??? Left ovary lesion -tests neg  OSTEOPOROSIS - Dr. John Lopez q 6? mos - Reclast  VITAMIN D DEF. -?? ??? ???????  [1] [1]  Review of Systems  Constitutional: No fever, No chills, No sweats, No weakness, No fatigue, No decreased activity, no weight changes  ?????Eye: No blurring, No double  vision.  ?????Ear/Nose/Mouth/Throat: No nasal congestion, No sore throat.  ?????Respiratory: No shortness of breath, No cough, No wheezing, No cyanosis.  ?????Cardiovascular: No chest pain, No palpitations, No bradycardia, No tachycardia, No peripheral edema.  ?????Gastrointestinal: No nausea, No vomiting, No diarrhea, No constipation, No abdominal pain.  ?????Genitourinary: No dysuria, No hematuria.  ?????Musculoskeletal: neck?pain  ?????Integumentary: No rash.  ?????Neurologic: No abnormal balance, No confusion, No numbness, No tingling, No headache.  ?????Psychiatric: No anxiety, No depression.  Physical Exam  Vitals & Measurements  T:?37.1? ?C (Oral)? HR:?72(Peripheral)? BP:?132/84?  HT:?157.00?cm? HT:?157?cm? WT:?75.500?kg? WT:?75.5?kg? BMI:?30.63?  ????General: Alert and oriented, No acute distress.  ?????Eye: Extraocular movements are intact, Normal conjunctiva.  ?????HENT: Normocephalic  ???? Respiratory: Lungs are clear to auscultation, Respirations are non-labored, Breath sounds are equal, Symmetrical chest wall expansion, No chest wall tenderness.  ?????Cardiovascular: Normal rate, Regular rhythm  ?????Gastrointestinal: Soft, Non-tender, Non-distended, Normal bowel sounds.  ?????Musculoskeletal:?muscle spasm left neck, cervical vertebra non tender on palpation  ?????Integumentary: Warm, Dry, Intact.  ?????Neurologic: Alert, Oriented, No focal deficits.  ?????Psychiatric: Cooperative, Appropriate mood & affect, Normal judgment  Assessment/Plan  1.?Cervical pain (neck)?M54.2  2.?Muscle spasms of neck?M62.838  3.?BMI 30.0-30.9,adult?Z68.30  4.?Osteoporosis?M81.0  5.?Former smoker?Z87.891  continue baclofen q pm. heat therapy, ROM  declined PT at this time.  scheduled for wellness within 3months, sooner follow up as needed   Problem List/Past Medical History  Ongoing  Colon polyp  Decreased radial pulse  Emphysema/COPD  Former smoker  Hypercholesteremia  Hypoglycemia  Neuropathy  Osteoporosis  Restless  leg  SVT (supraventricular tachycardia)  Vitamin D deficiency  Historical  No qualifying data  Procedure/Surgical History  Colonoscopy (04/02/2014)  Right Total knee replacement (06.2013)  Left Total knee replacement (07/17/2012)  Arthroscopy of knee (2012)  Cardiac Ablation (08.2006)  Partial Gastrectomy (1983)  Appendectomy  Bilateral extraction of cataracts  Cholecystectomy  Excision of cervical rib  Excision of meniscus of knee  Iodine-goiter prophylaxis  Release of trigger thumb   Medications  AMLODIPINE BESYLATE 5 MG TAB, 5 mg= 1 tab(s), Oral, Daily  baclofen 5 mg oral tablet  Caltrate 600 + D oral tablet, 1 tab(s), Oral, BID  CYANOCOBALAMIN 1,000 MCG/ML  D3 1000 (25 mcg) oral tablet, 1000 IntUnit= 1 tab(s), Oral, Daily  famotidine 40 mg oral tablet, 40 mg= 1 tab(s), Oral, Once a day (at bedtime)  glucose 4 g oral tablet, chewable, 16 gm= 4 tab(s), Chewed, Once, PRN  metoprolol succinate 50 mg oral tablet, extended release, 50 mg= 1 tab(s), Oral, Daily  Multivitamins and Minerals oral tablet  omeprazole 20 mg oral DR capsule, 20 mg= 1 cap(s), Oral, Daily  pramipexole 0.75 mg oral tablet, See Instructions  PRAVASTATIN SODIUM 40 MG TAB, 40 mg= 1 tab(s), Oral, Once a day (at bedtime)  Promethazine DM 6.25 mg-15 mg/5 mL oral syrup, 5 mL, Oral, q6hr, PRN, 1 refills,? ?Not taking  Allergies  Darvon?(Unknown)  aspirin?(Unknown)  codeine?(unknown)  penicillin?(Unknown)  Social History  Abuse/Neglect  No, 09/23/2020  No, 12/17/2019  Alcohol  Never, 08/22/2018  Employment/School  Retired, 08/22/2018  Exercise  Exercise type: YARDWORK., 09/24/2018  Home/Environment  Lives with Alone., 09/24/2018  Substance Use  Never, 08/22/2018  Tobacco  Former smoker, quit more than 30 days ago, No, 09/23/2020  Former smoker, quit more than 30 days ago, No, 12/17/2019  Former smoker, quit more than 30 days ago, No, 12/09/2019  Family History  Aneurysm: Mother.  Diabetes mellitus: Brother.  Hypertension: Brother.  Lung cancer:  Father.  Pacemaker pulse generator, device: Brother.  Stroke: Mother.  Uterine cancer: Mother.  Immunizations  Vaccine Date Status   influenza virus vaccine, inactivated 11/04/2019 Recorded   influenza virus vaccine, inactivated 09/24/2018 Given   tetanus-diphtheria toxoids 09/24/2018 Given   pneumococcal 13-valent conjugate vaccine 03/24/2014 Recorded   pneumococcal 23-polyvalent vaccine 10/28/2008 Recorded   Health Maintenance  Health Maintenance  ???Pending?(in the next year)  ??? ??OverDue  ??? ? ? ?Influenza Vaccine due??and every?  ??? ? ? ?Pneumococcal Vaccine due??and every?  ??? ? ? ?COPD Management-Oxygen Assessment due??10/10/19??and every 1??year(s)  ??? ? ? ?Advance Directive due??01/02/20??and every 1??year(s)  ??? ? ? ?Cognitive Screening due??01/02/20??and every 1??year(s)  ??? ? ? ?Fall Risk Assessment due??01/02/20??and every 1??year(s)  ??? ? ? ?Functional Assessment due??01/02/20??and every 1??year(s)  ??? ??Due?  ??? ? ? ?COPD Maintenance-Pulmonary Rehab Education due??09/23/20??and every 1??year(s)  ??? ? ? ?COPD Maintenance-Spirometry due??09/23/20??and every?  ??? ? ? ?COPD Management-COPD Medications Prescribed due??09/23/20??and every 1??year(s)  ??? ? ? ?Depression Screening due??09/23/20??and every?  ??? ? ? ?Zoster Vaccine due??09/23/20??and every?  ??? ??Near Due?  ??? ? ? ?Medicare Annual Wellness Exam near due??12/17/20??and every 1??year(s)  ??? ??Due In Future?  ??? ? ? ?Diabetes Screening not due until??12/16/20??and every 1??year(s)  ??? ? ? ?Hypertension Management-BMP not due until??12/16/20??and every 1??year(s)  ??? ? ? ?ADL Screening not due until??12/17/20??and every 1??year(s)  ??? ? ? ?Obesity Screening not due until??01/01/21??and every 1??year(s)  ???Satisfied?(in the past 1 year)  ??? ??Satisfied?  ??? ? ? ?ADL Screening on??12/17/19.??Satisfied by Marielle Paige MD  ??? ? ? ?Advance Directive on??12/17/19.??Satisfied by Marielle Paige MD  ??? ? ? ?Blood Pressure  Screening on??09/23/20.??Satisfied by Marielle Paige MD  ??? ? ? ?Body Mass Index Check on??09/23/20.??Satisfied by Ronna Smyth CMA  ??? ? ? ?Cognitive Screening on??12/17/19.??Satisfied by Marielle Paige MD  ??? ? ? ?Diabetes Screening on??12/17/19.??Satisfied by Alex Beach  ??? ? ? ?Fall Risk Assessment on??12/17/19.??Satisfied by Marielle Paige MD  ??? ? ? ?Functional Assessment on??12/17/19.??Satisfied by Marielle Paige MD  ??? ? ? ?Hypertension Management-Blood Pressure on??09/23/20.??Satisfied by Marielle Paige MD  ??? ? ? ?Influenza Vaccine on??09/23/20.??Satisfied by Ronna Smyth CMA  ??? ? ? ?Medicare Annual Wellness Exam on??12/17/19.??Satisfied by Marielle Paige MD  ??? ? ? ?Obesity Screening on??09/23/20.??Satisfied by Ronna Smyth CMA  ?  Diagnostic Results  (09/23/2020 10:02 CDT XR Spine Cervical 2 or 3 Views)  CERVICAL SPINE, 3 VIEWS:  ?  AP, lateral and open mouth odontoid views were obtained.  ?  INDICATIONS: Pain  ?  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  ?  Positive for mild to moderate degenerative disc disease at C4-5, C5-6 and  C6-7. ?Disc space narrowing with endplate sclerosis and minor spurring.  ?  Positive for straightening of the cervical spine indicating muscular spasm.  ?  Negative for fracture or lytic lesion.  ?  Negative for subluxation.  ?  Normal paraspinal soft tissues.  ?  Impression:  ?  1. ?Straightening of the cervical spine.  ?  2. ?Moderate DDD at C4-5, C5-6 and C6-7. [1]     [1]?XR Spine Cervical 2 or 3 Views; Milo Denis MD 09/23/2020 10:02 CDT

## 2022-05-03 NOTE — HISTORICAL [facility]
This is a historical note converted from Juan R. Formatting and pictures may have been removed.  Please reference Juan R for original formatting and attached multimedia. Admit and Discharge Dates  Admit Date: 12/21/2021  Discharge Date: 01/12/2022  Physicians  Attending Physician - Chloe SHAH, Carlton ALEJANDRO  Admitting Physician - Chloe SHAH, Carlton ALEJANDRO  Consulting Physician - Joselo SHAH, Abel ALEJANDRO  Consulting Physician - Eden SHAH, Damaso Sawyer MS,DO [place]  Primary Care Physician - Buffy SHAH, Allie M  Discharge Diagnosis  1.?Debility?R53.81  ?cont PT with home health  2.?Pulmonary embolism?I26.99  ?cont eliquis  3.?Left rotator cuff tear?M75.102  ?s/o cortisone injection with improvement of pain  4.?Community acquired pneumonia?J18.9  ?completed antibiotics  5.?Acute hypoxemic respiratory failure?J96.01  ?Resolved, no O2 needs  6.?Paroxysmal atrial fibrillation?I48.0  ?metoprolol decreased to 12.5 bid, digoxin changed to 0.125 every other day  7.?Hypertension?I10  ?cont metoprolol 12.5 bid  8.?Hyperlipidemia?E78.5  ?pravastatin  9.?Adenocarcinoma of lung?C34.90  ?no acute issues  10.?COPD - Chronic obstructive pulmonary disease?J44.9,?COPD without exacerbation?J44.9  -Continue anoro ellipta.  Surgical Procedures  No procedures recorded for this visit.  Immunizations  No immunizations recorded for this visit.  Admission Information  Ms. Ramirez is a 86 y/o WF with a h/o HTN, paroxysmal atrial fibrillation, COPD, and?hyperlipidemia who was admitted to [facility] on 12/21/21 for rehab. She was?initially hospitalized at [facility] from 12/14/21 until 12/21/21 with an acute PE, new onset atrial fibrillation, CAP, and acute hypoxemic respiratory failure.  ?  ?  Hospital Course  Pt doing well without O@, pt does not requirs O2 with activity. she reports intermittent shortness of breath. She was found to have a left rotator cuff tear on MRI of the left shoulder on 12/29/21 and she?received a cortisone injection on  1/5/22 by orthopedics.?Shoulder pain much improved. She has had episodes of bradycardia with heart rates in the 50s and her digoxin?and metoprolol tartrate have been held since the weekend. Pts metoprolol decreased to 12.5 bid and digoxin changed to every other day at MI. She denies any chest pain, palpitations, weakness, or fatigue. Pt anxiouys to [place] home with home health.  Time Spent on discharge  35 min  Objective  Vitals & Measurements  T:?36.6? ?C (Oral)? TMIN:?36.5? ?C (Oral)? TMAX:?36.9? ?C (Oral)? HR:?58(Peripheral)? RR:?16? BP:?130/63? SpO2:?95%?  Physical Exam  General:?frail and elderly female?, in no acute distress  Eye: PERRLA, EOMI, clear conjunctiva, eyelids normal  HENT: normocephalic, atraumatic,?no maxillary/frontal sinus tenderness to palpation  Neck: full range of motion, no thyromegaly or lymphadenopathy  Respiratory:?clear to auscultation bilaterally  Cardiovascular:?regular rate and rhythm without murmurs, gallops or rubs  Gastrointestinal:?soft, non-tender, non-distended with normal bowel sounds, without masses to palpation  Genitourinary: no CVA tenderness to palpation  Musculoskeletal:?full range of motion of all extremities/spine without limitation or discomfort  Integumentary: no rashes or skin lesions present  Neurologic: cranial nerves intact, no signs of peripheral neurological deficit, motor/sensory function intact  ?  Patient Discharge Condition  stable  Discharge Disposition  Home with home health  ?  ?  ?  Service was provided using HIPPA complaint web platform using SOC for audio/visual equipment  Patient Location: [place]  Provider Locatfion:home  Participants on call:bedside RN, patient  Physician on call: Dr. Petit  ?  ?   I personally reviewed all xray and lab findings.   Discharge Medication Reconciliation  Prescribed  bisacodyl (Dulcolax Laxative 5 mg ORAL enteric coated tablet)?10 mg, Oral, Daily, PRN constipation  calcium-vitamin D (Os-Sushant 500 + D)?1 tab(s), Oral,  BID  digoxin (digoxin 125 mcg (0.125 mg) oral tablet)?0.125 mg, Oral, Every other day  metoprolol (metoprolol tartrate 25 mg oral tab)?12.5 mg, Oral, BID  Continue  acetaminophen (Tylenol 325 mg oral tablet)?325 mg, Oral, q4hr, PRN fever  albuterol-ipratropium (DuoNeb)?3 mL, NEB, q4hr, PRN shortness of breath or wheezing  apixaban (Eliquis 5 mg oral tablet)?5 mg, Oral, BID  baclofen (baclofen 5 mg oral tablet)?5 mg, Oral, TID  benzonatate (Tessalon Perles 100 mg oral capsule)?200 mg, Oral, q8hr, PRN cough  cyanocobalamin (CYANOCOBALAMIN 1,000 MCG/ML)  docusate (Colace 100 mg oral capsule)?100 mg, Oral, BID, PRN constipation  furosemide (furosemide 20 mg oral tablet)?20 mg, Oral, Daily  gabapentin (gabapentin 100 mg oral capsule)?200 mg, Oral, Daily  insulin lispro (insulin lispro 100 units/mL injectable solution)?2-14 units, Subcutaneous, As Directed, PRN blood glucose  olodaterol-tiotropium (Stiolto Respimat 60 ACT 2.5 mcg-2.5 mcg/inh inhalation aerosol)?2 puff(s), INH, q24hr  olodaterol-tiotropium (Stiolto Respimat 60 ACT 2.5 mcg-2.5 mcg/inh inhalation aerosol)?2 puff(s), INH, q24hr  pantoprazole (Pantoprazole 40 mg ORAL EC-Tablet)?40 mg, Oral, Daily  polyethylene glycol 3350 (MiraLax (polyethylene glycol 3350))?17 gm, Oral, Daily  pramipexole (pramipexole 0.75 mg oral tablet)?0.75 mg, Oral, qPM  pravastatin (PRAVASTATIN SODIUM 40 MG TAB)?40 mg, Oral, Once a day (at bedtime)  temazepam (Restoril 15 mg oral capsule)?15 mg, Oral, At Bedtime, PRN insomnia  Discontinue  calcium-vitamin D (Caltrate 600 + D oral tablet)?1 tab(s), Oral, BID  calcium-vitamin D (Os-Sushant 500 + D)?1 tab(s), Oral, BID  digoxin (digoxin 125 mcg (0.125 mg) oral tablet)?0.125 mg, Oral, Daily  metoprolol (metoprolol tartrate 25 mg oral tab)?25 mg, Oral, BID  multivitamin with minerals (Multivitamins and Minerals oral tablet)  omeprazole (omeprazole 20 mg oral DR capsule)?20 mg, Oral, Daily  umeclidinium-vilanterol (Anoro Ellipta 62.5 mcg-25  mcg/inh inhalation powder)?1 inh, INH, Daily  Education and Orders Provided  Pulmonary Embolism  Atrial Fibrillation  Community-Acquired Pneumonia, Adult  Acute Respiratory Distress Syndrome, Adult  Deconditioning  Discharge - 01/12/22 10:24:00 CST, Home, [place] home with home health. Give all scheduled vaccinations prior to discharge.?  Discharge Activity - Activity as Tolerated, acticity per PT recommendations?  Discharge Diet - Regular, Regular diet with boost plus supplements bid?  Discharge Blood Glucose Monitoring - 01/12/22 10:24:00 CST, 3-4x/Day, Accu check qac?  Follow up  Danni Chamberlain, on 01/26/2022  ????Spoke to Devante Baer, on 01/20/2022  ????Spoke to [facility] will follow patient on discharge [phone]  [facility] will deliver DME to hospital before discharge PHONE [phone] WC and BSC  [employer]  No Qualifying Data

## 2022-05-03 NOTE — HISTORICAL [facility] CERN[facility]
This is a historical note converted from Juan R. Formatting and pictures may have been removed.  Please reference Juan R for original formatting and attached multimedia. Chief Complaint  C/O SWELLING TO RT ANKLE X 3WKS. HAD TRIPPED OVER A CONCRETE CURB PRIOR.  History of Present Illness  3 weeks ago was walking parking lot and tripped over cement barrier and fell forward.? Inverted right ankle and abrasions on left knee.? Had bruises below right lateral ankle.  Slowly better.? Here with dtr.? Also complains of insomnia - chronic.? Wakes up in middle night and unable? to resume sleep. RX diazepam with out relief.  Review of Systems  HEENT -?Dr. Hatch ?eye exam s/p cataracts  ???? Dr. Whitaker prn - headaches/RLS/Neuropathy  CHEST? - COPD/former smoker  C/V - Dr. Chamberlain-heladio/Vianca-prn ??- decreased left radial pulse/SVT/Hyperchol  GI? - Dr Diaz colonoscope polyp - repeat prn  ???? EGD -  esophagitis/gastritis - yrly??????? s/p gastrectomy-ulcer  ????? 10/10/18 Diverticulitis   - nocturia  M/S - Dr. Zavala - s/p bilat tot. knees?? 2016 Fx ankle  HYPOGLYCEMIA - Dr. John Lopez  GYN - Dr. Shankar - heladio?- ??? Left ovary lesion -tests neg  OSTEOPOROSIS - Dr. John Lopez q 6? mos - Reclast  VITAMIN D DEF. -?? ??? ???????  [1] [1]  Physical Exam  Vitals & Measurements  BP:?130/70?  HT:?157.48?cm? HT:?157.48?cm? WT:?74?kg? WT:?74?kg? BMI:?29.84?  83 y/o WF NAD ambulates without assistance  Right ankle - swelling lat maleolus.? Tender ant talo/fib lig.? Decreased ROM.  Good pulse/sensation.  X-ray - no fracture seen.? Radiologist interpretation pending.  Assessment/Plan  1.?Right ankle sprain?S93.401A  ?Soft elastic splint ordered.? Advise PT - will consider.? NSAIDS upset stomach  Ordered:  Office/Outpatient Visit Level 3 Established [id number] PC, Right ankle sprain  Insomnia, [facility] AMB - [facility], 18 14:27:00 CST  ?  2.?Insomnia?G47.00  ?Discussed Ambien if needed.? Wants to just try magnesium  supplement  Ordered:  Office/Outpatient Visit Level 3 Established [id number] PC, Right ankle sprain  Insomnia, [facility] AMB - [facility], 11/12/18 14:27:00 CST  ?   Problem List/Past Medical History  Ongoing  Colon polyp  Decreased radial pulse  Emphysema/COPD  Former smoker  Hypercholesteremia  Hypoglycemia  Neuropathy  Osteoporosis  Restless leg  SVT (supraventricular tachycardia)  Vitamin D deficiency  Historical  No qualifying data  Procedure/Surgical History  Right Total knee replacement (06/2013)  Left Total knee replacement (07/17/2012)  Arthroscopy of knee (2012)  Cardiac Ablation (08/2006)  Partial Gastrectomy (1983)  Appendectomy  Bilateral extraction of cataracts  Cholecystectomy  Excision of cervical rib  Excision of meniscus of knee  Iodine-goiter prophylaxis  Release of trigger thumb   Medications  AMLODIPINE BESYLATE 5 MG TAB  CYANOCOBALAMIN 1,000 MCG/ML  DIAZepam 5 mg oral tablet, See Instructions, 1 refills  METOPROLOL SUCC ER 50 MG TAB  OMEPRAZOLE DR 20 MG CAPSULE  PRAMIPEXOLE 0.75 MG TABLET  PRAMIPEXOLE DIHYDROCHLORIDE 0.75 MG Tablet, See Instructions  PRAVASTATIN SODIUM 40 MG TAB  triamcinolone 0.1% topical cream, 1 adolph, TOP, BID, 1 refills  Allergies  Darvon?(Unknown)  aspirin?(Unknown)  codeine?(unknown)  penicillin?(Unknown)  Social History  Alcohol  Never, 08/22/2018  Employment/School  Retired, 08/22/2018  Exercise  Exercise type: YARDWORK., 09/24/2018  Home/Environment  Lives with Alone., 09/24/2018  Substance Abuse  Never, 08/22/2018  Tobacco  Never smoker, No, 11/12/2018  Never smoker Use:., 08/22/2018  Family History  Aneurysm: Mother.  Diabetes mellitus: Brother.  Hypertension: Brother.  Lung cancer: Father.  Pacemaker pulse generator, device: Brother.  Stroke: Mother.  Uterine cancer: Mother.  Immunizations  Vaccine Date Status   influenza virus vaccine, inactivated 09/24/2018 Given   tetanus-diphtheria toxoids 09/24/2018 Given   pneumococcal 13-valent conjugate vaccine 03/24/2014 Recorded    pneumococcal 23-polyvalent vaccine 10/28/2008 Recorded   Health Maintenance  Health Maintenance  ???Pending?(in the next year)  ??? ??OverDue  ??? ? ? ?Pneumococcal Vaccine due??and every?  ??? ??Due?  ??? ? ? ?ADL Screening due??11/12/18??and every 1??year(s)  ??? ? ? ?Advance Directive due??11/12/18??and every 1??year(s)  ??? ? ? ?COPD Maintenance-Pulmonary Rehab Education due??11/12/18??and every 1??year(s)  ??? ? ? ?COPD Management-COPD Medications Prescribed due??11/12/18??and every 1??year(s)  ??? ? ? ?Cognitive Screening due??11/12/18??and every 1??year(s)  ??? ? ? ?Fall Risk Assessment due??11/12/18??and every 1??year(s)  ??? ? ? ?Functional Assessment due??11/12/18??and every 1??year(s)  ??? ? ? ?Geriatric Depression Screening due??11/12/18??and every 1??year(s)  ??? ? ? ?Smoking Cessation due??11/12/18??Variable frequency  ??? ? ? ?Zoster Vaccine due??11/12/18??and every 100??year(s)  ??? ??Due In Future?  ??? ? ? ?COPD Management-Oxygen Assessment not due until??10/10/19??and every 1??year(s)  ???Satisfied?(in the past 1 year)  ??? ??Satisfied?  ??? ? ? ?Blood Pressure Screening on??11/12/18.??Satisfied by Deidra Valles  ??? ? ? ?Body Mass Index Check on??11/12/18.??Satisfied by Deidra Valles  ??? ? ? ?COPD Management-Oxygen Assessment on??10/10/18.??Satisfied by Ronna Smyth  ??? ? ? ?Influenza Vaccine on??09/24/18.??Satisfied by Deidra Valles  ??? ? ? ?Obesity Screening on??11/12/18.??Satisfied by Deidra Valles  ??? ? ? ?Tetanus Vaccine on??09/24/18.??Satisfied by Deidra Valles  ?  ?     [1]?Office Visit Note -[facility]; Andi SHAH, Jin HUANG 10/10/2018 11:46 CDT

## 2022-05-03 NOTE — HISTORICAL [facility] CERNER
This is a historical note converted from Juan R. Formatting and pictures may have been removed.  Please reference Juan R for original formatting and attached multimedia. Chief Complaint  Wellness- fasting  History of Present Illness  Yearly exam.? Doing well.? She has been off diazepam for about a month?and was wondering if she should resume it.? I advised her to use only as needed for anxiety.? He is fasting for lab.? Has had flu vaccine this season.? Recommend a new shingles vaccine.  Review of Systems  HEENT -?Dr. Mamie leija?eye exam s/p cataracts  ???? Dr. Whitaker prn - headaches/RLS/Neuropathy  CHEST? - COPD/former smoker  C/V - Dr. Chamberlain-heladio/Vianca-prn ??- decreased left radial pulse/SVT/Hyperchol  GI? - Dr Diaz colonoscope polyp - repeat prn  ???? EGD -  esophagitis/gastritis - yrly??????? s/p gastrectomy-ulcer  ????? 10/10/18 Diverticulitis   - nocturia  M/S - Dr. Zavala - s/p bilat tot. knees?? 2016 Fx ankle  HYPOGLYCEMIA - Dr. John Lopez  GYN - Dr. Shankar - heladio?- ??? Left ovary lesion -tests neg  OSTEOPOROSIS - Dr. John Lopez q 6? mos - Reclast  VITAMIN D DEF. -?? ??? ???????   Physical Exam  Vitals & Measurements  BP:?142/83?  HT:?157?cm? HT:?157?cm? WT:?73.4?kg? WT:?73.4?kg? BMI:?29.78?  GEN 84-year-old white female. ?Well-developed. ?Well-nourished. ?No acute distress. ?Alert oriented. ?Ambulates without assistance.? On and off exam table without assistance.  SKIN?clear  PULSE?regular  HEENT?normal  NECK?no bruits  THYROID?clear  CHEST?clear  HEART?regular rate and rhythm without murmur  ABDOMEN?soft nontender no mass  EXTREMITIES?no edema  Assessment/Plan  1.?Medicare annual wellness visit, subsequent?Z00.00  Ordered:  Clinic Follow up, *Est. 19 8:45:00 CST, PLEASE MAKE THIS A 30 MINUTE PHYSICAL, Order for future visit, Medicare annual wellness visit, subsequent, [facility]   Medicare Subsequent Wellness [facility], Medicare annual wellness visit, subsequent  Hypercholesteremia,  TAMIKO GARZA - [facility], 12/05/18 9:45:00 CST  ?  2.?Hypercholesteremia?E78.00  Ordered:   Medicare Subsequent Wellness PC, Medicare annual wellness visit, subsequent  Hypercholesteremia, TAMIKO GARZA - [facility], 12/05/18 9:45:00 CST  ?   Problem List/Past Medical History  Ongoing  Colon polyp  Decreased radial pulse  Emphysema/COPD  Former smoker  Hypercholesteremia  Hypoglycemia  Neuropathy  Osteoporosis  Restless leg  SVT (supraventricular tachycardia)  Vitamin D deficiency  Historical  No qualifying data  Procedure/Surgical History  Right Total knee replacement (06/2013)  Left Total knee replacement (07/17/2012)  Arthroscopy of knee (2012)  Cardiac Ablation (08/2006)  Partial Gastrectomy (1983)  Appendectomy  Bilateral extraction of cataracts  Cholecystectomy  Excision of cervical rib  Excision of meniscus of knee  Iodine-goiter prophylaxis  Release of trigger thumb   Medications  AMLODIPINE BESYLATE 5 MG TAB, 5 mg= 1 tab(s), Oral, Daily  CYANOCOBALAMIN 1,000 MCG/ML  DIAZepam 5 mg oral tablet, See Instructions  METOPROLOL SUCC ER 50 MG TAB, See Instructions  OMEPRAZOLE DR 20 MG CAPSULE, 20 mg= 1 cap(s), Oral  PRAMIPEXOLE 0.75 MG TABLET, See Instructions  PRAVASTATIN SODIUM 40 MG TAB, 40 mg= 1 tab(s), Oral, Once a day (at bedtime)  Allergies  Darvon?(Unknown)  aspirin?(Unknown)  codeine?(unknown)  penicillin?(Unknown)  Social History  Alcohol  Never, 08/22/2018  Employment/School  Retired, 08/22/2018  Exercise  Exercise type: YARDWORK., 09/24/2018  Home/Environment  Lives with Alone., 09/24/2018  Substance Abuse  Never, 08/22/2018  Tobacco  Never smoker, No, 12/05/2018  Never smoker, No, 11/12/2018  Never smoker Use:., 08/22/2018  Family History  Aneurysm: Mother.  Diabetes mellitus: Brother.  Hypertension: Brother.  Lung cancer: Father.  Pacemaker pulse generator, device: Brother.  Stroke: Mother.  Uterine cancer: Mother.  Immunizations  Vaccine Date Status   influenza virus vaccine, inactivated 09/24/2018 Given    tetanus-diphtheria toxoids 09/24/2018 Given   pneumococcal 13-valent conjugate vaccine 03/24/2014 Recorded   pneumococcal 23-polyvalent vaccine 10/28/2008 Recorded   Health Maintenance  Health Maintenance  ???Pending?(in the next year)  ??? ??OverDue  ??? ? ? ?Pneumococcal Vaccine due??and every?  ??? ??Due?  ??? ? ? ?ADL Screening due??12/05/18??and every 1??year(s)  ??? ? ? ?Advance Directive due??12/05/18??and every 1??year(s)  ??? ? ? ?COPD Maintenance-Pulmonary Rehab Education due??12/05/18??and every 1??year(s)  ??? ? ? ?COPD Management-COPD Medications Prescribed due??12/05/18??and every 1??year(s)  ??? ? ? ?Cognitive Screening due??12/05/18??and every 1??year(s)  ??? ? ? ?Fall Risk Assessment due??12/05/18??and every 1??year(s)  ??? ? ? ?Functional Assessment due??12/05/18??and every 1??year(s)  ??? ? ? ?Geriatric Depression Screening due??12/05/18??and every 1??year(s)  ??? ? ? ?Smoking Cessation due??12/05/18??Variable frequency  ??? ? ? ?Zoster Vaccine due??12/05/18??and every 100??year(s)  ??? ??Due In Future?  ??? ? ? ?COPD Management-Oxygen Assessment not due until??10/10/19??and every 1??year(s)  ???Satisfied?(in the past 1 year)  ??? ??Satisfied?  ??? ? ? ?Blood Pressure Screening on??12/05/18.??Satisfied by Ronna Smyth CMA  ??? ? ? ?Body Mass Index Check on??12/05/18.??Satisfied by Ronna Smyth CMA  ??? ? ? ?COPD Management-Oxygen Assessment on??10/10/18.??Satisfied by Ronna Smyth CMA.  ??? ? ? ?Diabetes Screening on??12/05/18.??Satisfied by Keesha Gomes  ??? ? ? ?Influenza Vaccine on??09/24/18.??Satisfied by Deidra Valles LPN  ??? ? ? ?Lipid Screening on??12/05/18.??Satisfied by Keesha Gomes  ??? ? ? ?Obesity Screening on??12/05/18.??Satisfied by Ronna Smyth CMA  ??? ? ? ?Tetanus Vaccine on??09/24/18.??Satisfied by Deidra Valles LPN  ?  ?      12/5/18 LAB:? CBC, Lipids - wnl.? CMP-glucose 119.? U/A - 4-5 rbc.? Low carb diet.? Repeat U/A.   12/10/18 LAB:? repeat  urinalysis - no blood.

## 2022-05-14 NOTE — DISCHARGE SUMMARY
Patient:   Judith Ramirez             MRN: [id number]            FIN: [id number]               Age:   87 years     Sex:  Female     :  10/10/1934   Associated Diagnoses:   None   Author:   Carlton Corona MD      Basic Information     87-year-old female with history of COPD presented with 1 day increase shortness of breath.  She has white sputum production but it was a bit green yesterday.  No fevers chills or sweats.  Room air pulse ox as low as 88%.  Her breathing is worse when she lays flat and better when she sits up.  She is also had a week or 2 of increasing lower extremity edema that improved after 40 mg of IV Lasix.  CT scan showed bilateral mild to moderate pleural effusions and severe COPD changes.  No pneumonic infiltrates.  BNP is elevated indicating increased left ventricular end diastolic dilatation which may be seen in CHF.  673 on evening of admission and after 40 mg of IV Lasix the following morning it was up to 1000.  Patient also has slightly elevated troponin at 0.12 but has past history of troponins in this range.  She is being hospitalized for treatment of COPD exacerbation and further evaluation and treatment of what appears to be some CHF and pleural effusions.    Today treatment will be oxygen, scheduled plus as needed DuoNebs, prednisone 50 mg daily for 5 days, and doxycycline 100 mg twice daily for 5 days.  Nursing states she is a little bit confused now so I will get an ABG to rule out hypercapnia.  New echo has been ordered to assess EF.  CIS consulted for appears to be new onset heart failure.    Today troponin is trending down.  BNP is further elevated so Lasix being increased to 40 mg IV every 8 hours.  Patient unable to lay flat for MRI to evaluate for recent stroke so that is on hold.  In speaking to family it sounds likely the patient has dementia with some owning.  Due to need for ongoing therapy patient is being discharged from observation so she can be admitted to  inpatient bed for further care treatment and evaluation.      Chief Complaint   Weakness      Review of Systems   Pertinent positives as per the HPI the remainder of the 10 systems that were reviewed are negative.      Health Status   Allergies:    Allergic Reactions (Selected)  Severity Not Documented  Aspirin- Unknown.  Clindamycin- Unknown.  Codeine- Unknown.  Darvon- Unknown.  Demerol- Unknown.  Penicillin- Unknown.  Talwin- Unknown.  TraMADol- Unknown.,    Allergies (8) Active Reaction  aspirin Unknown  clindamycin Unknown  codeine unknown  Darvon Unknown  Demerol Unknown  penicillin Unknown  Talwin Unknown  traMADol Unknown     Current medications:  (Selected)   Inpatient Medications  Ordered  Dulcolax Laxative 5 mg ORAL enteric coated tablet: 10 mg, form: Tab-EC, Oral, Daily PRN for constipation, first dose 03/16/22 21:43:00 CDT  DuoNeb 0.5 mg-2.5 mg/3 mL inhalation solution: 3 mL, form: Soln-Inh, NEB, q6hr Resp, first dose 03/17/22 11:00:00 CDT  DuoNeb: 3 mL, form: Soln-Inh, NEB, q4hr PRN for shortness of breath or wheezing, first dose 03/17/22 11:10:00 CDT  Eliquis: 5 mg, form: Tab, Oral, BID, first dose 03/17/22 9:00:00 CDT  Lasix inject.  (IV Push or IM) 10 mg/mL: 40 mg, form: Injection, IV Push, q8hr, first dose 03/18/22 8:00:00 CDT  Pantoprazole 40 mg ORAL EC-Tablet: 40 mg, form: Tab-EC, Oral, Daily, first dose 03/17/22 6:00:00 CDT  Tessalon Perles: 200 mg, form: Cap, Oral, q8hr PRN for cough, first dose 03/16/22 21:43:00 CDT  Zofran 2 mg/mL injectable solution: 4 mg, form: Injection, IV, q4hr PRN for nausea/vomiting, first dose 03/16/22 21:43:00 CDT  cloniDINE 0.1 mg oral tablet: 0.1 mg, form: Tab, Oral, QID PRN for hypertension, first dose 03/16/22 21:43:00 CDT, NOW, PRN SBP > 160  digoxin 125 mcg (0.125 mg) oral tablet: 0.125 mg, form: Tab, Oral, Daily, first dose 03/17/22 9:00:00 CDT  doxycycline: 100 mg, form: Cap CAP, Oral, BID, Order duration: 5 day(s), first dose 03/17/22 11:00:00 CDT, stop date  03/22/22 8:59:00 CDT  hydrALAZINE 20 mg/mL injectable solution: 10 mg, form: Injection, IV, q3hr PRN for hypertension, first dose 03/16/22 21:43:00 CDT, SBP>160, use if NPO or if PRN Clonidine does npot reduce SBP to <160  metoprolol tartrate 1 mg/mL injectable sol: 5 mg, form: Injection, IV Push, q4hr PRN for tachycardia, first dose 03/17/22 11:47:00 CDT, For HR > 110  metoprolol tartrate 25 mg oral tab: 12.5 mg, form: Tab, Oral, BID, first dose 03/17/22 9:00:00 CDT  pramipexole: 0.75 mg, form: Tab, Oral, qPM, first dose 03/17/22 21:00:00 CDT  pravastatin 40 mg oral tablet: 40 mg, form: Tab, Oral, Once a day (at bedtime), first dose 03/17/22 21:00:00 CDT  prednisONE 10 mg oral tablet: 10 mg, form: Tab, Oral, Daily, first dose 03/17/22 11:00:00 CDT  prednisONE 20 mg oral tablet: 40 mg, form: Tab, Oral, Daily, first dose 03/17/22 11:00:00 CDT  Documented Medications  Documented  Dulcolax Laxative 5 mg ORAL enteric coated tablet: 10 mg = 2 tab(s), Oral, Daily, PRN PRN constipation, 0 Refill(s)  DuoNeb 0.5 mg-2.5 mg/3 mL inhalation solution: 3 mL, NEB, q6hr Resp, 0 Refill(s)  DuoNeb: 3 mL, NEB, q4hr, PRN PRN shortness of breath or wheezing, 0 Refill(s)  Eliquis 5 mg oral tablet: 5 mg = 1 tab(s), Oral, BID, 0 Refill(s)  Lasix inject.  (IV Push or IM) 10 mg/mL: 40 mg, IV, q8hr, # 1 EA, 0 Refill(s)  Pantoprazole 40 mg ORAL EC-Tablet: 40 mg = 1 tab(s), Oral, Daily, 0 Refill(s)  Tessalon Perles 100 mg oral capsule: 200 mg = 2 cap(s), Oral, q8hr, PRN PRN cough, 0 Refill(s)  Zofran 2 mg/mL injectable solution: 4 mg = 2 mL, IV, q4hr, PRN PRN nausea/vomiting, 0 Refill(s)  cloniDINE 0.1 mg oral tablet: 0.1 mg = 1 tab(s), Oral, QID, PRN PRN hypertension, 0 Refill(s)  digoxin 125 mcg (0.125 mg) oral tablet: 0.125 mg = 1 tab(s), Oral, Daily, 0 Refill(s)  doxycycline: 100 mg = 1 cap(s), Oral, BID, 0 Refill(s)  hydrALAZINE 20 mg/mL injectable solution: 10 mg = 0.5 mL, IV, q3hr, PRN PRN hypertension, 0 Refill(s)  metoprolol tartrate 1  mg/mL injectable sol: 5 mg = 5 mL, IV Push, q4hr, PRN PRN tachycardia, 0 Refill(s)  metoprolol tartrate 25 mg oral tab: 12.5 mg = 0.5 tab(s), Oral, BID, 0 Refill(s)  pramipexole 0.25 mg oral tablet: 0.75 mg = 3 tab(s), Oral, qPM, 0 Refill(s)  pravastatin 40 mg oral tablet: 40 mg = 1 tab(s), Oral, Once a day (at bedtime), 0 Refill(s)  prednisONE 10 mg oral tablet: 10 mg = 1 tab(s), Oral, Daily, 0 Refill(s)  prednisONE 20 mg oral tablet: 40 mg = 2 tab(s), Oral, Daily, 0 Refill(s)   Problem list:    All Problems  Decreased radial pulse / SNOMED CT 87204044 / Confirmed  Adenocarcinoma of lung / SNOMED CT 746561891 / Confirmed  Atrial fibrillation / SNOMED CT 72638730 / Confirmed  COPD - Chronic obstructive pulmonary disease / SNOMED CT 573185886 / Confirmed  Diverticulosis of intestine / SNOMED CT 9964126008 / Confirmed  Emphysema / SNOMED CT 45148445 / Confirmed  Former smoker / SNOMED CT 55517145 / Confirmed  Gastroparesis / SNOMED CT 672368955 / Confirmed  GERD - Gastro-esophageal reflux disease / SNOMED CT 8794848137 / Confirmed  History of osteoporosis / SNOMED CT 2519123351 / Confirmed  HLD - Hyperlipidemia / SNOMED CT 455276424 / Confirmed  HTN - Hypertension / SNOMED CT 1300526605 / Confirmed  Hypercholesteremia / SNOMED CT 57636970 / Confirmed  Hypoglycemia / SNOMED CT 575683926 / Confirmed  Hypoglycemia / SNOMED CT 638690808 / Confirmed  Lung mass / SNOMED CT 076547842 / Confirmed  Neuropathy / SNOMED CT 2736974870 / Confirmed  Neuropathy / SNOMED CT 5654619798 / Confirmed  Osteoporosis / SNOMED CT 549425395 / Confirmed  Polyp colon / SNOMED CT 8542560377 / Confirmed  Colon polyp / SNOMED CT 745458945 / Confirmed  Pulmonary embolus / SNOMED CT 49318598 / Confirmed  Restless leg / SNOMED CT 03341013 / Confirmed  Restless legs syndrome / SNOMED CT 56998354 / Confirmed  SVT (supraventricular tachycardia) / SNOMED CT 29441016 / Confirmed  [facility] /  Confirmed  Vitamin D deficiency / SNOMED CT 86753440 / Confirmed,    Active Problems (27)  Adenocarcinoma of lung   Atrial fibrillation   Colon polyp   COPD - Chronic obstructive pulmonary disease   Decreased radial pulse   Diverticulosis of intestine   Emphysema   Former smoker   Gastroparesis   GERD - Gastro-esophageal reflux disease   History of osteoporosis   HLD - Hyperlipidemia   HTN - Hypertension   Hypercholesteremia   Hypoglycemia   Hypoglycemia   Lung mass   Neuropathy   Neuropathy   Osteoporosis   Polyp colon   Pulmonary embolus   Restless leg   Restless legs syndrome   SVT (supraventricular tachycardia)   SVT - Supraventricular tachycardia   Vitamin D deficiency         Histories   Past Medical History:    Active  HLD - Hyperlipidemia (611811630)  HTN - Hypertension (3586581172)  GERD - Gastro-esophageal reflux disease (8883952610)  Diverticulosis of intestine (1074174719)  Gastroparesis (769436941)  COPD - Chronic obstructive pulmonary disease (121048833)  Emphysema (39504763)  Hypoglycemia (716387370)  Neuropathy (9157704258)  Restless legs syndrome (86613869)  SVT - Supraventricular tachycardia (6355104249)  History of osteoporosis (2927966957)  Polyp colon (9385760741)  Resolved  Diverticulitis (628538557):  Resolved.   Family History:    Diabetes mellitus  Brother  Hypertension  Brother  Stroke  Mother ()  Pacemaker pulse generator, device  Brother  Aneurysm  Mother ()  Uterine cancer  Mother ()  Lung cancer  Father ()     Procedure history:    Right Lung Biopsy on 12/3/2020 at 86 Years.  Colonoscopy (714660296) on 2014 at 79 Years.  Comments:  2019 7:47 CST - Reech LPNDeidra  Repeat prn due to age and/or comorbidity.  Right Total knee replacement (3347237613) in the month of 2013 at 78 Years.  Comments:  2018 15:27 CDT - Ronna Smyth  right kneww replacement  Arthroscopy of knee (349064232) in  at 78 Years.  Left Total knee replacement  (3843678594) on 7/17/2012 at 77 Years.  Comments:  8/22/2018 15:28 CDT - Ronna Smyth  of the left knee  Cardiac Ablation in the month of 8/2006 at 71 Years.  Partial Gastrectomy (28030896) in 1983 at 49 Years.  Appendectomy (824417470).  Cholecystectomy (64387434).  Iodine-goiter prophylaxis (889313136).  Release of trigger thumb (201510081).  Excision of cervical rib (630065608).  Excision of meniscus of knee (98204360).  Bilateral extraction of cataracts (652545402432189).   Social History        Social & Psychosocial Habits    Alcohol  12/03/2020  Use: Never    Employment/School  08/22/2018  Status: Retired    Exercise  09/24/2018  Exercise type: YARDWORK    Home/Environment  09/24/2018  Lives with: Alone    Nutrition/Health  11/03/2021  Home Diet Regular    Substance Use  12/03/2020  Use: Never    Tobacco  01/26/2022  Use: Former smoker, quit more    Patient Wants Consult For Cessation Counseling No    02/07/2022  Use: Former smoker, quit more    Patient Wants Consult For Cessation Counseling N/A    02/21/2022  Use: Former smoker, quit more    Patient Wants Consult For Cessation Counseling No    03/16/2022  Use: Former smoker, quit more    Patient Wants Consult For Cessation Counseling N/A    Abuse/Neglect  01/26/2022  SHX Any signs of abuse or neglect No    02/21/2022  SHX Any signs of abuse or neglect No    03/16/2022  SHX Any signs of abuse or neglect No    Feels unsafe at home: No    Safe place to go: Yes    Spiritual/Cultural  12/03/2020  [employer]      11/03/2021  Branch of  Never in   .        Physical Examination   Vital Signs   3/18/2022 13:22 CDT      Peripheral Pulse Rate     85 bpm                             Respiratory Rate          20 br/min                             SpO2                      99 %                             Oxygen Therapy            Oxymask                             Oxygen Flow Rate          3 L/min    3/18/2022 11:00 CDT       Temperature Oral          36.8 DegC                             Temperature Oral (calculated)             98.24 DegF                             Peripheral Pulse Rate     83 bpm                             Respiratory Rate          19 br/min                             SpO2                      99 %                             Systolic Blood Pressure   109 mmHg                             Diastolic Blood Pressure  64 mmHg    3/18/2022 10:37 CDT      Oxygen Therapy            Oxymask                             Oxygen Flow Rate          3 L/min    3/18/2022 10:21 CDT      Oxygen Therapy            Oxymask                             Oxygen Flow Rate          4 L/min    3/18/2022 10:03 CDT      Apical Heart Rate         110 bpm  HI    3/18/2022 9:45 CDT       SpO2                      95 %                             Oxygen Therapy            Oxymask                             Oxygen Flow Rate          3 L/min    3/18/2022 8:00 CDT       Oxygen Therapy            Oxymask    3/18/2022 7:43 CDT       Temperature Oral          36.6 DegC                             Temperature Oral (calculated)             97.88 DegF                             Peripheral Pulse Rate     98 bpm                             Systolic Blood Pressure   118 mmHg                             Diastolic Blood Pressure  63 mmHg                             Mean Arterial Pressure, Cuff              82 mmHg    3/18/2022 7:06 CDT       Peripheral Pulse Rate     81 bpm                             Respiratory Rate          20 br/min                             SpO2                      95 %                             Oxygen Therapy            Oxymask                             Oxygen Flow Rate          4 L/min    3/18/2022 3:31 CDT       Temperature Oral          36.9 DegC                             Temperature Oral (calculated)             98.42 DegF                             Peripheral Pulse Rate     84 bpm                             Respiratory  Rate          21 br/min                             SpO2                      94 %                             Systolic Blood Pressure   112 mmHg                             Diastolic Blood Pressure  56 mmHg  LOW                             Mean Arterial Pressure, Cuff              75 mmHg    3/18/2022 0:32 CDT       Heart Rate Monitored      112 bpm  HI                             Respiratory Rate          22 br/min    3/18/2022 0:22 CDT       Heart Rate Monitored      114 bpm  HI                             Respiratory Rate          22 br/min                             SpO2                      96 %                             Oxygen Therapy            Oxymask                             Oxygen Flow Rate          4 L/min    3/17/2022 23:28 CDT      Temperature Oral          36.8 DegC                             Temperature Oral (calculated)             98.24 DegF                             Peripheral Pulse Rate     96 bpm                             Respiratory Rate          22 br/min                             SpO2                      94 %                             Systolic Blood Pressure   119 mmHg                             Diastolic Blood Pressure  65 mmHg                             Mean Arterial Pressure, Cuff              83 mmHg    3/17/2022 19:51 CDT      Heart Rate Monitored      101 bpm  HI                             Respiratory Rate          20 br/min    3/17/2022 19:34 CDT      Heart Rate Monitored      91 bpm                             Respiratory Rate          20 br/min                             SpO2                      94 %                             Oxygen Therapy            Oxymask                             Oxygen Flow Rate          3 L/min    3/17/2022 19:11 CDT      Temperature Oral          36.7 DegC                             Temperature Oral (calculated)             98.06 DegF                             Peripheral Pulse Rate     89 bpm                             Respiratory  Rate          32 br/min  HI                             SpO2                      94 %                             Systolic Blood Pressure   137 mmHg                             Diastolic Blood Pressure  70 mmHg                             Mean Arterial Pressure, Cuff              93 mmHg    3/17/2022 16:00 CDT      Temperature Oral          36.6 DegC                             Temperature Oral (calculated)             97.88 DegF                             Peripheral Pulse Rate     98 bpm                             SpO2                      96 %                             Systolic Blood Pressure   122 mmHg                             Diastolic Blood Pressure  76 mmHg    3/17/2022 14:00 CDT      Oxygen Therapy            Oxymask                             Oxygen Flow Rate          4 L/min    3/17/2022 13:44 CDT      Peripheral Pulse Rate     110 bpm  HI                             Respiratory Rate          18 br/min                             SpO2                      97 %                             Oxygen Therapy            Oxymask                             Oxygen Flow Rate          4 L/min    3/17/2022 12:04 CDT      Oxygen Therapy            Oxymask                             Oxygen Flow Rate          4 L/min    3/17/2022 11:20 CDT      Peripheral Pulse Rate     103 bpm  HI                             SpO2                      95 %                             Systolic Blood Pressure   121 mmHg                             Diastolic Blood Pressure  81 mmHg                             Mean Arterial Pressure, Cuff              94 mmHg    3/17/2022 11:00 CDT      Temperature Oral          36.5 DegC                             Temperature Oral (calculated)             97.70 DegF    3/17/2022 10:09 CDT      Apical Heart Rate         82 bpm    3/17/2022 10:00 CDT      SpO2                      97 %                             Oxygen Therapy            Oxymask                             Oxygen Flow Rate           4 L/min    3/17/2022 9:31 CDT       SpO2                      95 %                             Oxygen Therapy            Oxymask                             Oxygen Flow Rate          4 L/min    3/17/2022 9:30 CDT       SpO2                      83 %  LOW                             Oxygen Therapy            Room air    3/17/2022 9:00 CDT       SpO2                      In Error %  (In Error)                            Oxygen Therapy            In Error  (In Error)                            Oxygen Flow Rate          In Error L/min  (In Error)   3/17/2022 8:00 CDT       Oxygen Therapy            Room air    3/17/2022 7:45 CDT       Peripheral Pulse Rate     100 bpm                             Respiratory Rate          18 br/min                             SpO2                      95 %                             Oxygen Therapy            Room air    3/17/2022 7:18 CDT       Peripheral Pulse Rate     100 bpm                             SpO2                      93 %  LOW                             Systolic Blood Pressure   115 mmHg                             Diastolic Blood Pressure  70 mmHg                             Mean Arterial Pressure, Cuff              85 mmHg    3/17/2022 7:00 CDT       Temperature Oral          36.5 DegC                             Temperature Oral (calculated)             97.70 DegF    3/17/2022 3:49 CDT       Temperature Oral          36.4 DegC                             Temperature Oral (calculated)             97.52 DegF                             Peripheral Pulse Rate     99 bpm                             Respiratory Rate          19 br/min                             SpO2                      94 %                             Systolic Blood Pressure   104 mmHg                             Diastolic Blood Pressure  59 mmHg  LOW                             Mean Arterial Pressure, Cuff              76 mmHg    3/17/2022 0:24 CDT       Heart Rate Monitored      107 bpm  HI                              Respiratory Rate          26 br/min  HI    3/17/2022 0:17 CDT       Heart Rate Monitored      97 bpm                             Respiratory Rate          28 br/min  HI                             SpO2                      92 %  LOW                             Oxygen Therapy            Oxymask                             Oxygen Flow Rate          8 L/min        Vital Signs (last 24 hrs)_____  Last Charted___________  Temp Oral     36.8 DegC  (MAR 18 11:00)  Heart Rate Peripheral   85 bpm  (MAR 18 13:22)  Resp Rate         20 br/min  (MAR 18 13:22)  SBP      109 mmHg  (MAR 18 11:00)  DBP      64 mmHg  (MAR 18 11:00)  SpO2      99 %  (MAR 18 13:22)  Weight      58.1 kg  (MAR 18 05:00)     General: well-developed well-nourished in no acute distress  Eye: PERRLA, EOMI, clear conjunctiva, eyelids normal  HENT: Oropharynx without erythema/exudate, oropharynx and nasal mucosal surfaces moist  Neck: No thyromegaly or lymphadenopathy  Respiratory: +clear to auscultation bilaterally but tight breath sounds and reduced in the bilateral bases  Cardiovascular:+ Irregularly irregular rate and rhythm without murmurs, gallops or rubs  Gastrointestinal: soft, non-tender, non-distended with normal bowel sounds, without masses to palpation  Integumentary: no rashes or skin lesions present  Neurologic: cranial nerves grossly intact, no signs of peripheral neurological deficit  Psych: Appropriate affect, not anxious or depressed      Impression and Plan     Acute hypoxic respiratory failure secondary to COPD exacerbation + probable CHF exacerbation, and + bilateral pleural effusions  -Oxygen as needed  -Doxycycline 100 mg twice daily x5 days  -Prednisone 50 mg daily x5 days  -DuoNebs every 6 hours plus every 4 hours as needed  -Sputum culture  -Lasix 40 mg IV every 12 hours and can likely reduce dose in the next 24 to 48 hours  -Serial BMPs  -Repeat chest x-ray  -CIS consult  -Follow-up on EKG once tracing can be  located    Elevated troponin  -Suspect type II NSTEMI due to demand ischemia  -Recheck troponin  -Echo ordered  -CIS consulted    Valvular heart disease with mitral regurgitation  Past history of adenocarcinoma of lung s/p XRT  Essential hypertension  History of PE still under treatment with Eliquis  Dyslipidemia  Pulmonary hypertension  Chronic right bundle branch block  GERD  -All stable    CODE STATUS is DNR  VTE prophylaxis with Eliquis that she takes for her recent PE    Discharge time greater than 30 minutes

## 2022-05-14 NOTE — H&P
Patient:   Judith Ramirez             MRN: [id number]            FIN: [id number]               Age:   87 years     Sex:  Female     :  10/10/1934   Associated Diagnoses:   None   Author:   Carlton Corona MD      Basic Information     87-year-old female with history of COPD presented with 1 day increase shortness of breath.  She has white sputum production but it was a bit green yesterday.  No fevers chills or sweats.  Room air pulse ox as low as 88%.  Her breathing is worse when she lays flat and better when she sits up.  She is also had a week or 2 of increasing lower extremity edema that improved after 40 mg of IV Lasix.  CT scan showed bilateral mild to moderate pleural effusions and severe COPD changes.  No pneumonic infiltrates.  BNP is elevated indicating increased left ventricular end diastolic dilatation which may be seen in CHF.  673 on evening of admission and after 40 mg of IV Lasix the following morning it was up to 1000.  Patient also has slightly elevated troponin at 0.12 but has past history of troponins in this range.  She is being hospitalized for treatment of COPD exacerbation and further evaluation and treatment of what appears to be some CHF and pleural effusions.    Today treatment will be oxygen, scheduled plus as needed DuoNebs, prednisone 50 mg daily for 5 days, and doxycycline 100 mg twice daily for 5 days.  Nursing states she is a little bit confused now so I will get an ABG to rule out hypercapnia.  New echo has been ordered to assess EF.  CIS consulted for appears to be new onset heart failure.      Chief Complaint   Weakness   3/16/2022 18:23 CDT      Shortness of breath x1 day. GCS 15 upon arrival, former smoker. Arrived via [facility].        Review of Systems   Pertinent positives as per the HPI the remainder of the 10 systems that were reviewed are negative.      Health Status   Allergies:    Allergic Reactions (Selected)  Severity Not Documented  Aspirin- Unknown.  Clindamycin-  Unknown.  Codeine- Unknown.  Darvon- Unknown.  Demerol- Unknown.  Penicillin- Unknown.  Talwin- Unknown.  TraMADol- Unknown.,    Allergies (8) Active Reaction  aspirin Unknown  clindamycin Unknown  codeine unknown  Darvon Unknown  Demerol Unknown  penicillin Unknown  Talwin Unknown  traMADol Unknown     Current medications:  (Selected)   Inpatient Medications  Ordered  Dulcolax Laxative 5 mg ORAL enteric coated tablet: 10 mg, form: Tab-EC, Oral, Daily PRN for constipation, first dose 03/16/22 21:43:00 CDT  DuoNeb 0.5 mg-2.5 mg/3 mL inhalation solution: 3 mL, form: Soln-Inh, NEB, q6hr, first dose 03/17/22 11:00:00 CDT  DuoNeb: 3 mL, form: Soln-Inh, NEB, q4hr PRN for shortness of breath or wheezing, first dose 03/17/22 11:10:00 CDT  Eliquis: 5 mg, form: Tab, Oral, BID, first dose 03/17/22 9:00:00 CDT  Lasix inject.  (IV Push or IM) 10 mg/mL: 40 mg, form: Injection, IV Push, BID, first dose 03/17/22 22:00:00 CDT  Pantoprazole 40 mg ORAL EC-Tablet: 40 mg, form: Tab-EC, Oral, Daily, first dose 03/17/22 6:00:00 CDT  Tessalon Perles: 200 mg, form: Cap, Oral, q8hr PRN for cough, first dose 03/16/22 21:43:00 CDT  Zofran 2 mg/mL injectable solution: 4 mg, form: Injection, IV, q4hr PRN for nausea/vomiting, first dose 03/16/22 21:43:00 CDT  cloniDINE 0.1 mg oral tablet: 0.1 mg, form: Tab, Oral, QID PRN for hypertension, first dose 03/16/22 21:43:00 CDT, NOW, PRN SBP > 160  digoxin 125 mcg (0.125 mg) oral tablet: 0.125 mg, form: Tab, Oral, Daily, first dose 03/17/22 9:00:00 CDT  doxycycline: 100 mg, form: Cap CAP, Oral, BID, Order duration: 5 day(s), first dose 03/17/22 11:00:00 CDT, stop date 03/22/22 8:59:00 CDT  hydrALAZINE 20 mg/mL injectable solution: 10 mg, form: Injection, IV, q3hr PRN for hypertension, first dose 03/16/22 21:43:00 CDT, SBP>160, use if NPO or if PRN Clonidine does npot reduce SBP to <160  metoprolol tartrate 25 mg oral tab: 12.5 mg, form: Tab, Oral, BID, first dose 03/17/22 9:00:00 CDT  pramipexole: 0.75  mg, form: Tab, Oral, qPM, first dose 03/17/22 21:00:00 CDT  pravastatin 40 mg oral tablet: 40 mg, form: Tab, Oral, Once a day (at bedtime), first dose 03/17/22 21:00:00 CDT  predniSONE: 50 mg, form: Tab, Oral, Daily, first dose 03/17/22 11:00:00 CDT  Prescriptions  Prescribed  Albuterol (Eqv-Proventil HFA) 90 mcg/inh inhalation aerosol: = 2 puff(s), INH, q6hr, PRN PRN shortness of breath or wheezing, # 6.7 gm, 3 Refill(s), Pharmacy: [facility], 158, cm, Height/Length Dosing, 02/21/22 13:22:00 CST, 63.5, kg, Weight Dosing, 02/21/22 13:17:00 CST  Eliquis 5 mg oral tablet: 5 mg = 1 tab(s), Oral, BID, # 60 tab(s), 0 Refill(s), Pharmacy: [id number], 157, cm, Height/Length Dosing, 12/21/21 15:09:00 CST, 67.6, kg, Weight Dosing, 12/21/21 15:09:00 CST  Stiolto Respimat 60 ACT 2.5 mcg-2.5 mcg/inh inhalation aerosol: = 2 puff(s), INH, q24hr, Stiolto will replace Anoro, # 3 EA, 3 Refill(s), Pharmacy: [id number], 157, cm, Height/Length Dosing, 12/21/21 15:09:00 CST, 67.6, kg, Weight Dosing, 12/21/21 15:09:00 CST  digoxin 125 mcg (0.125 mg) oral tablet: 0.125 mg = 1 tab(s), Oral, Daily, # 30 tab(s), 0 Refill(s), Pharmacy: [id number], 157, cm, Height/Length Dosing, 12/21/21 15:09:00 CST, 67.6, kg, Weight Dosing, 12/21/21 15:09:00 CST  furosemide 20 mg oral tablet: 20 mg = 1 tab(s), Oral, Daily, # 90 tab(s), 1 Refill(s), Pharmacy: [facility], 158, cm, Height/Length Dosing, 08/03/21 10:17:00 CDT, 68.5, kg, Weight Dosing, 08/03/21 10:17:00 CDT  gabapentin 100 mg oral capsule: 200 mg = 2 cap(s), Oral, Daily, 200 mg once a day at bedtime, # 180 cap(s), 3 Refill(s), Pharmacy: [facility], 157, cm, Height/Length Dosing, 12/21/21 15:09:00 CST, 61.2, kg, Weight Dosing, 01/20/22 11:12:00 CST  metoprolol tartrate 25 mg oral tab: 12.5 mg = 0.5 tab(s), Oral, BID, # 30 tab(s), 0 Refill(s), Pharmacy: [id number], 157, cm, Height/Length Dosing, 12/21/21  15:09:00 CST, 67.6, kg, Weight Dosing, 12/21/21 15:09:00 CST  pramipexole 0.75 mg oral tablet: 0.75 mg = 1 tab(s), Oral, qPM, # 90 tab(s), 3 Refill(s), Pharmacy: [facility], 157, cm, Height/Length Dosing, 12/21/21 15:09:00 CST, 61.2, kg, Weight Dosing, 01/20/22 11:12:00 CST  Documented Medications  Documented  CYANOCOBALAMIN 1,000 MCG/ML:   MiraLax (polyethylene glycol 3350): 17 gm, Oral, Daily, dissolve in water before taking, 0 Refill(s)  Os-Sushant 500 + D: 1 tab(s), Oral, BID, 0 Refill(s)  PRAVASTATIN SODIUM 40 MG TAB: 40 mg = 1 tab(s), Oral, Once a day (at bedtime)  Probiotic Formula (Bacillus Coagulans) oral capsule: 1 cap(s), Oral, Daily, # 30 cap(s), 0 Refill(s)  Tylenol 325 mg oral tablet: 325 mg = 1 tab(s), Oral, q4hr, PRN PRN fever, 0 Refill(s)  baclofen 5 mg oral tablet: 5 mg = 1 tab(s), Oral, TID  famotidine 40 mg oral tablet: 40 mg = 1 tab(s), Oral, Once a day (at bedtime), # 30 tab(s), 0 Refill(s)  melatonin 1 mg oral tablet: 1 mg = 1 tab(s), Oral, Once a day (at bedtime), PRN PRN for insomnia, # 90 tab(s), 0 Refill(s)  omeprazole 20 mg oral DR capsule: 20 mg = 1 cap(s), Oral, Daily   Problem list:    All Problems  Decreased radial pulse / SNOMED CT 28028141 / Confirmed  Adenocarcinoma of lung / SNOMED CT 693595531 / Confirmed  Atrial fibrillation / SNOMED CT 44771296 / Confirmed  COPD - Chronic obstructive pulmonary disease / SNOMED CT 608925303 / Confirmed  Diverticulosis of intestine / SNOMED CT 8493906095 / Confirmed  Emphysema / SNOMED CT 98250890 / Confirmed  Former smoker / SNOMED CT 30864338 / Confirmed  Gastroparesis / SNOMED CT 614120133 / Confirmed  GERD - Gastro-esophageal reflux disease / SNOMED CT 4179339606 / Confirmed  History of osteoporosis / SNOMED CT 2329544575 / Confirmed  HLD - Hyperlipidemia / SNOMED CT 538235959 / Confirmed  HTN - Hypertension / SNOMED CT 2395811148 / Confirmed  Hypercholesteremia / SNOMED CT 08306765 / Confirmed  Hypoglycemia / SNOMED CT  601344807 / Confirmed  Hypoglycemia / SNOMED CT 717124473 / Confirmed  Lung mass / SNOMED CT 595428932 / Confirmed  Neuropathy / SNOMED CT 9141989458 / Confirmed  Neuropathy / SNOMED CT 3964269536 / Confirmed  Osteoporosis / SNOMED CT 234640787 / Confirmed  Polyp colon / SNOMED CT 7199347591 / Confirmed  Colon polyp / SNOMED CT 640582718 / Confirmed  Pulmonary embolus / SNOMED CT 90634528 / Confirmed  Restless leg / SNOMED CT 07730706 / Confirmed  Restless legs syndrome / SNOMED CT 23657846 / Confirmed  SVT (supraventricular tachycardia) / SNOMED CT 74712668 / Confirmed  SVT - Supraventricular tachycardia / SNOMED CT 5778093068 / Confirmed  Vitamin D deficiency / SNOMED CT 67561980 / Confirmed,    Active Problems (27)  Adenocarcinoma of lung   Atrial fibrillation   Colon polyp   COPD - Chronic obstructive pulmonary disease   Decreased radial pulse   Diverticulosis of intestine   Emphysema   Former smoker   Gastroparesis   GERD - Gastro-esophageal reflux disease   History of osteoporosis   HLD - Hyperlipidemia   HTN - Hypertension   Hypercholesteremia   Hypoglycemia   Hypoglycemia   Lung mass   Neuropathy   Neuropathy   Osteoporosis   Polyp colon   Pulmonary embolus   Restless leg   Restless legs syndrome   SVT (supraventricular tachycardia)   SVT - Supraventricular tachycardia   Vitamin D deficiency         Histories   Past Medical History:    Active  HLD - Hyperlipidemia (253402829)  HTN - Hypertension (6226894751)  GERD - Gastro-esophageal reflux disease (3883626251)  Diverticulosis of intestine (8413881367)  Gastroparesis (156440264)  COPD - Chronic obstructive pulmonary disease (652836478)  Emphysema (45248082)  Hypoglycemia (737860123)  Neuropathy (9197483305)  Restless legs syndrome (74924158)  SVT - Supraventricular tachycardia (8979270946)  History of osteoporosis (0486192453)  Polyp colon (0504219838)  Resolved  Diverticulitis (900986813):  Resolved.   Family History:    Diabetes  mellitus  Brother  Hypertension  Brother  Stroke  Mother ()  Pacemaker pulse generator, device  Brother  Aneurysm  Mother ()  Uterine cancer  Mother ()  Lung cancer  Father ()     Procedure history:    Right Lung Biopsy on 12/3/2020 at 86 Years.  Colonoscopy (080375762) on 2014 at 79 Years.  Comments:  2019 7:47 CST - Reech LPN, Deidra  Repeat prn due to age and/or comorbidity.  Right Total knee replacement (8615975386) in the month of 2013 at 78 Years.  Comments:  2018 15:27 Ronna Saleh  right kneww replacement  Arthroscopy of knee (787754477) in  at 78 Years.  Left Total knee replacement (6189199017) on 2012 at 77 Years.  Comments:  2018 15:28 Ronna Saleh  of the left knee  Cardiac Ablation in the month of 2006 at 71 Years.  Partial Gastrectomy (26612765) in  at 49 Years.  Appendectomy (948101375).  Cholecystectomy (24022923).  Iodine-goiter prophylaxis (465778816).  Release of trigger thumb (942111403).  Excision of cervical rib (691261519).  Excision of meniscus of knee (49469197).  Bilateral extraction of cataracts (973361583771742).   Social History        Social & Psychosocial Habits    Alcohol  2020  Use: Never    Employment/School  2018  Status: Retired    Exercise  2018  Exercise type: YARDWORK    Home/Environment  2018  Lives with: Alone    Nutrition/Health  2021  Home Diet Regular    Substance Use  2020  Use: Never    Tobacco  2022  Use: Former smoker, quit more    Patient Wants Consult For Cessation Counseling No    2022  Use: Former smoker, quit more    Patient Wants Consult For Cessation Counseling N/A    2022  Use: Former smoker, quit more    Patient Wants Consult For Cessation Counseling No    2022  Use: Former smoker, quit more    Patient Wants Consult For Cessation Counseling N/A    Abuse/Neglect  2022  SHX Any signs of abuse or neglect  No    02/21/2022  SHX Any signs of abuse or neglect No    03/16/2022  SHX Any signs of abuse or neglect No    Feels unsafe at home: No    Safe place to go: Yes    Spiritual/Cultural  12/03/2020  Gnosticism Preference Oriental orthodox      11/03/2021  Branch of  Never in   .        Physical Examination   Vital Signs   3/17/2022 10:09 CDT      Apical Heart Rate         82 bpm    3/17/2022 10:00 CDT      SpO2                      97 %                             Oxygen Therapy            Oxymask                             Oxygen Flow Rate          4 L/min    3/17/2022 9:30 CDT       SpO2                      83 %  LOW                             Oxygen Therapy            Room air    3/17/2022 9:00 CDT       SpO2                      95 %                             Oxygen Therapy            Oxymask                             Oxygen Flow Rate          4 L/min    3/17/2022 8:00 CDT       Oxygen Therapy            Room air    3/17/2022 7:45 CDT       Peripheral Pulse Rate     100 bpm                             Respiratory Rate          18 br/min                             SpO2                      95 %                             Oxygen Therapy            Room air    3/17/2022 7:18 CDT       Peripheral Pulse Rate     100 bpm                             SpO2                      93 %  LOW                             Systolic Blood Pressure   115 mmHg                             Diastolic Blood Pressure  70 mmHg                             Mean Arterial Pressure, Cuff              85 mmHg    3/17/2022 7:00 CDT       Temperature Oral          36.5 DegC                             Temperature Oral (calculated)             97.70 DegF    3/17/2022 3:49 CDT       Temperature Oral          36.4 DegC                             Temperature Oral (calculated)             97.52 DegF                             Peripheral Pulse Rate     99 bpm                             Respiratory Rate          19 br/min                              SpO2                      94 %                             Systolic Blood Pressure   104 mmHg                             Diastolic Blood Pressure  59 mmHg  LOW                             Mean Arterial Pressure, Cuff              76 mmHg    3/17/2022 0:24 CDT       Heart Rate Monitored      107 bpm  HI                             Respiratory Rate          26 br/min  HI    3/17/2022 0:17 CDT       Heart Rate Monitored      97 bpm                             Respiratory Rate          28 br/min  HI                             SpO2                      92 %  LOW                             Oxygen Therapy            Oxymask                             Oxygen Flow Rate          8 L/min    3/16/2022 23:29 CDT      Peripheral Pulse Rate     105 bpm  HI                             SpO2                      94 %                             Systolic Blood Pressure   156 mmHg  HI                             Diastolic Blood Pressure  85 mmHg                             Mean Arterial Pressure, Cuff              108 mmHg    3/16/2022 22:55 CDT      Peripheral Pulse Rate     97 bpm                             Heart Rate Monitored      114 bpm  HI                             Respiratory Rate          26 br/min  HI                             SpO2                      97 %                             Systolic Blood Pressure   123 mmHg                             Diastolic Blood Pressure  80 mmHg    3/16/2022 22:45 CDT      Heart Rate Monitored      102 bpm  HI                             Respiratory Rate          26 br/min  HI                             SpO2                      95 %                             Oxygen Therapy            Oxymask                             Oxygen Flow Rate          8 L/min    3/16/2022 22:33 CDT      Heart Rate Monitored      89 bpm                             Respiratory Rate          24 br/min                             SpO2                      95 %                              Oxygen Therapy            Oxymask                             Oxygen Flow Rate          8 L/min    3/16/2022 22:19 CDT      SpO2                      90 %  LOW                             Oxygen Therapy            Nasal cannula                             Oxygen Flow Rate          4 L/min    3/16/2022 21:30 CDT      Peripheral Pulse Rate     85 bpm                             Heart Rate Monitored      97 bpm                             Respiratory Rate          19 br/min                             SpO2                      92 %  LOW                             Oxygen Therapy            Room air                             Systolic Blood Pressure   142 mmHg  HI                             Diastolic Blood Pressure  68 mmHg                             Mean Arterial Pressure, Cuff              93 mmHg    3/16/2022 20:30 CDT      Peripheral Pulse Rate     82 bpm                             Heart Rate Monitored      79 bpm                             Respiratory Rate          25 br/min  HI                             SpO2                      93 %  LOW                             Oxygen Therapy            Room air                             Systolic Blood Pressure   127 mmHg                             Diastolic Blood Pressure  71 mmHg                             Mean Arterial Pressure, Cuff              90 mmHg    3/16/2022 19:02 CDT      Peripheral Pulse Rate     85 bpm                             Heart Rate Monitored      85 bpm                             Respiratory Rate          20 br/min  (Modified)                            SpO2                      92 %  LOW                             Oxygen Therapy            Room air                             Systolic Blood Pressure   115 mmHg                             Diastolic Blood Pressure  80 mmHg                             Mean Arterial Pressure, Cuff              92 mmHg    3/16/2022 18:23 CDT      Temperature Temporal Artery               36.3 DegC                              Respiratory Rate          16 br/min                             SpO2                      92 %  LOW                             Oxygen Therapy            Room air                             Systolic Blood Pressure   124 mmHg                             Diastolic Blood Pressure  84 mmHg        Vital Signs (last 24 hrs)_____  Last Charted___________  Temp Oral     36.5 DegC  (MAR 17 07:00)  Heart Rate Apical    82 bpm  (MAR 17 10:09)  Resp Rate         18 br/min  (MAR 17 07:45)  SBP      115 mmHg  (MAR 17 07:18)  DBP      70 mmHg  (MAR 17 07:18)  SpO2      97 %  (MAR 17 10:00)  Weight      61.0 kg  (MAR 16 22:25)  Height      158 cm  (MAR 16 22:25)  BMI      24.44  (MAR 16 22:25)     General: well-developed well-nourished in no acute distress  Eye: PERRLA, EOMI, clear conjunctiva, eyelids normal  HENT: Oropharynx without erythema/exudate, oropharynx and nasal mucosal surfaces moist  Neck: No thyromegaly or lymphadenopathy  Respiratory: +clear to auscultation bilaterally but tight breath sounds and reduced in the bilateral bases  Cardiovascular:+ Irregularly irregular rate and rhythm without murmurs, gallops or rubs  Gastrointestinal: soft, non-tender, non-distended with normal bowel sounds, without masses to palpation  Integumentary: no rashes or skin lesions present  Neurologic: cranial nerves grossly intact, no signs of peripheral neurological deficit  Psych: Appropriate affect, not anxious or depressed      Impression and Plan     Acute hypoxic respiratory failure secondary to COPD exacerbation + probable CHF exacerbation, and + bilateral pleural effusions  -Oxygen as needed  -Doxycycline 100 mg twice daily x5 days  -Prednisone 50 mg daily x5 days  -DuoNebs every 6 hours plus every 4 hours as needed  -Sputum culture  -Lasix 40 mg IV every 12 hours and can likely reduce dose in the next 24 to 48 hours  -Serial BMPs  -Repeat chest x-ray  -CIS consult  -Follow-up on EKG once tracing can be  located    Elevated troponin  -Suspect type II NSTEMI due to demand ischemia  -Recheck troponin  -Echo ordered  -CIS consulted    Valvular heart disease with mitral regurgitation  Past history of adenocarcinoma of lung s/p XRT  Essential hypertension  History of PE still under treatment with Eliquis  Dyslipidemia  Pulmonary hypertension  Chronic right bundle branch block  GERD  -All stable    CODE STATUS is DNR  VTE prophylaxis with Eliquis that she takes for her recent PE

## 2022-05-21 NOTE — HISTORICAL [facility]
This is a historical note converted from [employer]. Formatting and pictures may have been removed.  Please reference [employer] for original formatting and attached multimedia. Admit and Discharge Dates  Admit Date: 03/22/2022  Discharge Date: 04/01/2022  Physicians  Attending Physician - Chloe SHAH, Carlton ALEJANDRO  Admitting Physician - Chloe SHAH, Carlton ALEJANDRO  Consulting Physician - Eden SHAH, Silverio  Primary Care Physician - Buffy SHAH, Allie ALEJANDRO  Discharge Diagnosis  1.?Debility?R53.81  2.?COPD exacerbation?J44.1  3.?Elevated troponin?R77.8  4.?Acute on chronic diastolic CHF (congestive heart failure)?I50.33  5.?Unintentional weight loss?R63.4  Surgical Procedures  No procedures recorded for this visit.  Immunizations  No immunizations recorded for this visit.  Admission Information  ?  Pt is a pleasant ?87-year-old female with past medical history of COPD, HPL, prior PE, HTN, valvular heart disease, and prior lung cancer ?presented with 1 day increase shortness of breath. No fevers chills or sweats. ?Room air pulse ox as low as 88%. ?Her breathing is worse when she lays flat and better when she sits up. ?She is also had a week or 2 of increasing lower extremity edema that improved after 40 mg of IV Lasix. ?CT scan showed bilateral mild to moderate pleural effusions and severe COPD changes. ?No pneumonic infiltrates. ?BNP elevated and trending down with Lasix . ?Echo only showed some LVH . ?Troponins were elevated and have trended down consistent with demand ischemia. ?Sputum positive for many yeast and patient has been started on nystatin.?  ?   Diagnostic Results  CXR 3/21/2022: Slight interval improving right perihilar opacity.  CXR 3/17/2022: Right midlung zone opacification appears slightly improved when compared to prior.  CTA of chest 3/16/2022: 1. Negative for pulmonary thromboembolic disease. 2. Small to moderate bilateral pleural effusions, right greater than  left. 3. Similar irregular opacity superior segment right  lower lobe.  Significant Findings  ?  1. Debility R53.81  -Continue PT/OT.  ?   2.?COPD exacerbation J44.9  ?-Improved. Continue duonebs every 6 hours. She is no longer on prednisone or doxycycline.  ?   3.?Elevated troponin?R77.8  ?-Likely type II NSTEMI due to demand ischemia. She was evaluated by [facility] on 3/17/2022 and no further cardiac workup was recommended.  ?   4.?Acute on chronic diastolic CHF exacerbation I50.33  -Continue oral lasix. Echocardiogram from 3/18/2022 showed NLVSF with an EF of 60% and concentric hypertrophy.  ?   5. Unintentional weight loss R63.4  -Continue remeron which was started on 3/28/22.  ?  6. Paroxysmal atrial fibrillation?I48.91  ?-Continue?digoxin, beta blocker, and eliquis.  ?  7.?History of pulmonary embolism?Z86.711  ?-Continue eliquis.  ?  8.?Hyperlipidemia?E78.5  -Continue pravastatin.  ?  9.?Adenocarcinoma of lung?C34.90  -No acute issues.?CTA of the chest from 3/16/22 revealed a similar irregular opacity in the superior segment right lower lobe.  ?  10.?RLS G25.81  -Continue pramipexole.  ?  11. Depression F32.9  -Continue remeron.  ?  12. Insomnia G47.00  -Continue trazodone and melatonin.  ?  13. HTN I10  -Continue metoprolol tartrate.  ?  14. Code status  -DNR.  ?  15. Disposition  -Anticipate discharge  ?  Service was provided using HIP[employer] complaint web platform using SOC for audio/visual equipment  Patient Location:[place]  Provider Locatfion:[facility], [facility], [place], [place]  Participants on call:bedside RN, patient  Physician on call: Dr. Petit  Time Spent on discharge  35 min  Objective  Vitals & Measurements  T:?36.5? ?C (Oral)? TMIN:?36.4? ?C (Oral)? TMAX:?36.8? ?C (Oral)? HR:?70(Peripheral)? RR:?18? BP:?122/69? SpO2:?96%? BMI:?21.63?  Physical Exam  ?  General:?weak and frail appearing, ?in no acute distress, in good spirits, anxious to go home  Eye: anicteric  HENT:?normocephalic  Neck: full range of motion,  Respiratory:?clear to auscultation  bilaterally  Cardiovascular:?regular rate and rhythm  Gastrointestinal:?soft, non-tender  ?  Patient Discharge Condition  stable  Discharge Disposition  home with home naveed   Discharge Medication Reconciliation  Prescribed  acetaminophen (Tylenol 325 mg oral tablet)?650 mg, Oral, q4hr, PRN fever  albuterol-ipratropium (DuoNeb 0.5 mg-2.5 mg/3 mL inhalation solution)?3 mL, NEB, q6hr Resp  apixaban (Eliquis 5 mg oral tablet)?5 mg, Oral, BID  bisacodyl (Dulcolax Laxative 5 mg ORAL enteric coated tablet)?10 mg, Oral, Daily, PRN constipation  digoxin (digoxin 125 mcg (0.125 mg) oral tablet)?0.125 mg, Oral, Daily  diphenhydrAMINE (Banophen 25 mg oral tablet)?25 mg, Oral, TID, PRN as needed for itching  furosemide (Lasix 20 mg oral tablet)?40 mg, Oral, Every other day  melatonin ([facility] melatonin 3 mg oral tablet)?3 mg, Oral, At Bedtime  metoprolol (metoprolol tartrate 25 mg oral tab)?12.5 mg, Oral, BID  mirtazapine (Remeron 15 mg oral tablet)?15 mg, Oral, Once a day (at bedtime)  polyethylene glycol 3350 (MiraLax (polyethylene glycol 3350))?17 gm, Oral, Daily, PRN constipation  potassium chloride (potassium chloride 20 mEq oral TABLET extended release)?20 mEq, Oral, Every other day  pramipexole (pramipexole 0.25 mg oral tablet)?0.75 mg, Oral, qPM  pravastatin (pravastatin 40 mg oral tablet)?40 mg, Oral, Once a day (at bedtime)  traZODone (traZODone 100 mg oral tablet)?100 mg, Oral, Once a day (at bedtime)  zolpidem (Ambien 5 mg oral tablet)?5 mg, Oral, At Bedtime, PRN insomnia  Continue  nystatin (nystatin 100,000 units/mL oral suspension)?500,000 units, Oral, QID  Discontinue  albuterol-ipratropium (DuoNeb 0.5 mg-2.5 mg/3 mL inhalation solution)?3 mL, NEB, q6hr Resp  albuterol-ipratropium (DuoNeb)?3 mL, NEB, q4hr, PRN shortness of breath or wheezing  apixaban (Eliquis 5 mg oral tablet)?5 mg, Oral, BID  digoxin (digoxin 125 mcg (0.125 mg) oral tablet)?0.125 mg, Oral, Daily  doxycycline?100 mg, Oral, BID  furosemide (Lasix  20 mg oral tablet)?40 mg, Oral, Daily  melatonin ([facility] melatonin 3 mg oral tablet)?3 mg, Oral, At Bedtime  metoprolol (metoprolol tartrate 25 mg oral tab)?12.5 mg, Oral, BID  potassium chloride (KCL 20 mEq Oral Tab)?20 mEq, Oral, Daily  potassium chloride (KCL 20 mEq Oral Tab)?20 mEq, Oral, Daily  pramipexole (pramipexole 0.75 mg oral tablet)?0.75 mg, Oral, qPM  pravastatin (pravastatin 40 mg oral tablet)?40 mg, Oral, Once a day (at bedtime)  predniSONE (prednisONE 10 mg oral tablet)?10 mg, Oral, Daily  predniSONE (prednisONE 20 mg oral tablet)?40 mg, Oral, Daily  traZODone (traZODone 100 mg oral tablet)?100 mg, Oral, Once a day (at bedtime)  Education and Orders Provided  Community-Acquired Pneumonia, Adult  Chronic Obstructive Pulmonary Disease  Deconditioning  Hypertension, Adult  Atrial Fibrillation  Follow up  Allie Baer, on 04/07/2022  ????left message ? spoke to Iman WHEELER [place] [facility] will follow patient on discharge. Phone: [phone]  [employer]  No Qualifying Data

## 2022-07-11 ENCOUNTER — DOCUMENT SCAN (OUTPATIENT)
Dept: HOME HEALTH SERVICES | Facility: HOSPITAL | Age: 87
End: 2022-07-11
Payer: MEDICARE

## 2022-07-18 ENCOUNTER — TELEPHONE (OUTPATIENT)
Dept: INTERNAL MEDICINE | Facility: CLINIC | Age: 87
End: 2022-07-18
Payer: MEDICARE

## 2022-07-18 NOTE — TELEPHONE ENCOUNTER
----- Message from Negra Elmore LPN sent at 7/18/2022 10:30 AM CDT -----  Regarding: FW: Covid  Contact: [phone]    ----- Message -----  From: Karin Moore  Sent: 7/18/2022   8:39 AM CDT  To: Negra Elmore LPN  Subject: Covid                                            Pt Tested positive for Covid.  Please call.

## 2022-07-26 ENCOUNTER — DOCUMENT SCAN (OUTPATIENT)
Dept: HOME HEALTH SERVICES | Facility: HOSPITAL | Age: 87
End: 2022-07-26
Payer: MEDICARE

## 2022-07-28 ENCOUNTER — DOCUMENT SCAN (OUTPATIENT)
Dept: HOME HEALTH SERVICES | Facility: HOSPITAL | Age: 87
End: 2022-07-28
Payer: MEDICARE

## 2022-08-01 DIAGNOSIS — F32.A DEPRESSION, UNSPECIFIED DEPRESSION TYPE: Primary | ICD-10-CM

## 2022-08-01 PROBLEM — J43.2 CENTRILOBULAR EMPHYSEMA: Status: ACTIVE | Noted: 2022-08-01

## 2022-08-01 PROBLEM — C34.31 PRIMARY CANCER OF RIGHT LOWER LOBE OF LUNG: Status: ACTIVE | Noted: 2022-08-01

## 2022-08-01 RX ORDER — MIRTAZAPINE 15 MG/1
15 TABLET, FILM COATED ORAL NIGHTLY
Qty: 90 TABLET | Refills: 3 | Status: ON HOLD | OUTPATIENT
Start: 2022-08-01 | End: 2022-12-13 | Stop reason: HOSPADM

## 2022-08-01 RX ORDER — MIRTAZAPINE 15 MG/1
15 TABLET, FILM COATED ORAL NIGHTLY
COMMUNITY
Start: 2022-07-11 | End: 2022-08-01 | Stop reason: SDUPTHER

## 2022-08-08 ENCOUNTER — OFFICE VISIT (OUTPATIENT)
Dept: INTERNAL MEDICINE | Facility: CLINIC | Age: 87
End: 2022-08-08
Payer: MEDICARE

## 2022-08-08 VITALS
HEART RATE: 81 BPM | SYSTOLIC BLOOD PRESSURE: 132 MMHG | BODY MASS INDEX: 25.03 KG/M2 | HEIGHT: 62 IN | WEIGHT: 136 LBS | DIASTOLIC BLOOD PRESSURE: 68 MMHG | OXYGEN SATURATION: 96 %

## 2022-08-08 DIAGNOSIS — G62.9 NEUROPATHY: ICD-10-CM

## 2022-08-08 DIAGNOSIS — I26.99 PULMONARY EMBOLISM, UNSPECIFIED CHRONICITY, UNSPECIFIED PULMONARY EMBOLISM TYPE, UNSPECIFIED WHETHER ACUTE COR PULMONALE PRESENT: ICD-10-CM

## 2022-08-08 DIAGNOSIS — C34.31 SQUAMOUS CELL CARCINOMA OF BRONCHUS IN RIGHT LOWER LOBE: Primary | ICD-10-CM

## 2022-08-08 DIAGNOSIS — E78.5 HYPERLIPIDEMIA, UNSPECIFIED HYPERLIPIDEMIA TYPE: ICD-10-CM

## 2022-08-08 DIAGNOSIS — J44.9 CHRONIC OBSTRUCTIVE PULMONARY DISEASE, UNSPECIFIED COPD TYPE: ICD-10-CM

## 2022-08-08 DIAGNOSIS — C34.31 PRIMARY CANCER OF RIGHT LOWER LOBE OF LUNG: ICD-10-CM

## 2022-08-08 DIAGNOSIS — I10 HYPERTENSION, UNSPECIFIED TYPE: ICD-10-CM

## 2022-08-08 DIAGNOSIS — I48.91 ATRIAL FIBRILLATION, UNSPECIFIED TYPE: ICD-10-CM

## 2022-08-08 PROCEDURE — 99214 PR OFFICE/OUTPT VISIT, EST, LEVL IV, 30-39 MIN: ICD-10-PCS | Mod: ,,, | Performed by: STUDENT IN AN ORGANIZED HEALTH CARE EDUCATION/TRAINING PROGRAM

## 2022-08-08 PROCEDURE — 99214 OFFICE O/P EST MOD 30 MIN: CPT | Mod: ,,, | Performed by: STUDENT IN AN ORGANIZED HEALTH CARE EDUCATION/TRAINING PROGRAM

## 2022-08-08 RX ORDER — BLOOD SUGAR DIAGNOSTIC
STRIP MISCELLANEOUS
Status: ON HOLD | COMMUNITY
Start: 2022-07-27 | End: 2022-12-13 | Stop reason: HOSPADM

## 2022-08-08 RX ORDER — PRAMIPEXOLE DIHYDROCHLORIDE 0.75 MG/1
0.75 TABLET ORAL
COMMUNITY
Start: 2022-01-24 | End: 2022-11-17 | Stop reason: SDUPTHER

## 2022-08-08 RX ORDER — CYANOCOBALAMIN 1000 UG/ML
INJECTION, SOLUTION INTRAMUSCULAR; SUBCUTANEOUS
Status: ON HOLD | COMMUNITY
Start: 2022-06-28 | End: 2023-01-02 | Stop reason: HOSPADM

## 2022-08-08 RX ORDER — ACETAMINOPHEN 325 MG/1
325 TABLET ORAL
COMMUNITY
Start: 2021-12-21 | End: 2022-11-17 | Stop reason: SDUPTHER

## 2022-08-08 RX ORDER — HYDROXYZINE PAMOATE 25 MG/1
25 CAPSULE ORAL NIGHTLY PRN
Qty: 30 CAPSULE | Refills: 1 | Status: SHIPPED | OUTPATIENT
Start: 2022-08-08 | End: 2022-08-08 | Stop reason: SDUPTHER

## 2022-08-08 RX ORDER — METOPROLOL TARTRATE 25 MG/1
12.5 TABLET, FILM COATED ORAL
Status: ON HOLD | COMMUNITY
Start: 2022-01-12 | End: 2023-01-02 | Stop reason: HOSPADM

## 2022-08-08 RX ORDER — HYDROXYZINE PAMOATE 25 MG/1
25 CAPSULE ORAL NIGHTLY PRN
Qty: 30 CAPSULE | Refills: 1 | Status: ON HOLD | OUTPATIENT
Start: 2022-08-08 | End: 2022-11-20 | Stop reason: HOSPADM

## 2022-08-08 NOTE — PROGRESS NOTES
Subjective:      Judith Ramirez  08/08/2022  [id number]    Allie Baer MD  Patient Care Team:  Allie Etienne MD as PCP - General (Internal Medicine)          Visit Type:a scheduled routine follow-up visit    Chief Complaint: Follow-up    HPI   Ms Wong presents for 6 months follow up. Patient is doing well. Patient is complaining of occasional numbness and tingling of upper extremities, has past medical history of cervical spine arthritis with occasional neck pain . Patient is also complaining of pruritus.     Past Medical History:   Diagnosis Date    COPD (chronic obstructive pulmonary disease)     GERD (gastroesophageal reflux disease)     Hyperlipidemia     Hypertension      Past Surgical History:   Procedure Laterality Date    CHOLECYSTECTOMY      EYE SURGERY      gum grafting      rotator cuff right      thyroid goiter      total kne left      trigger finger thumb right       History reviewed. No pertinent family history.  Social History     Tobacco Use    Smoking status: Former Smoker     Types: Cigarettes    Smokeless tobacco: Never Used    Tobacco comment: quit 29 yrs ago   Substance and Sexual Activity    Alcohol use: Not on file    Drug use: Never    Sexual activity: Not on file     Active Problem List with Overview Notes    Diagnosis Date Noted    Hypertension 08/08/2022    Hyperlipidemia 08/08/2022    Neuropathy 08/08/2022    Pulmonary embolism 08/08/2022    Atrial fibrillation 08/08/2022    Chronic obstructive pulmonary disease 08/08/2022    Primary cancer of right lower lobe of lung 08/01/2022    Centrilobular emphysema 08/01/2022     Review of patient's allergies indicates:   Allergen Reactions    Aspirin Hives    Aspirin-acetaminophen     Atropine-demerol     Clindamycin Hives    Clindamycin-jagruti per-hyalur na     Darvon compound 32 [propoxyphene-asa-caffeine]     Meperidine Hives    Penicillin Hives    Penicillins     Pentazocine lactate Hives     Propoxyphene Hives    Talwin compound     Tramadol Hives    Codeine Nausea And Vomiting       The following were reviewed at this visit: active problem list, medication list, allergies, family history, social history, and health maintenance.    Medications:    Current Outpatient Medications:     acetaminophen (TYLENOL) 325 MG tablet, Take 325 mg by mouth., Disp: , Rfl:     albuterol (PROVENTIL/VENTOLIN HFA) 90 mcg/actuation inhaler, INHALE 2 PUFFS EVERY 6 HOURS, Disp: 120 g, Rfl: 4    apixaban (ELIQUIS) 2.5 mg Tab, Take 1 tablet (2.5 mg total) by mouth 2 (two) times daily., Disp: 60 tablet, Rfl: 11    baclofen (LIORESAL) 5 mg Tab tablet, , Disp: , Rfl:     CONTOUR NEXT TEST STRIPS Strp, USE 1 STRIP TO CHECK GLUCOSE ONCE DAILY, Disp: , Rfl:     cyanocobalamin 1,000 mcg/mL injection, , Disp: , Rfl:     digoxin (LANOXIN) 125 mcg tablet, , Disp: , Rfl:     famotidine (PEPCID) 40 MG tablet, , Disp: , Rfl:     furosemide (LASIX) 40 MG tablet, TAKE 1 TABLET BY MOUTH TWICE DAILY FOR 3 DAYS THEN ONCE DAILY THEREAFTER, Disp: , Rfl:     melatonin (MELATIN), Take 1 mg by mouth., Disp: , Rfl:     metoprolol tartrate (LOPRESSOR) 25 MG tablet, Take 12.5 mg by mouth., Disp: , Rfl:     mirtazapine (REMERON) 15 MG tablet, Take 1 tablet (15 mg total) by mouth nightly., Disp: 90 tablet, Rfl: 3    omeprazole (PRILOSEC) 20 MG capsule, Take 20 mg by mouth., Disp: , Rfl:     polyethylene glycol (GLYCOLAX) 17 gram PwPk, Take by mouth., Disp: , Rfl:     potassium chloride SA (K-DUR,KLOR-CON) 20 MEQ tablet, Take 20 mEq by mouth once daily., Disp: , Rfl:     pramipexole (MIRAPEX) 0.75 MG tablet, Take 0.75 mg by mouth., Disp: , Rfl:     pravastatin (PRAVACHOL) 40 MG tablet, Take 40 mg by mouth nightly., Disp: , Rfl:     STIOLTO RESPIMAT 2.5-2.5 mcg/actuation Mist, , Disp: , Rfl:     traZODone (DESYREL) 100 MG tablet, , Disp: , Rfl:     hydrOXYzine pamoate (VISTARIL) 25 MG Cap, Take 1 capsule (25 mg total) by mouth nightly  "as needed (itching)., Disp: 30 capsule, Rfl: 1      Medications have been reviewed and reconciled with patient at this visit.  Barriers to medications reviewed with patient.    Adverse reactions to current medications reviewed with patient..    Over the counter medications reviewed and reconciled with patient.  Review of Systems   Constitutional: Negative for chills, fever, malaise/fatigue and weight loss.   HENT: Negative for congestion, ear discharge, ear pain, hearing loss, sinus pain and sore throat.    Eyes: Negative for photophobia, pain, discharge and redness.   Respiratory: Negative for cough, shortness of breath and wheezing.    Cardiovascular: Negative for chest pain, palpitations and leg swelling.   Gastrointestinal: Negative for constipation, diarrhea, heartburn, nausea and vomiting.   Genitourinary: Negative for dysuria, frequency and urgency.   Musculoskeletal: Negative for falls, joint pain and myalgias.   Skin: Positive for itching. Negative for rash.   Neurological: Negative for dizziness, focal weakness, weakness and headaches.   Psychiatric/Behavioral: Negative for depression and memory loss. The patient is not nervous/anxious and does not have insomnia.            Objective:      Vitals:    08/08/22 0826   BP: 132/68   BP Location: Left arm   Pulse: 81   SpO2: 96%   Weight: 61.7 kg (136 lb)   Height: 5' 2" (1.575 m)       Physical Exam  Constitutional:       General: She is not in acute distress.     Appearance: Normal appearance.   HENT:      Head: Normocephalic and atraumatic.   Eyes:      Extraocular Movements: Extraocular movements intact.      Pupils: Pupils are equal, round, and reactive to light.   Cardiovascular:      Rate and Rhythm: Normal rate and regular rhythm.      Pulses: Normal pulses.      Heart sounds: Normal heart sounds. No murmur heard.    No friction rub. No gallop.   Pulmonary:      Effort: Pulmonary effort is normal.      Breath sounds: Normal breath sounds. No wheezing, " rhonchi or rales.   Abdominal:      General: Abdomen is flat. Bowel sounds are normal. There is no distension.      Palpations: Abdomen is soft.      Tenderness: There is no abdominal tenderness.   Musculoskeletal:         General: No swelling or tenderness. Normal range of motion.      Cervical back: Normal range of motion and neck supple. No tenderness.      Right lower leg: No edema.      Left lower leg: No edema.   Lymphadenopathy:      Cervical: No cervical adenopathy.   Skin:     Findings: No lesion or rash.   Neurological:      General: No focal deficit present.      Mental Status: She is alert and oriented to person, place, and time.      Cranial Nerves: No cranial nerve deficit.      Sensory: No sensory deficit.      Motor: No weakness.   Psychiatric:         Mood and Affect: Mood normal.         Behavior: Behavior normal.         Thought Content: Thought content normal.           Laboratory Reviewed ({Yes)  Lab Results   Component Value Date    WBC 7.8 03/29/2022    HGB 13.1 03/29/2022    HCT 42.6 03/29/2022     03/29/2022    CHOL 159.0 12/21/2020    TRIG 121.0 12/21/2020    HDL 78.0 (H) 12/21/2020    ALT 18 03/29/2022    AST 15 03/29/2022     03/29/2022    K 3.8 03/29/2022    CREATININE 0.90 03/29/2022    BUN 24.5 03/29/2022    CO2 27 03/29/2022    TSH 1.9170 03/16/2022    INR 1.04 03/23/2022    HGBA1C 6.2 12/22/2021         Assessment and Plan:       Problem List Items Addressed This Visit        Neuro    Neuropathy     Involving upper extremities  Likely resulting from C spine arthritis               Pulmonary    Chronic obstructive pulmonary disease     Follows with Pulmonology  Continue current medications              Cardiac/Vascular    Hypertension     Controlled  Continue current medications           Hyperlipidemia     Continue pravastatin            Atrial fibrillation     Continue digoxin and metoprolol  Continue Eliquis              Hematology    Pulmonary embolism     Continue  Eliquis               Oncology    Primary cancer of right lower lobe of lung     Follows with Oncology                 Pruritus. Will prescribe Vistaril 25 mg nightly as needed for pruritus    Care Plan/Goals: Reviewed    Goals    None         Follow up: Follow up in about 7 months (around 2/22/2023) for wellness, Labs check.    No orders of the defined types were placed in this encounter.

## 2022-08-18 ENCOUNTER — HOSPITAL ENCOUNTER (OUTPATIENT)
Dept: RADIOLOGY | Facility: HOSPITAL | Age: 87
Discharge: HOME | End: 2022-08-18
Attending: RADIOLOGY
Payer: MEDICARE

## 2022-08-18 DIAGNOSIS — C34.31 SQUAMOUS CELL CARCINOMA OF BRONCHUS IN RIGHT LOWER LOBE: ICD-10-CM

## 2022-08-18 PROCEDURE — 78815 PET IMAGE W/CT SKULL-THIGH: CPT | Mod: TC

## 2022-08-19 LAB — POCT GLUCOSE: 87 MG/DL (ref 70–110)

## 2022-09-13 ENCOUNTER — PROCEDURE VISIT (OUTPATIENT)
Dept: RESPIRATORY THERAPY | Facility: HOSPITAL | Age: 87
End: 2022-09-13
Attending: INTERNAL MEDICINE
Payer: MEDICARE

## 2022-09-13 DIAGNOSIS — J44.9 CHRONIC OBSTRUCTIVE PULMONARY DISEASE, UNSPECIFIED COPD TYPE: ICD-10-CM

## 2022-09-13 LAB
CORRECTED TEMPERATURE (PCO2): 37 MMHG (ref 35–45)
CORRECTED TEMPERATURE (PH): 7.45 (ref 7.35–7.45)
CORRECTED TEMPERATURE (PO2): 99 MMHG (ref 80–100)
HCO3 UR-SCNC: 25.7 MMOL/L (ref 22–26)
HGB BLD-MCNC: 10 G/DL (ref 12–16)
PCO2 BLDA: 37 MMHG (ref 35–45)
PH SMN: 7.45 [PH] (ref 7.35–7.45)
PO2 BLDA: 99 MMHG (ref 80–100)
POC BASE DEFICIT: 1.7 MMOL/L (ref -2–2)
POC COHB: 0.6 %
POC IONIZED CALCIUM: 1.15 MMOL/L (ref 1.12–1.23)
POC METHB: 0.5 % (ref 0.4–1.5)
POC O2HB: 95.9 % (ref 94–97)
POC SATURATED O2: 98 %
POC TEMPERATURE: 37 °C
POTASSIUM BLD-SCNC: 4.4 MMOL/L (ref 3.5–5)
SODIUM BLD-SCNC: 138 MMOL/L (ref 137–145)
SPECIMEN SOURCE: ABNORMAL

## 2022-09-13 PROCEDURE — 99900035 HC TECH TIME PER 15 MIN (STAT)

## 2022-09-13 PROCEDURE — 82803 BLOOD GASES ANY COMBINATION: CPT

## 2022-09-13 PROCEDURE — 36600 WITHDRAWAL OF ARTERIAL BLOOD: CPT

## 2022-09-21 ENCOUNTER — HISTORICAL (OUTPATIENT)
Dept: ADMINISTRATIVE | Facility: HOSPITAL | Age: 87
End: 2022-09-21
Payer: MEDICARE

## 2022-11-14 PROBLEM — I26.99 PULMONARY EMBOLISM: Status: RESOLVED | Noted: 2022-08-08 | Resolved: 2022-11-14

## 2022-11-17 ENCOUNTER — HOSPITAL ENCOUNTER (INPATIENT)
Facility: HOSPITAL | Age: 87
LOS: 2 days | Discharge: HOME | DRG: 394 | End: 2022-11-20
Attending: EMERGENCY MEDICINE | Admitting: STUDENT IN AN ORGANIZED HEALTH CARE EDUCATION/TRAINING PROGRAM
Payer: MEDICARE

## 2022-11-17 ENCOUNTER — OFFICE VISIT (OUTPATIENT)
Dept: URGENT CARE | Facility: CLINIC | Age: 87
DRG: 394 | End: 2022-11-17
Payer: MEDICARE

## 2022-11-17 VITALS
BODY MASS INDEX: 25.84 KG/M2 | HEIGHT: 60 IN | SYSTOLIC BLOOD PRESSURE: 186 MMHG | WEIGHT: 131.63 LBS | TEMPERATURE: 99 F | RESPIRATION RATE: 19 BRPM | DIASTOLIC BLOOD PRESSURE: 82 MMHG | HEART RATE: 64 BPM | OXYGEN SATURATION: 94 %

## 2022-11-17 DIAGNOSIS — R13.10 DYSPHAGIA: Primary | ICD-10-CM

## 2022-11-17 DIAGNOSIS — R07.9 CHEST PAIN, UNSPECIFIED TYPE: ICD-10-CM

## 2022-11-17 DIAGNOSIS — R13.10 DYSPHAGIA, UNSPECIFIED TYPE: Primary | ICD-10-CM

## 2022-11-17 DIAGNOSIS — K56.699 FOOD BOLUS OBSTRUCTION OF INTESTINE: ICD-10-CM

## 2022-11-17 DIAGNOSIS — R11.10 VOMITING, UNSPECIFIED VOMITING TYPE, UNSPECIFIED WHETHER NAUSEA PRESENT: ICD-10-CM

## 2022-11-17 DIAGNOSIS — R09.A2 GLOBUS SENSATION: ICD-10-CM
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
## 2022-11-17 DIAGNOSIS — R07.9 CHEST PAIN: ICD-10-CM

## 2022-11-17 PROBLEM — R11.0 NAUSEA: Status: ACTIVE | Noted: 2022-11-17

## 2022-11-17 PROBLEM — J43.9 PULMONARY EMPHYSEMA: Status: ACTIVE | Noted: 2022-08-08

## 2022-11-17 PROBLEM — F41.1 ANXIETY STATE: Status: ACTIVE | Noted: 2022-11-17

## 2022-11-17 PROBLEM — S92.353A CLOSED FRACTURE OF BASE OF FIFTH METATARSAL BONE: Status: ACTIVE | Noted: 2022-11-17

## 2022-11-17 PROBLEM — K57.90 DIVERTICULOSIS: Status: ACTIVE | Noted: 2022-11-17

## 2022-11-17 PROBLEM — K31.84 GASTROPARESIS: Status: ACTIVE | Noted: 2022-11-17

## 2022-11-17 PROBLEM — E78.00 HYPERCHOLESTEROLEMIA: Status: ACTIVE | Noted: 2022-08-08

## 2022-11-17 PROBLEM — Z87.891 FORMER SMOKER: Status: ACTIVE | Noted: 2022-11-17

## 2022-11-17 PROBLEM — I45.9 CARDIAC CONDUCTION DISORDER: Status: ACTIVE | Noted: 2022-11-17

## 2022-11-17 PROBLEM — G25.81 RESTLESS LEGS SYNDROME: Status: ACTIVE | Noted: 2022-11-17

## 2022-11-17 PROBLEM — R91.8 LUNG MASS: Status: ACTIVE | Noted: 2022-11-17

## 2022-11-17 PROBLEM — I47.10 SUPRAVENTRICULAR TACHYCARDIA: Status: ACTIVE | Noted: 2022-08-08

## 2022-11-17 PROBLEM — K63.5 POLYP OF COLON: Status: ACTIVE | Noted: 2022-11-17

## 2022-11-17 PROBLEM — C34.90 ADENOCARCINOMA OF LUNG: Status: ACTIVE | Noted: 2022-11-17

## 2022-11-17 PROBLEM — N39.0 URINARY TRACT INFECTION: Status: ACTIVE | Noted: 2020-11-02

## 2022-11-17 PROBLEM — K21.9 GASTROESOPHAGEAL REFLUX DISEASE: Status: ACTIVE | Noted: 2022-11-17

## 2022-11-17 PROBLEM — Z87.39 HISTORY OF OSTEOPOROSIS: Status: ACTIVE | Noted: 2022-11-17

## 2022-11-17 PROBLEM — M81.0 AGE RELATED OSTEOPOROSIS: Status: ACTIVE | Noted: 2022-11-17

## 2022-11-17 LAB
ALBUMIN SERPL-MCNC: 3.4 GM/DL (ref 3.4–4.8)
ALBUMIN/GLOB SERPL: 0.8 RATIO (ref 1.1–2)
ALP SERPL-CCNC: 110 UNIT/L (ref 40–150)
ALT SERPL-CCNC: 7 UNIT/L (ref 0–55)
AST SERPL-CCNC: 13 UNIT/L (ref 5–34)
BASOPHILS # BLD AUTO: 0.05 X10(3)/MCL (ref 0–0.2)
BASOPHILS NFR BLD AUTO: 0.7 %
BILIRUBIN DIRECT+TOT PNL SERPL-MCNC: 0.4 MG/DL
BNP BLD-MCNC: 211.5 PG/ML
BUN SERPL-MCNC: 17.1 MG/DL (ref 9.8–20.1)
CALCIUM SERPL-MCNC: 10 MG/DL (ref 8.4–10.2)
CHLORIDE SERPL-SCNC: 108 MMOL/L (ref 98–107)
CO2 SERPL-SCNC: 25 MMOL/L (ref 23–31)
CREAT SERPL-MCNC: 0.93 MG/DL (ref 0.55–1.02)
EOSINOPHIL # BLD AUTO: 0.01 X10(3)/MCL (ref 0–0.9)
EOSINOPHIL NFR BLD AUTO: 0.1 %
ERYTHROCYTE [DISTWIDTH] IN BLOOD BY AUTOMATED COUNT: 20.3 % (ref 11.5–17)
FERRITIN SERPL-MCNC: 19.85 NG/ML (ref 4.63–204)
GFR SERPLBLD CREATININE-BSD FMLA CKD-EPI: 59 MLS/MIN/1.73/M2
GLOBULIN SER-MCNC: 4.4 GM/DL (ref 2.4–3.5)
GLUCOSE SERPL-MCNC: 159 MG/DL (ref 82–115)
HCT VFR BLD AUTO: 36.9 % (ref 37–47)
HGB BLD-MCNC: 11.5 GM/DL (ref 12–16)
IMM GRANULOCYTES # BLD AUTO: 0.01 X10(3)/MCL (ref 0–0.04)
IMM GRANULOCYTES NFR BLD AUTO: 0.1 %
IRON SATN MFR SERPL: 6 % (ref 20–50)
IRON SERPL-MCNC: 28 UG/DL (ref 50–170)
LYMPHOCYTES # BLD AUTO: 1.11 X10(3)/MCL (ref 0.6–4.6)
LYMPHOCYTES NFR BLD AUTO: 15.9 %
MCH RBC QN AUTO: 24.3 PG (ref 27–31)
MCHC RBC AUTO-ENTMCNC: 31.2 MG/DL (ref 33–36)
MCV RBC AUTO: 77.8 FL (ref 80–94)
MONOCYTES # BLD AUTO: 0.41 X10(3)/MCL (ref 0.1–1.3)
MONOCYTES NFR BLD AUTO: 5.9 %
NEUTROPHILS # BLD AUTO: 5.4 X10(3)/MCL (ref 2.1–9.2)
NEUTROPHILS NFR BLD AUTO: 77.3 %
NRBC BLD AUTO-RTO: 0 %
PLATELET # BLD AUTO: 321 X10(3)/MCL (ref 130–400)
PMV BLD AUTO: 9.2 FL (ref 7.4–10.4)
POTASSIUM SERPL-SCNC: 4.4 MMOL/L (ref 3.5–5.1)
PROT SERPL-MCNC: 7.8 GM/DL (ref 5.8–7.6)
RBC # BLD AUTO: 4.74 X10(6)/MCL (ref 4.2–5.4)
SODIUM SERPL-SCNC: 145 MMOL/L (ref 136–145)
TIBC SERPL-MCNC: 412 UG/DL (ref 70–310)
TIBC SERPL-MCNC: 440 UG/DL (ref 250–450)
TRANSFERRIN SERPL-MCNC: 408 MG/DL
TROPONIN I SERPL-MCNC: <0.01 NG/ML (ref 0–0.04)
TROPONIN I SERPL-MCNC: <0.01 NG/ML (ref 0–0.04)
WBC # SPEC AUTO: 7 X10(3)/MCL (ref 4.5–11.5)

## 2022-11-17 PROCEDURE — 93005 ELECTROCARDIOGRAM TRACING: CPT

## 2022-11-17 PROCEDURE — 85025 COMPLETE CBC W/AUTO DIFF WBC: CPT | Performed by: EMERGENCY MEDICINE

## 2022-11-17 PROCEDURE — 96375 TX/PRO/DX INJ NEW DRUG ADDON: CPT

## 2022-11-17 PROCEDURE — 63600175 PHARM REV CODE 636 W HCPCS

## 2022-11-17 PROCEDURE — 63600175 PHARM REV CODE 636 W HCPCS: Performed by: EMERGENCY MEDICINE

## 2022-11-17 PROCEDURE — G0378 HOSPITAL OBSERVATION PER HR: HCPCS

## 2022-11-17 PROCEDURE — 99285 EMERGENCY DEPT VISIT HI MDM: CPT | Mod: 25,27

## 2022-11-17 PROCEDURE — 80162 ASSAY OF DIGOXIN TOTAL: CPT | Performed by: STUDENT IN AN ORGANIZED HEALTH CARE EDUCATION/TRAINING PROGRAM

## 2022-11-17 PROCEDURE — 84484 ASSAY OF TROPONIN QUANT: CPT | Performed by: EMERGENCY MEDICINE

## 2022-11-17 PROCEDURE — 82728 ASSAY OF FERRITIN: CPT

## 2022-11-17 PROCEDURE — 80053 COMPREHEN METABOLIC PANEL: CPT | Performed by: EMERGENCY MEDICINE

## 2022-11-17 PROCEDURE — 25000003 PHARM REV CODE 250

## 2022-11-17 PROCEDURE — 99213 PR OFFICE/OUTPT VISIT, EST, LEVL III, 20-29 MIN: ICD-10-PCS | Mod: S$PBB,,, | Performed by: NURSE PRACTITIONER

## 2022-11-17 PROCEDURE — 96361 HYDRATE IV INFUSION ADD-ON: CPT

## 2022-11-17 PROCEDURE — 96374 THER/PROPH/DIAG INJ IV PUSH: CPT

## 2022-11-17 PROCEDURE — 99213 OFFICE O/P EST LOW 20 MIN: CPT | Mod: S$PBB,,, | Performed by: NURSE PRACTITIONER

## 2022-11-17 PROCEDURE — C9113 INJ PANTOPRAZOLE SODIUM, VIA: HCPCS | Performed by: EMERGENCY MEDICINE

## 2022-11-17 PROCEDURE — 36415 COLL VENOUS BLD VENIPUNCTURE: CPT | Performed by: STUDENT IN AN ORGANIZED HEALTH CARE EDUCATION/TRAINING PROGRAM

## 2022-11-17 PROCEDURE — 83540 ASSAY OF IRON: CPT

## 2022-11-17 PROCEDURE — 99215 OFFICE O/P EST HI 40 MIN: CPT | Mod: PBBFAC,25 | Performed by: NURSE PRACTITIONER

## 2022-11-17 PROCEDURE — 83880 ASSAY OF NATRIURETIC PEPTIDE: CPT | Performed by: EMERGENCY MEDICINE

## 2022-11-17 RX ORDER — IPRATROPIUM BROMIDE AND ALBUTEROL SULFATE 2.5; .5 MG/3ML; MG/3ML
3 SOLUTION RESPIRATORY (INHALATION) EVERY 4 HOURS PRN
Status: DISCONTINUED | OUTPATIENT
Start: 2022-11-17 | End: 2022-11-20 | Stop reason: HOSPADM

## 2022-11-17 RX ORDER — FUROSEMIDE 20 MG/1
40 TABLET ORAL DAILY
Status: DISCONTINUED | OUTPATIENT
Start: 2022-11-18 | End: 2022-11-17

## 2022-11-17 RX ORDER — PRAMIPEXOLE DIHYDROCHLORIDE 0.25 MG/1
0.75 TABLET ORAL
Status: ON HOLD | COMMUNITY
Start: 2022-04-01 | End: 2022-12-13 | Stop reason: HOSPADM

## 2022-11-17 RX ORDER — CLOPIDOGREL BISULFATE 75 MG/1
75 TABLET ORAL DAILY
Status: DISCONTINUED | OUTPATIENT
Start: 2022-11-18 | End: 2022-11-17

## 2022-11-17 RX ORDER — FUROSEMIDE 20 MG/1
40 TABLET ORAL
COMMUNITY
Start: 2022-04-01 | End: 2022-11-21 | Stop reason: SDUPTHER

## 2022-11-17 RX ORDER — ATORVASTATIN CALCIUM 10 MG/1
10 TABLET, FILM COATED ORAL NIGHTLY
Status: DISCONTINUED | OUTPATIENT
Start: 2022-11-17 | End: 2022-11-20 | Stop reason: HOSPADM

## 2022-11-17 RX ORDER — DIGOXIN 125 MCG
0.12 TABLET ORAL DAILY
Status: DISCONTINUED | OUTPATIENT
Start: 2022-11-18 | End: 2022-11-20 | Stop reason: HOSPADM

## 2022-11-17 RX ORDER — METOPROLOL TARTRATE 25 MG/1
12.5 TABLET ORAL 2 TIMES DAILY
Status: DISCONTINUED | OUTPATIENT
Start: 2022-11-17 | End: 2022-11-20 | Stop reason: HOSPADM

## 2022-11-17 RX ORDER — ONDANSETRON 2 MG/ML
8 INJECTION INTRAMUSCULAR; INTRAVENOUS EVERY 6 HOURS PRN
Status: DISCONTINUED | OUTPATIENT
Start: 2022-11-17 | End: 2022-11-20 | Stop reason: HOSPADM

## 2022-11-17 RX ORDER — IPRATROPIUM BROMIDE AND ALBUTEROL SULFATE 2.5; .5 MG/3ML; MG/3ML
3 SOLUTION RESPIRATORY (INHALATION)
Status: ON HOLD | COMMUNITY
Start: 2022-04-01 | End: 2023-01-02 | Stop reason: HOSPADM

## 2022-11-17 RX ORDER — ONDANSETRON 2 MG/ML
4 INJECTION INTRAMUSCULAR; INTRAVENOUS
Status: COMPLETED | OUTPATIENT
Start: 2022-11-17 | End: 2022-11-17

## 2022-11-17 RX ORDER — SODIUM CHLORIDE 0.9 % (FLUSH) 0.9 %
10 SYRINGE (ML) INJECTION
Status: DISCONTINUED | OUTPATIENT
Start: 2022-11-17 | End: 2022-11-20 | Stop reason: HOSPADM

## 2022-11-17 RX ORDER — PANTOPRAZOLE SODIUM 40 MG/10ML
40 INJECTION, POWDER, LYOPHILIZED, FOR SOLUTION INTRAVENOUS EVERY 24 HOURS
Status: DISCONTINUED | OUTPATIENT
Start: 2022-11-18 | End: 2022-11-18

## 2022-11-17 RX ORDER — TALC
6 POWDER (GRAM) TOPICAL NIGHTLY PRN
Status: DISCONTINUED | OUTPATIENT
Start: 2022-11-17 | End: 2022-11-20 | Stop reason: HOSPADM

## 2022-11-17 RX ORDER — SODIUM CHLORIDE, SODIUM LACTATE, POTASSIUM CHLORIDE, CALCIUM CHLORIDE 600; 310; 30; 20 MG/100ML; MG/100ML; MG/100ML; MG/100ML
2000 INJECTION, SOLUTION INTRAVENOUS CONTINUOUS
Status: ACTIVE | OUTPATIENT
Start: 2022-11-17 | End: 2022-11-18

## 2022-11-17 RX ORDER — PANTOPRAZOLE SODIUM 40 MG/10ML
40 INJECTION, POWDER, LYOPHILIZED, FOR SOLUTION INTRAVENOUS ONCE
Status: COMPLETED | OUTPATIENT
Start: 2022-11-17 | End: 2022-11-17

## 2022-11-17 RX ORDER — ALBUTEROL SULFATE 90 UG/1
1 AEROSOL, METERED RESPIRATORY (INHALATION) EVERY 4 HOURS PRN
Status: DISCONTINUED | OUTPATIENT
Start: 2022-11-17 | End: 2022-11-18

## 2022-11-17 RX ORDER — ENOXAPARIN SODIUM 100 MG/ML
40 INJECTION SUBCUTANEOUS EVERY 24 HOURS
Status: DISCONTINUED | OUTPATIENT
Start: 2022-11-17 | End: 2022-11-17

## 2022-11-17 RX ORDER — ACETAMINOPHEN 325 MG/1
650 TABLET ORAL
COMMUNITY
Start: 2022-04-01

## 2022-11-17 RX ADMIN — APIXABAN 2.5 MG: 2.5 TABLET, FILM COATED ORAL at 11:11

## 2022-11-17 RX ADMIN — SODIUM CHLORIDE, POTASSIUM CHLORIDE, SODIUM LACTATE AND CALCIUM CHLORIDE 2000 ML: 600; 310; 30; 20 INJECTION, SOLUTION INTRAVENOUS at 09:11

## 2022-11-17 RX ADMIN — PANTOPRAZOLE SODIUM 40 MG: 40 INJECTION, POWDER, FOR SOLUTION INTRAVENOUS at 05:11

## 2022-11-17 RX ADMIN — ATORVASTATIN CALCIUM 10 MG: 10 TABLET, FILM COATED ORAL at 11:11

## 2022-11-17 RX ADMIN — ONDANSETRON 4 MG: 2 INJECTION INTRAMUSCULAR; INTRAVENOUS at 05:11

## 2022-11-17 NOTE — PATIENT INSTRUCTIONS
Transfer to [facility] ED  Report to Nisreen RN  Send for dysphagia, unable to tolerate water or food; vomited all night. Unable to see [facility] until Tuesday.  Many comorbidities.

## 2022-11-17 NOTE — PROGRESS NOTES
Subjective:       Patient ID: Judith Ramirez is a 88 y.o. female.    Vitals:  height is 5' (1.524 m) and weight is 59.7 kg (131 lb 9.6 oz). Her oral temperature is 98.5 °F (36.9 °C). Her blood pressure is 186/82 (abnormal) and her pulse is 64. Her respiration is 19 and oxygen saturation is 94% (abnormal).     Chief Complaint: Gastroesophageal Reflux (Last night)    HPI home meds: on pepcid and prilosec. This patient has a very extensive medication hx including COPD/Emphysema, lung mass, A Fib with SVT, neuropathy, anxiety, HTN, GERD. I am unable to find a past VS trend in EP IC to know if 94% is equal to her baseline, or not.  ROS    Objective:      Physical Exam   Constitutional:      Comments:Appearance likely equal to baseline; On continuous O2   normal  Eyes:      Comments: Watery eyes   Cardiovascular: Normal rate. An irregularly irregular rhythm present. Extrasystoles are present.   No murmur heard.  Abdominal: She exhibits no distension. Soft. Bowel sounds are decreased. flat abdomen There is no abdominal tenderness. There is no guarding and negative Glasgow's sign.      Comments: Specifically, no RUQ or epigastric tenderness with deep palpation   Neurological: She is alert.   Vitals reviewed.      Assessment:       1. Dysphagia, unspecified type    2. Vomiting, unspecified vomiting type, unspecified whether nausea present          Plan:     Transfer to [facility] ED  Report to Nisreen MANE  Send for dysphagia, unable to tolerate water or food; vomited all night. Unable to see [facility] until Tuesday.  Many comorbidities.    Dysphagia, unspecified type  -     Transfer patient    Vomiting, unspecified vomiting type, unspecified whether nausea present  -     Transfer patient

## 2022-11-17 NOTE — ED PROVIDER NOTES
"Encounter Date: 11/17/2022       History     Chief Complaint   Patient presents with    Chest Pain    Shortness of Breath     PT FROM [facility] W REPORT OF DYSPHAGIA.  CO EPIGASTRIC/SUBSTERNAL CP, STATES FEELS LIKE IT IS SMOTHERING HER.  REPORTS DIFFICULTY SWALLOWING FOOD AND WATER SINCE LAST PM W NV.  EKG OBTAINED.      80-year-old female with past history of CHF, hypertension, atrial fibrillation, gastric ulcer disease, past history of mild troponin elevation due to type 2 MI demand ischemia and the presence of COPD exacerbation presents with complaint of nausea.  Triage chief complaint reviewed.  With me, patient actually denies any chest pain or discomfort.  She states she feels a moderate "pressure" in her stomach which is brief and goes away if she belches.  She states she has felt the symptoms often in the past and associates them with her gastritis and reflux.  She states she has had a partial gastrectomy in the past due to stomach ulcers, and has not been able to get her prescription omeprazole in over a month.  She denies dark tarry stools or abdominal pain.  She reports she has had a cardiac angiogram over 5 years ago and had a nuclear medicine stress test about a year ago, both of which she believes were normal.  She has never had a heart attack or stents.    Review of patient's allergies indicates:   Allergen Reactions    Aspirin Hives    Aspirin-acetaminophen     Atropine-demerol     Clindamycin Hives    Clindamycin-jagruti per-hyalur na     Darvon compound 32 [propoxyphene-asa-caffeine]     Meperidine Hives    Penicillin Hives    Penicillins     Pentazocine lactate Hives    Propoxyphene Hives    Talwin compound     Tramadol Hives    Codeine Nausea And Vomiting and Other (See Comments)     Past Medical History:   Diagnosis Date    COPD (chronic obstructive pulmonary disease)     Coronary artery disease     GERD (gastroesophageal reflux disease)     Hyperlipidemia     Hypertension      Past Surgical History: "   Procedure Laterality Date    CHOLECYSTECTOMY      EYE SURGERY      gum grafting      rotator cuff right      thyroid goiter      total kne left      trigger finger thumb right       No family history on file.  Social History     Tobacco Use    Smoking status: Former     Types: Cigarettes    Smokeless tobacco: Never    Tobacco comments:     quit 29 yrs ago   Substance Use Topics    Drug use: Never     Review of Systems   Constitutional:  Negative for chills and fever.   HENT:  Negative for congestion, rhinorrhea and sore throat.    Respiratory:  Negative for chest tightness, shortness of breath and wheezing.    Cardiovascular:  Negative for chest pain, palpitations and leg swelling.   Gastrointestinal:  Negative for abdominal pain, blood in stool, diarrhea and vomiting.   Endocrine: Negative for polyuria.   Genitourinary:  Negative for dysuria and flank pain.   Musculoskeletal:  Negative for myalgias.   Skin:  Negative for rash.   Neurological:  Negative for headaches.   All other systems reviewed and are negative.    Physical Exam     Initial Vitals [11/17/22 1638]   BP Pulse Resp Temp SpO2   119/84 61 18 97.2 °F (36.2 °C) 95 %      MAP       --         Physical Exam    Nursing note and vitals reviewed.  Constitutional: She appears well-developed and well-nourished.   HENT:   Head: Normocephalic and atraumatic.   Eyes: Conjunctivae are normal. Pupils are equal, round, and reactive to light.   Neck: Neck supple.   Normal range of motion.  Cardiovascular:  Normal rate, regular rhythm, normal heart sounds and intact distal pulses.           Pulmonary/Chest: Breath sounds normal.   Abdominal: Abdomen is soft. Bowel sounds are normal. There is no abdominal tenderness.   Musculoskeletal:         General: No tenderness or edema. Normal range of motion.      Cervical back: Normal range of motion and neck supple.      Comments: Bilateral calves are symmetric and appearance with no significant skin abnormality; normal by  palpation with no tenderness or palpable abnormality.     Neurological: She is alert and oriented to person, place, and time.   Skin: Skin is warm and dry.   Psychiatric: She has a normal mood and affect.       ED Course   Procedures  Labs Reviewed   COMPREHENSIVE METABOLIC PANEL - Abnormal; Notable for the following components:       Result Value    Chloride 108 (*)     Glucose Level 159 (*)     Protein Total 7.8 (*)     Globulin 4.4 (*)     Albumin/Globulin Ratio 0.8 (*)     All other components within normal limits   B-TYPE NATRIURETIC PEPTIDE - Abnormal; Notable for the following components:    Natriuretic Peptide 211.5 (*)     All other components within normal limits   CBC WITH DIFFERENTIAL - Abnormal; Notable for the following components:    Hgb 11.5 (*)     Hct 36.9 (*)     MCV 77.8 (*)     MCH 24.3 (*)     MCHC 31.2 (*)     RDW 20.3 (*)     All other components within normal limits   TROPONIN I - Normal   TROPONIN I - Normal   CBC W/ AUTO DIFFERENTIAL    Narrative:     The following orders were created for panel order CBC auto differential.  Procedure                               Abnormality         Status                     ---------                               -----------         ------                     CBC with Differential[[id number]]        Abnormal            Final result                 Please view results for these tests on the individual orders.   EXTRA TUBES    Narrative:     The following orders were created for panel order EXTRA TUBES.  Procedure                               Abnormality         Status                     ---------                               -----------         ------                     Light Blue Top Hold[[id number]]                              In process                 Red Top Hold[[id number]]                                     In process                   Please view results for these tests on the individual orders.   LIGHT BLUE TOP HOLD   RED TOP HOLD     EKG  Readings: (Independently Interpreted)   EKG interpreted by me at 5:00 p.m. reveals atrial fibrillation with normal ventricular rate of 73, right bundle-branch block, possible lateral T-wave abnormality.   ECG Results              EKG 12-lead (Chest Pain) Age >30 (In process)  Result time 11/17/22 16:58:45      In process by Interface, Lab In [facility] (11/17/22 16:58:45)                   Narrative:    Test Reason : R07.9,    Vent. Rate : 073 BPM     Atrial Rate : 044 BPM     P-R Int : 000 ms          QRS Dur : 112 ms      QT Int : 380 ms       P-R-T Axes : 000 068 -63 degrees     QTc Int : 418 ms    Atrial fibrillation  Right bundle branch block  T wave abnormality, consider inferolateral ischemia  Abnormal ECG  No previous ECGs available    Referred By: [email or web address]   SELF           Confirmed By:                                   Imaging Results              X-Ray Chest 1 View (Final result)  Result time 11/17/22 17:59:02      Final result by Rajinder Rose MD (11/17/22 17:59:02)                   Impression:      Chronic changes seen no acute process      Electronically signed by: Rajinder Rose  Date:    11/17/2022  Time:    17:59               Narrative:    EXAMINATION:  XR CHEST 1 VIEW    CLINICAL HISTORY:  Chest pain, unspecified    TECHNIQUE:  Single frontal view of the chest was performed.    COMPARISON:  03/21/2022    FINDINGS:  There are chronic appearing lung changes seen bilaterally. No evidence of acute infiltrate is seen. No mass is seen. No lesion is seen. No pleural effusion is seen. The heart appears normal. The aorta appears normal. The bones and joints show no acute abnormality.                                       Medications   clopidogreL tablet 75 mg (has no administration in time range)   pantoprazole injection 40 mg (40 mg Intravenous Given 11/17/22 1740)   ondansetron injection 4 mg (4 mg Intravenous Given 11/17/22 1740)     Medical Decision Making:   Initial Assessment:    80-year-old female presenting with lower substernal pressure type discomfort and nausea.  Although patient reports prior normal cardiac testing and symptoms feel consistent with prior gastritis/gastric ulcer disease, patient does have inferior lateral ST depression on her EKG, and there is no prior EKG, or medical record to confirm normal prior cardiac testing.  Regardless, patient does report history of partial gastrectomy for gastric ulcer, and is currently taking Xarelto.  Further, her symptoms have not been relieved in the ER, so do recommend admission for trending of troponins, and possible GI consult.  Patient is allergic to aspirin.  Plavix dosing is considered, however held presently due to history of ulcer/gastrectomy, and uncertain etiology of patient's symptoms.           ED Course as of 11/17/22 2018   Thu Nov 17, 2022 2015 I discussed patient's history, presentation, workup, results, impression, and suggested plan of care with hospitalist Dr Valles. They will see the patient in the ED to determine eligibility for admission to the hospital.   [JG]      ED Course User Index  [JG] Harsha Tirado MD                 Clinical Impression:   Final diagnoses:  [R07.9] Chest pain  [R07.9] Chest pain, unspecified type (Primary)        ED Disposition Condition    Admit Stable                Harsha Tirado MD  11/17/22 2018

## 2022-11-18 ENCOUNTER — ANESTHESIA (OUTPATIENT)
Dept: SURGERY | Facility: HOSPITAL | Age: 87
DRG: 394 | End: 2022-11-18
Payer: MEDICARE

## 2022-11-18 ENCOUNTER — ANESTHESIA EVENT (OUTPATIENT)
Dept: SURGERY | Facility: HOSPITAL | Age: 87
DRG: 394 | End: 2022-11-18
Payer: MEDICARE

## 2022-11-18 PROBLEM — K56.699 FOOD BOLUS OBSTRUCTION OF INTESTINE: Status: ACTIVE | Noted: 2022-11-18

## 2022-11-18 PROBLEM — R13.10 DYSPHAGIA: Status: ACTIVE | Noted: 2022-11-18
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
## 2022-11-18 LAB
ALBUMIN SERPL-MCNC: 3 GM/DL (ref 3.4–4.8)
ALBUMIN/GLOB SERPL: 0.8 RATIO (ref 1.1–2)
ALP SERPL-CCNC: 96 UNIT/L (ref 40–150)
ALT SERPL-CCNC: 5 UNIT/L (ref 0–55)
AST SERPL-CCNC: 13 UNIT/L (ref 5–34)
BASOPHILS # BLD AUTO: 0.06 X10(3)/MCL (ref 0–0.2)
BASOPHILS NFR BLD AUTO: 0.8 %
BILIRUBIN DIRECT+TOT PNL SERPL-MCNC: 0.4 MG/DL
BUN SERPL-MCNC: 15 MG/DL (ref 9.8–20.1)
CALCIUM SERPL-MCNC: 9.5 MG/DL (ref 8.4–10.2)
CHLORIDE SERPL-SCNC: 110 MMOL/L (ref 98–107)
CO2 SERPL-SCNC: 26 MMOL/L (ref 23–31)
CREAT SERPL-MCNC: 0.81 MG/DL (ref 0.55–1.02)
DIGOXIN SERPL-MCNC: 0.9 NG/ML (ref 0.8–2)
EOSINOPHIL # BLD AUTO: 0.03 X10(3)/MCL (ref 0–0.9)
EOSINOPHIL NFR BLD AUTO: 0.4 %
ERYTHROCYTE [DISTWIDTH] IN BLOOD BY AUTOMATED COUNT: 20.4 % (ref 11.5–17)
GFR SERPLBLD CREATININE-BSD FMLA CKD-EPI: >60 MLS/MIN/1.73/M2
GLOBULIN SER-MCNC: 3.9 GM/DL (ref 2.4–3.5)
GLUCOSE SERPL-MCNC: 114 MG/DL (ref 82–115)
HCT VFR BLD AUTO: 33.9 % (ref 37–47)
HGB BLD-MCNC: 10.3 GM/DL (ref 12–16)
IMM GRANULOCYTES # BLD AUTO: 0.03 X10(3)/MCL (ref 0–0.04)
IMM GRANULOCYTES NFR BLD AUTO: 0.4 %
LYMPHOCYTES # BLD AUTO: 1.85 X10(3)/MCL (ref 0.6–4.6)
LYMPHOCYTES NFR BLD AUTO: 24.5 %
MAGNESIUM SERPL-MCNC: 2.2 MG/DL (ref 1.6–2.6)
MCH RBC QN AUTO: 24.1 PG (ref 27–31)
MCHC RBC AUTO-ENTMCNC: 30.4 MG/DL (ref 33–36)
MCV RBC AUTO: 79.2 FL (ref 80–94)
MONOCYTES # BLD AUTO: 0.67 X10(3)/MCL (ref 0.1–1.3)
MONOCYTES NFR BLD AUTO: 8.9 %
NEUTROPHILS # BLD AUTO: 4.9 X10(3)/MCL (ref 2.1–9.2)
NEUTROPHILS NFR BLD AUTO: 65 %
NRBC BLD AUTO-RTO: 0 %
PHOSPHATE SERPL-MCNC: 3.8 MG/DL (ref 2.3–4.7)
PLATELET # BLD AUTO: 313 X10(3)/MCL (ref 130–400)
PMV BLD AUTO: 9.6 FL (ref 7.4–10.4)
POTASSIUM SERPL-SCNC: 4.8 MMOL/L (ref 3.5–5.1)
PROT SERPL-MCNC: 6.9 GM/DL (ref 5.8–7.6)
RBC # BLD AUTO: 4.28 X10(6)/MCL (ref 4.2–5.4)
SODIUM SERPL-SCNC: 145 MMOL/L (ref 136–145)
WBC # SPEC AUTO: 7.5 X10(3)/MCL (ref 4.5–11.5)

## 2022-11-18 PROCEDURE — 36415 COLL VENOUS BLD VENIPUNCTURE: CPT

## 2022-11-18 PROCEDURE — 25000003 PHARM REV CODE 250: Performed by: NURSE ANESTHETIST, CERTIFIED REGISTERED

## 2022-11-18 PROCEDURE — 25000003 PHARM REV CODE 250: Performed by: INTERNAL MEDICINE

## 2022-11-18 PROCEDURE — 63600175 PHARM REV CODE 636 W HCPCS: Performed by: NURSE ANESTHETIST, CERTIFIED REGISTERED

## 2022-11-18 PROCEDURE — 37000008 HC ANESTHESIA 1ST 15 MINUTES: Performed by: INTERNAL MEDICINE

## 2022-11-18 PROCEDURE — 27201423 OPTIME MED/SURG SUP & DEVICES STERILE SUPPLY: Performed by: INTERNAL MEDICINE

## 2022-11-18 PROCEDURE — 36000707: Performed by: INTERNAL MEDICINE

## 2022-11-18 PROCEDURE — 80053 COMPREHEN METABOLIC PANEL: CPT

## 2022-11-18 PROCEDURE — 36000706: Performed by: INTERNAL MEDICINE

## 2022-11-18 PROCEDURE — 21400001 HC TELEMETRY ROOM

## 2022-11-18 PROCEDURE — 84100 ASSAY OF PHOSPHORUS: CPT

## 2022-11-18 PROCEDURE — 63600175 PHARM REV CODE 636 W HCPCS

## 2022-11-18 PROCEDURE — 94761 N-INVAS EAR/PLS OXIMETRY MLT: CPT

## 2022-11-18 PROCEDURE — C1773 RET DEV, INSERTABLE: HCPCS | Performed by: INTERNAL MEDICINE

## 2022-11-18 PROCEDURE — 83735 ASSAY OF MAGNESIUM: CPT

## 2022-11-18 PROCEDURE — C9113 INJ PANTOPRAZOLE SODIUM, VIA: HCPCS

## 2022-11-18 PROCEDURE — 37000009 HC ANESTHESIA EA ADD 15 MINS: Performed by: INTERNAL MEDICINE

## 2022-11-18 PROCEDURE — 71000033 HC RECOVERY, INTIAL HOUR: Performed by: INTERNAL MEDICINE

## 2022-11-18 PROCEDURE — 85025 COMPLETE CBC W/AUTO DIFF WBC: CPT

## 2022-11-18 PROCEDURE — 25000003 PHARM REV CODE 250

## 2022-11-18 RX ORDER — DEXAMETHASONE SODIUM PHOSPHATE 4 MG/ML
INJECTION, SOLUTION INTRA-ARTICULAR; INTRALESIONAL; INTRAMUSCULAR; INTRAVENOUS; SOFT TISSUE
Status: DISCONTINUED | OUTPATIENT
Start: 2022-11-18 | End: 2022-11-18

## 2022-11-18 RX ORDER — MORPHINE SULFATE 2 MG/ML
2 INJECTION, SOLUTION INTRAMUSCULAR; INTRAVENOUS EVERY 5 MIN PRN
Status: DISCONTINUED | OUTPATIENT
Start: 2022-11-18 | End: 2022-11-18

## 2022-11-18 RX ORDER — PROPOFOL 10 MG/ML
VIAL (ML) INTRAVENOUS
Status: DISCONTINUED | OUTPATIENT
Start: 2022-11-18 | End: 2022-11-18

## 2022-11-18 RX ORDER — SUCCINYLCHOLINE CHLORIDE 20 MG/ML
INJECTION INTRAMUSCULAR; INTRAVENOUS
Status: DISCONTINUED | OUTPATIENT
Start: 2022-11-18 | End: 2022-11-18

## 2022-11-18 RX ORDER — OXYCODONE HYDROCHLORIDE 5 MG/1
5 TABLET ORAL
Status: DISCONTINUED | OUTPATIENT
Start: 2022-11-18 | End: 2022-11-18

## 2022-11-18 RX ORDER — MEPERIDINE HYDROCHLORIDE 25 MG/ML
12.5 INJECTION INTRAMUSCULAR; INTRAVENOUS; SUBCUTANEOUS EVERY 10 MIN PRN
Status: DISCONTINUED | OUTPATIENT
Start: 2022-11-18 | End: 2022-11-18

## 2022-11-18 RX ORDER — EPHEDRINE SULFATE 50 MG/ML
INJECTION, SOLUTION INTRAVENOUS
Status: DISCONTINUED | OUTPATIENT
Start: 2022-11-18 | End: 2022-11-18

## 2022-11-18 RX ORDER — LABETALOL HCL 20 MG/4 ML
10 SYRINGE (ML) INTRAVENOUS EVERY 4 HOURS PRN
Status: DISCONTINUED | OUTPATIENT
Start: 2022-11-18 | End: 2022-11-20 | Stop reason: HOSPADM

## 2022-11-18 RX ORDER — ONDANSETRON 2 MG/ML
4 INJECTION INTRAMUSCULAR; INTRAVENOUS DAILY PRN
Status: DISCONTINUED | OUTPATIENT
Start: 2022-11-18 | End: 2022-11-18

## 2022-11-18 RX ORDER — HYDRALAZINE HYDROCHLORIDE 20 MG/ML
10 INJECTION INTRAMUSCULAR; INTRAVENOUS EVERY 4 HOURS PRN
Status: DISCONTINUED | OUTPATIENT
Start: 2022-11-18 | End: 2022-11-20 | Stop reason: HOSPADM

## 2022-11-18 RX ORDER — PHENYLEPHRINE HYDROCHLORIDE 10 MG/ML
INJECTION INTRAVENOUS
Status: DISCONTINUED | OUTPATIENT
Start: 2022-11-18 | End: 2022-11-18

## 2022-11-18 RX ORDER — SODIUM CHLORIDE 0.9 % (FLUSH) 0.9 %
10 SYRINGE (ML) INJECTION
Status: DISCONTINUED | OUTPATIENT
Start: 2022-11-18 | End: 2022-11-20 | Stop reason: HOSPADM

## 2022-11-18 RX ORDER — LIDOCAINE HYDROCHLORIDE 20 MG/ML
INJECTION, SOLUTION EPIDURAL; INFILTRATION; INTRACAUDAL; PERINEURAL
Status: DISCONTINUED | OUTPATIENT
Start: 2022-11-18 | End: 2022-11-18

## 2022-11-18 RX ORDER — PANTOPRAZOLE SODIUM 40 MG/1
40 TABLET, DELAYED RELEASE ORAL DAILY
Status: DISCONTINUED | OUTPATIENT
Start: 2022-11-19 | End: 2022-11-20 | Stop reason: HOSPADM

## 2022-11-18 RX ORDER — ROCURONIUM BROMIDE 10 MG/ML
INJECTION, SOLUTION INTRAVENOUS
Status: DISCONTINUED | OUTPATIENT
Start: 2022-11-18 | End: 2022-11-18

## 2022-11-18 RX ADMIN — ROCURONIUM BROMIDE 10 MG: 10 SOLUTION INTRAVENOUS at 01:11

## 2022-11-18 RX ADMIN — PANTOPRAZOLE SODIUM 40 MG: 40 INJECTION, POWDER, LYOPHILIZED, FOR SOLUTION INTRAVENOUS at 09:11

## 2022-11-18 RX ADMIN — SODIUM CHLORIDE, POTASSIUM CHLORIDE, SODIUM LACTATE AND CALCIUM CHLORIDE: 600; 310; 30; 20 INJECTION, SOLUTION INTRAVENOUS at 01:11

## 2022-11-18 RX ADMIN — PHENYLEPHRINE HYDROCHLORIDE 100 MCG: 10 INJECTION INTRAVENOUS at 02:11

## 2022-11-18 RX ADMIN — ONDANSETRON 4 MG: 2 INJECTION INTRAMUSCULAR; INTRAVENOUS at 02:11

## 2022-11-18 RX ADMIN — APIXABAN 2.5 MG: 2.5 TABLET, FILM COATED ORAL at 09:11

## 2022-11-18 RX ADMIN — EPHEDRINE SULFATE 10 MG: 50 INJECTION INTRAVENOUS at 01:11

## 2022-11-18 RX ADMIN — SODIUM CHLORIDE, POTASSIUM CHLORIDE, SODIUM LACTATE AND CALCIUM CHLORIDE 500 ML: 600; 310; 30; 20 INJECTION, SOLUTION INTRAVENOUS at 01:11

## 2022-11-18 RX ADMIN — DIGOXIN 0.12 MG: 125 TABLET ORAL at 09:11

## 2022-11-18 RX ADMIN — EPHEDRINE SULFATE 10 MG: 50 INJECTION INTRAVENOUS at 02:11

## 2022-11-18 RX ADMIN — SODIUM CHLORIDE, POTASSIUM CHLORIDE, SODIUM LACTATE AND CALCIUM CHLORIDE 2000 ML: 600; 310; 30; 20 INJECTION, SOLUTION INTRAVENOUS at 06:11

## 2022-11-18 RX ADMIN — LIDOCAINE HYDROCHLORIDE 50 MG: 20 INJECTION, SOLUTION EPIDURAL; INFILTRATION; INTRACAUDAL; PERINEURAL at 01:11

## 2022-11-18 RX ADMIN — PROPOFOL 120 MG: 10 INJECTION, EMULSION INTRAVENOUS at 01:11

## 2022-11-18 RX ADMIN — ATORVASTATIN CALCIUM 10 MG: 10 TABLET, FILM COATED ORAL at 09:11

## 2022-11-18 RX ADMIN — SUCCINYLCHOLINE CHLORIDE 100 MG: 20 INJECTION, SOLUTION INTRAMUSCULAR; INTRAVENOUS at 01:11

## 2022-11-18 RX ADMIN — DEXAMETHASONE SODIUM PHOSPHATE 8 MG: 4 INJECTION, SOLUTION INTRA-ARTICULAR; INTRALESIONAL; INTRAMUSCULAR; INTRAVENOUS; SOFT TISSUE at 01:11

## 2022-11-18 RX ADMIN — METOPROLOL TARTRATE 12.5 MG: 25 TABLET, FILM COATED ORAL at 09:11

## 2022-11-18 NOTE — H&P
"U Internal Medicine History and Physical     Date of Admit: 11/17/2022    Chief Complaint     Difficulty tolerating PO liquids and solids, vomiting, GERD    Subjective:      History of Present Illness:  Judith Ramirez is a 88 y.o. female who  has a past medical history of COPD (chronic obstructive pulmonary disease), Coronary artery disease, GERD (gastroesophageal reflux disease), Hyperlipidemia, and Hypertension.. The patient presented to [facility] ED on 11/17/2022 with a primary complaint of reflux, inability to tolerate PO liquids/solids, and "spitting up". She states that on 11/16, she was eating shrimp and felt like something got caught in her chest. Since then, she has not been able to tolerate liquids or solids by mouth. She denies odynophagia, but states that when she tries to eat or drink, the undigested material comes back up almost immediately.  She states also that even when she is not trying to eat, she periodically spits up white foam. She states that approximately one month ago she was hospitalized for a COPD exacerbation and after that hospitalization has been off of omeprazole, which she has taken for several years. She states that she has had "stomach troubles all her life" and previously had part of her stomach resected due to gastric ulcers, unable to recall exactly when, but states that it was several years ago. She is an established patient of Dr. Diaz, has an appointment this Tuesday. She denies chest pain, shortness of breath, wheezing, palpitations, swelling in extremities, melena, bleeding per rectum.    Chart review reveals prior manometry in 2/2019 revealed moderate esophageal dysmotility with no strictures, evidence of prior antrectomy, "ineffective esophageal motility disorder", recommended "small doses of baclofen as tolerated".    In the ED she received zofran and protonix, with little to no relief of her symptoms. Labs were remarkable for microcytic anemia. She had negative troponin " x2, CXR was unremarkable, EKG was significant for inferior lateral changes. BNP elevated at 211 but is chronically elevated per chart review. Internal medicine consulted for admission       Past Medical History:  Past Medical History:   Diagnosis Date    COPD (chronic obstructive pulmonary disease)     Coronary artery disease     GERD (gastroesophageal reflux disease)     Hyperlipidemia     Hypertension        Past Surgical History:  Past Surgical History:   Procedure Laterality Date    CHOLECYSTECTOMY      EYE SURGERY      gum grafting      rotator cuff right      thyroid goiter      total kne left      trigger finger thumb right         Allergies:  Review of patient's allergies indicates:   Allergen Reactions    Aspirin Hives    Aspirin-acetaminophen     Atropine-demerol     Clindamycin Hives    Clindamycin-jagruti per-hyalur na     Darvon compound 32 [propoxyphene-asa-caffeine]     Meperidine Hives    Penicillin Hives    Penicillins     Pentazocine lactate Hives    Propoxyphene Hives    Talwin compound     Tramadol Hives    Codeine Nausea And Vomiting and Other (See Comments)       Home Medications:  Prior to Admission medications    Medication Sig Start Date End Date Taking? Authorizing Provider   acetaminophen (TYLENOL) 325 MG tablet Take 650 mg by mouth. 4/1/22   Historical Provider   albuterol (PROVENTIL/VENTOLIN HFA) 90 mcg/actuation inhaler INHALE 2 PUFFS EVERY 6 HOURS 6/13/22   Allie Etienne MD   albuterol-ipratropium (DUO-NEB) 2.5 mg-0.5 mg/3 mL nebulizer solution Inhale 3 mLs into the lungs. 4/1/22   Historical Provider   apixaban (ELIQUIS) 2.5 mg Tab Take 1 tablet (2.5 mg total) by mouth 2 (two) times daily. 8/1/22   Tyson Mcintosh Jr., MD, FCCP   baclofen (LIORESAL) 5 mg Tab tablet  5/28/22   Historical Provider   CONTOUR NEXT TEST STRIPS Strp USE 1 STRIP TO CHECK GLUCOSE ONCE DAILY 7/27/22   Historical Provider   cyanocobalamin 1,000 mcg/mL injection  6/28/22   Historical Provider   digoxin  (LANOXIN) 125 mcg tablet  5/1/22   Historical Provider   famotidine (PEPCID) 40 MG tablet  6/28/22   Historical Provider   furosemide (LASIX) 20 MG tablet Take 40 mg by mouth. 4/1/22   Historical Provider   furosemide (LASIX) 40 MG tablet TAKE 1 TABLET BY MOUTH TWICE DAILY FOR 3 DAYS THEN ONCE DAILY THEREAFTER 4/13/22   Historical Provider   hydrOXYzine pamoate (VISTARIL) 25 MG Cap Take 1 capsule (25 mg total) by mouth nightly as needed (itching). 8/8/22   Allie Etienne MD   melatonin (MELATIN) Take 1 mg by mouth. 1/20/22   Historical Provider   metoprolol tartrate (LOPRESSOR) 25 MG tablet Take 12.5 mg by mouth. 1/12/22   Historical Provider   mirtazapine (REMERON) 15 MG tablet Take 1 tablet (15 mg total) by mouth nightly. 8/1/22   Allie Etienne MD   omeprazole (PRILOSEC) 20 MG capsule Take 20 mg by mouth. 1/26/22   Historical Provider   polyethylene glycol (GLYCOLAX) 17 gram PwPk Take by mouth.    Historical Provider   potassium chloride SA (K-DUR,KLOR-CON) 20 MEQ tablet Take 20 mEq by mouth once daily. 7/11/22   Historical Provider   pramipexole (MIRAPEX) 0.25 MG tablet Take 0.75 mg by mouth. 4/1/22   Historical Provider   pravastatin (PRAVACHOL) 40 MG tablet Take 40 mg by mouth nightly. 4/15/22   Historical Provider   STIOLTO RESPIMAT 2.5-2.5 mcg/actuation Mist  6/28/22   Historical Provider   traZODone (DESYREL) 100 MG tablet  6/28/22   Historical Provider   acetaminophen (TYLENOL) 325 MG tablet Take 325 mg by mouth. 12/21/21 11/17/22  Historical Provider   pramipexole (MIRAPEX) 0.75 MG tablet Take 0.75 mg by mouth. 1/24/22 11/17/22  Historical Provider       Family History:  No family history on file.    Social History:  Social History     Tobacco Use    Smoking status: Former     Types: Cigarettes    Smokeless tobacco: Never    Tobacco comments:     quit 29 yrs ago   Substance Use Topics    Drug use: Never     Long history of smoking, states she quit 29 years ago.    Review of  "Systems:  Constitutional: no fever, fatigue, weakness  Eye: no vision loss, eye redness, drainage, or pain  ENMT: no sore throat, ear pain, sinus pain/congestion, nasal congestion/drainage  Respiratory: no cough, no wheezing, no shortness of breath  Cardiovascular: no chest pain, no palpitations, no edema  Gastrointestinal: +nausea, vomiting undigested food, denies abdominal/epigastric pain  Genitourinary: no dysuria, no urinary frequency or urgency, no hematuria  Hema/Lymph: no abnormal bruising or bleeding  Endocrine: no heat or cold intolerance, no excessive thirst or excessive urination  Musculoskeletal: no muscle or joint pain, no joint swelling  Integumentary: no skin rash or abnormal lesion  Neurologic: no headache, no dizziness, no weakness or numbness         Objective:   Last 24 Hour Vital Signs:  Vitals  BP: 103/89  Temp: 97.2 °F (36.2 °C)  Temp src: Tympanic  Pulse: 78  Resp: 20  SpO2: (!) 90 %  Height: 5' 2" (157.5 cm)  Weight: 59 kg (130 lb 1.1 oz)    Physical Examination:  General: Patient appears nauseous, spitting up white frothy material into bag, no distress, O2 sat appropriate on RA  Eye: pupils equal, EOMI, clear conjunctiva, eyelids normal  HENT: Head-normocephalic and atraumatic  Neck: full range of motion, trachea midline, supple  Respiratory: clear to auscultation bilaterally without wheezes, rales, rhonchi  Cardiovascular: regular rate and rhythm without murmurs.  No gallops or rubs no JVD  Gastrointestinal: soft, non-tender, non-distended with normal bowel sounds, without masses to palpation  Genitourinary: no CVA tenderness to palpation  Musculoskeletal: full range of motion of all extremities/spine without limitation or discomfort  Integumentary: no rashes or skin lesions present  Neurologic: no signs of peripheral neurological deficit, motor/sensory function intact  Psychiatric:  alert and oriented, cognitive function intact, cooperative with exam, good eye " contact        Laboratory:  Most Recent Data:  CMP:  Recent Labs   Lab 11/17/22  1734   CHLORIDE 108*   CO2 25   BUN 17.1   CREATININE 0.93   GLUCOSE 159*   ALBUMIN 3.4   AST 13   ALT 7   ALKPHOS 110     CBC:  Recent Labs   Lab 11/17/22  1734   WBC 7.0   ABSNEUTRO 5.4   RBC 4.74   HGB 11.5*   HCT 36.9*   MCV 77.8*   RDW 20.3*     Coags:   Lab Results   Component Value Date    INR 1.04 03/23/2022    PROTIME 10.9 03/23/2022    PTT 36.0 (H) 12/03/2020     FLP:   Lab Results   Component Value Date    CHOL 159.0 12/21/2020    HDL 78.0 (H) 12/21/2020    TRIG 121.0 12/21/2020     DM:   Lab Results   Component Value Date    HGBA1C 6.2 12/22/2021    HGBA1C 6.1 12/21/2020    HGBA1C 5.9 12/17/2019    CREATININE 0.93 11/17/2022     Thyroid:   Lab Results   Component Value Date    TSH 1.9170 03/16/2022     Anemia:   Lab Results   Component Value Date    FERRITIN 56.81 08/03/2021    EYFSNTGM03 1,174 (H) 07/12/2021     Cardiac:   Lab Results   Component Value Date    TROPONINI <0.010 11/17/2022    .5 (H) 11/17/2022     Urinalysis:   Lab Results   Component Value Date    COLORU Yellow 12/21/2021    NITRITE Negative 12/21/2021    KETONESU Negative 12/21/2021    UROBILINOGEN 1.0 12/21/2021    WBCUA 0-2 12/21/2021       Trended Cardiac Data:  Recent Labs   Lab 11/17/22  1734 11/17/22  1852   TROPONINI <0.010 <0.010   .5*  --          Radiology:  Imaging Results              X-Ray Chest 1 View (Final result)  Result time 11/17/22 17:59:02      Final result by Rajinder Rose MD (11/17/22 17:59:02)                   Impression:      Chronic changes seen no acute process      Electronically signed by: Rajinder Rose  Date:    11/17/2022  Time:    17:59               Narrative:    EXAMINATION:  XR CHEST 1 VIEW    CLINICAL HISTORY:  Chest pain, unspecified    TECHNIQUE:  Single frontal view of the chest was performed.    COMPARISON:  03/21/2022    FINDINGS:  There are chronic appearing lung changes seen  bilaterally. No evidence of acute infiltrate is seen. No mass is seen. No lesion is seen. No pleural effusion is seen. The heart appears normal. The aorta appears normal. The bones and joints show no acute abnormality.                                           Assessment & Plan:     Oropharyngeal dysphagia  GERD  Nausea  - Pt stopped home medication omeprazole 1 month ago for unknown reason  - Has not been able to tolerate PO liquids/solids  -  cc/hr for maintenance fluids  - Clear liquid diet, will advance as tolerated  - IV Protonix 40 mg daily  - IV Zofran 8 mg PRN  - CM consulted for old endoscopy records  - NPO at midnight for possible GI eval in the morning  - SLP ordered, modified barium swallow study    Microcytic anemia  - on admission H/H 11.5/36.9  - per chart review baseline Hgb around 13, patient denies any known source of bleeding  - Iron panel ordered, will follow  - CM consulted for old GI records    COPD  - On home oxygen, 2L   - last hospitalization was earlier this year  - denies recent history of worsening of respiratory symptoms  - Albuterol, Duonebs as needed while inpatient    Paroxysmal atrial fibrillation  - States compliance with all home meds  - Continue Eliquis, digoxin, lopressor    HFpEF  - Last echo 3/2022 showed EF 60%  - Euvolemic on exam, holding home spironolactone    HLD  - on pravastatin daily at home  - Continue atorvastatin 10 mg nightly    Hx of lung adenocarcinoma, s/p external beam radiation  - Follows with outside pulmonologist            CODE STATUS: Full Code  Access: Peripheral  Antibiotics: none  Diet: Clear Liquid  DVT Prophylaxis: Eliquis  GI Prophylaxis: proton pump inhibitor per orders  Fluids: lactated Ringer's 100 ml/hr.     Disposition: day 0 of admission for intractable nausea and inability to tolerate PO. Will admit to medicine     Zaida Valles DO  [facility] Internal Medicine  HO-1

## 2022-11-18 NOTE — PLAN OF CARE
PGY2 ADDENDUM:      Patient was seen in conjunction with intern, agree with assessment and plan on initial IM H&P with included inclusions and addendum. Of note, the patient is a 88 y.o. female with PMH of R lung adenocarcinoma, esophageal dysmotility (manometry 2019), atrial fibrillation (on eliquis), COPD (moderate obstruction PFT 2020), history of PE, HLD She presented to [facility] ED on 11/17/2022 with a primary complaint of nausea and vomiting. Patient reports that she has had symptoms of GERD for several years, and she was previously on Omeprazole but ran out after a recent hospitalization. Patient reports that approximately 1 day prior, had onset of nausea and vomiting after eating shrimp cocktail. States that she is able to swallow solids and liquids, but after a few minutes, has a sensation of food being stuck in her chest, after which she spits up whitish foam like material. Since this episode, she has been unable to tolerate PO intake of solids or liquids. Denies associated fever, chills, headache, shortness of breath, cough, chest pain, abdominal pain, any other complaints. She initially presented to urgent care after which she was sent to ED for further evaluation    In the ED, temp 97.2F. HR 61, RR 18 /84  Labs significant for H/H of 11.5/36.9, chloride 108, .5, troponin <0.01. CXR showed chronic changes with no acute process. Internal Medicine consulted for admission    On physical exam, VSS, sitting up in bed, complaining of nausea, multiple episodes of spitting up while in room. Remainder of physical examination within normal limits  Assessment & Plan:   GERD  Esophageal dysmotility (on manometry 2019)  Nausea 2/2 above  -Has been off PPI for the past month  -will start pantoprazole IV at this time, transition to oral once tolerating PO intake  -Zofran PRN for nausea/vomiting  - cc/hour  -clear liquid diet, advance as tolerated  -on manometry report from 2019 (Dr. Diaz), plan was  for small doses of baclofen as tolerated  For ineffective esophageal motility disorder. Will consult CM in AM to obtain further endoscopy/ GI clinic records  -NPO mn for possible GI eval     Microcytic anemia  -pending iron panel  -continue to monitor H/H    COPD (PFT 2020)  -on home o2 2L  -not currently in acute exacerbation  -prn duonebs     Paroxysmal atrial fibrillation  -continue home medications: eliquis 5 BID, digoxin 0.125 daily, lopressor 12.5 BID  -digoxin level ordered given presentation w/ GI upset; this was WNL    HLD  -continue home statin    History of R lung adenocarcinoma s/p radiation  -continue outpatient follow up with Pulmonology     Letty Almeida MD  Internal Medicine, PGY-2

## 2022-11-18 NOTE — PROGRESS NOTES
"[facility] Medicine Wards   Progress Note     Resident Team: [facility] Medicine List 2  Attending Physician: Elton Sahu MD  Resident: Ralph Kim  Intern: Rosemary Valles Chin   Hospital Length of Stay: 0 days    Subjective:      Brief HPI:  Judith Ramirez is a 88 y.o. female who  has a past medical history of COPD (chronic obstructive pulmonary disease), Coronary artery disease, GERD (gastroesophageal reflux disease), hyperlipidemia, and hypertension, lung adenocarcinoma s/p external beam radiation. The patient presented to [facility] ED on 11/17/2022 with a primary complaint of reflux, inability to tolerate PO liquids/solids, and "spitting up". She states that on 11/16, she was eating shrimp and felt like something got caught in her chest. Since then, she has not been able to tolerate liquids or solids by mouth. She denies odynophagia, but states that when she tries to eat or drink, the undigested material comes back up almost immediately.  She states also that even when she is not trying to eat, she periodically spits up white foam. She states that approximately one month ago she was hospitalized for a COPD exacerbation and after that hospitalization has been off of omeprazole, which she has taken for several years. She states that she has had "stomach troubles all her life" and previously had part of her stomach resected due to gastric ulcers, unable to recall exactly when, but states that it was several years ago. She is an established patient of Dr. Diaz, has an appointment this Tuesday. She denies chest pain, shortness of breath, wheezing, palpitations, swelling in extremities, melena, bleeding per rectum.     Chart review reveals prior manometry in 2/2019 revealed moderate esophageal dysmotility with no strictures, evidence of prior antrectomy, "ineffective esophageal motility disorder", recommended "small doses of baclofen as tolerated".     In the ED she received zofran and protonix, with little to no relief of her " symptoms. Labs were remarkable for microcytic anemia. She had negative troponin x2, CXR was unremarkable, EKG was significant for inferior lateral changes. BNP elevated at 211 but is chronically elevated per chart review. Internal medicine consulted for admission for workup of oropharyngeal dysphagia.    Interval History: this morning, patient feels somewhat better, states that her nausea has improved but is still unable to tolerate PO liquids or solids. Did not wear O2 overnight, this morning O2 sat is in the 80s. Will ensure she wears 2L NC (her baseline) while inpatient.       Review of Systems:  Constitutional: no fever, fatigue, weakness  Eye: no vision loss, eye redness, drainage, or pain  ENMT: no sore throat, ear pain, sinus pain/congestion, nasal congestion/drainage  Respiratory: no cough, no wheezing, no shortness of breath  Cardiovascular: no chest pain, no palpitations, no edema  Gastrointestinal: +nausea, vomiting undigested food, denies abdominal/epigastric pain  Genitourinary: no dysuria, no urinary frequency or urgency, no hematuria  Hema/Lymph: no abnormal bruising or bleeding  Endocrine: no heat or cold intolerance, no excessive thirst or excessive urination  Musculoskeletal: no muscle or joint pain, no joint swelling  Integumentary: no skin rash or abnormal lesion  Neurologic: no headache, no dizziness, no weakness or numbness       Objective:     Vital Signs (Most Recent):  Temp: 98.1 °F (36.7 °C) (11/18/22 0450)  Pulse: 61 (11/18/22 0450)  Resp: 18 (11/17/22 2130)  BP: (!) 147/76 (11/18/22 0450)  SpO2: (!) 92 % (11/18/22 0450)   Vital Signs (24h Range):  Temp:  [97.2 °F (36.2 °C)-98.5 °F (36.9 °C)] 98.1 °F (36.7 °C)  Pulse:  [61-89] 61  Resp:  [15-20] 18  SpO2:  [90 %-97 %] 92 %  BP: ()/(55-89) 147/76       Physical Examination:  General: no distress, O2 sat 85% on RA  Eye: pupils equal, EOMI, clear conjunctiva, eyelids normal  HENT: Head-normocephalic and atraumatic  Neck: full range of  motion, trachea midline, supple  Respiratory: clear to auscultation bilaterally without wheezes, rales, rhonchi  Cardiovascular: regular rate and rhythm without murmurs.  No gallops or rubs no JVD  Gastrointestinal: soft, non-tender, non-distended with normal bowel sounds, without masses to palpation  Genitourinary: no CVA tenderness to palpation  Musculoskeletal: full range of motion of all extremities/spine without limitation or discomfort  Integumentary: no rashes or skin lesions present  Neurologic: no signs of peripheral neurological deficit, motor/sensory function intact  Psychiatric:  alert and oriented, cognitive function intact, cooperative with exam, good eye contact    Laboratory:  Most Recent Data:  Recent Lab Results         11/18/22  0353   11/17/22  2339   11/17/22  1852   11/17/22  1734        Albumin/Globulin Ratio 0.8       0.8       Albumin 3.0       3.4       Alkaline Phosphatase 96       110       ALT 5       7       AST 13       13       Baso # 0.06       0.05       Basophil % 0.8       0.7       BILIRUBIN TOTAL 0.4       0.4       BNP       211.5       BUN 15.0       17.1       Calcium 9.5       10.0       Chloride 110       108       CO2 26       25       Creatinine 0.81       0.93       Digoxin Lvl   0.90           eGFR >60       59       Eos # 0.03       0.01       Eosinophil % 0.4       0.1       Ferritin       19.85       Globulin, Total 3.9       4.4       Glucose 114       159       Hematocrit 33.9       36.9       Hemoglobin 10.3       11.5       Immature Grans (Abs) 0.03       0.01       Immature Granulocytes 0.4       0.1       Iron       28       Iron Binding Capacity Unsaturated       412       Iron Saturation       6       Lymph # 1.85       1.11       LYMPH % 24.5       15.9       Magnesium 2.20             MCH 24.1       24.3       MCHC 30.4       31.2       MCV 79.2       77.8       Mono # 0.67       0.41       Mono % 8.9       5.9       MPV 9.6       9.2       Neut # 4.9        5.4       Neut % 65.0       77.3       nRBC 0.0       0.0       Phosphorus 3.8             Platelets 313       321       Potassium 4.8       4.4       PROTEIN TOTAL 6.9       7.8       RBC 4.28       4.74       RDW 20.4       20.3       Sodium 145       145       TIBC       440       Transferrin       408       Troponin I     <0.010   <0.010       WBC 7.5       7.0                 Microbiology Data Reviewed: yes  Pertinent Findings:  none    Other Results:  EKG (my interpretation): atrial fibrillation, rate 73, possible inferolateral changes noted.    Radiology:  Imaging Results              X-Ray Chest 1 View (Final result)  Result time 11/17/22 17:59:02      Final result by Rajinder Rose MD (11/17/22 17:59:02)                   Impression:      Chronic changes seen no acute process      Electronically signed by: Rajinder Rose  Date:    11/17/2022  Time:    17:59               Narrative:    EXAMINATION:  XR CHEST 1 VIEW    CLINICAL HISTORY:  Chest pain, unspecified    TECHNIQUE:  Single frontal view of the chest was performed.    COMPARISON:  03/21/2022    FINDINGS:  There are chronic appearing lung changes seen bilaterally. No evidence of acute infiltrate is seen. No mass is seen. No lesion is seen. No pleural effusion is seen. The heart appears normal. The aorta appears normal. The bones and joints show no acute abnormality.                                      Current Medications:     Infusions:   lactated ringers 2,000 mL (11/18/22 0610)        Scheduled:   apixaban  2.5 mg Oral BID    atorvastatin  10 mg Oral QHS    digoxin  0.125 mg Oral Daily    metoprolol tartrate  12.5 mg Oral BID    pantoprozole (PROTONIX) IV  40 mg Intravenous Daily        PRN:  albuterol, albuterol-ipratropium, melatonin, ondansetron, sodium chloride 0.9%    Antibiotics and Day Number of Therapy:  none    No intake or output data in the 24 hours ending 11/18/22 0713    Lines/Drains/Airways       Peripheral Intravenous Line   Duration                  Peripheral IV - Single Lumen 11/17/22 1730 Right Forearm <1 day                      Assessment & Plan:     Oropharyngeal dysphagia  GERD  Nausea  - Pt stopped home medication omeprazole 1 month ago for unknown reason  - Has not been able to tolerate PO liquids/solids  -  cc/hr for maintenance fluids  - Clear liquid diet, will advance as tolerated  - IV Protonix 40 mg daily  - IV Zofran 8 mg PRN  - CM consulted for old endoscopy records  - GI consult, appreciate recs  - SLP consult ordered, will evaluate for barium swallow study, appreciate recs     Microcytic anemia  - on admission H/H 11.5/36.9  - per chart review baseline Hgb around 13, patient denies any known source of bleeding  - Iron panel ordered, will follow  - CM consulted for old GI records     COPD  - Follows with pulmonology at [facility]  - On home oxygen, 2L   - last hospitalization for COPD exacerbation was 3/2022  - denies recent history of worsening of respiratory symptoms  - Albuterol, Duonebs as needed while inpatient     Paroxysmal atrial fibrillation  - States compliance with all home meds  - Continue Eliquis, digoxin, lopressor  - Digoxin level WNL     HFpEF  - Last echo 3/2022 showed EF 60%  - Euvolemic on exam, holding home spironolactone     HLD  - on pravastatin daily at home  - Continue atorvastatin 10 mg nightly     Hx of lung adenocarcinoma, s/p external beam radiation  - Follows with pulmonology at [facility]      CODE STATUS: Full Code  Access: Peripheral  Antibiotics: none  Diet: Clear Liquid  DVT Prophylaxis: Eliquis  GI Prophylaxis: proton pump inhibitor per orders  Fluids: lactated Ringer's 100 ml/hr.     Disposition: day 0 of admission for intractable nausea, vomiting, oropharyngeal dysphagia. Continue IV hydration. SLP and GI consulted    On discharge, will need new PCP, states hers is retiring.        Zaida Valles, DO  [facility] Internal Medicine  HO-1

## 2022-11-18 NOTE — CONSULTS
Gastroenterology/Hepatology Initial Consult Note    Patient Name: Judith Ramirez  Age: 88 y.o.  : 10/10/1934  MRN: [id number]  Admission Date: 2022    Reason for Consult:      Medical Management  Chief Complaint   Patient presents with    Chest Pain    Shortness of Breath     PT FROM  W REPORT OF DYSPHAGIA.  CO EPIGASTRIC/SUBSTERNAL CP, STATES FEELS LIKE IT IS SMOTHERING HER.  REPORTS DIFFICULTY SWALLOWING FOOD AND WATER SINCE LAST PM W NV.  EKG OBTAINED.          Self, Aaareferral    HPI:     Judith Ramirez is a 88 y.o. female with history of R lung adenocarcinoma s/p radiation , esophageal dysmotility (manometry ), GERD, gastroparesis, atrial fibrillation (on eliquis), COPD (moderate obstruction PFT ; on home O2 at 2L NC), history of PE, hyperlipidemia, CAD, history of partial gastrectomy (40+ years ago d/t ulcers) who presented to [facility] for   on 2022 with symptoms of dysphagia, unable to tolerate water or food and vomiting. She was transferred to [facility] ED for evaluation of dysphagia.     ED course: VS: /84, P 61, R 18, T 97.2° F, SpO2 95%.  Labs:  WBC 7.0, H&H 11.5/36.9, , Cl 108, BUN 17.1, creatinine 0.93, glucose 159, total protein 7.8, LFTs WNL.  Iron 28, TIBC 440, transferrin 408, ferritin 19.85, iron sat 6.  .5.    CXR: chronic changes, otherwise no acute process.     GI service consulted for dysphagia and intolerance to solids and liquids.    She states that on the evening of 22, she was eating chicken tortellini and felt like something got caught in her chest. Since then, she has not been able to tolerate liquids or solids by mouth. She denies odynophagia, but states that when she tries to eat or drink, the undigested material comes back up almost immediately. She has experienced episodes similar to this in the past but symptoms never lasted more than a few hours before the symptoms resolved and most often symptoms only lasted for a several  minutes. This is the first episode that has lasted a couple of days. She continues to have the sensation that something is stuck in her chest and it is causing pressure. She states that when she feels a belch coming on and it passes through the area where she is feeling like something is stuck, it will cause her to feel nauseated. She denies abdominal pain, hematemesis, diarrhea, melena and hematochezia.She has a history of GERD which she reports is well controlled with omeprazole and famotidine. She has been out of omeprazole and possibly famotidine for the past month. She denies reflux symptoms at present. She has a history of occasional constipation which she takes Miralax. Her last BM was 2 days ago after taking Miralax the night before. She describes the BM as multiple pieces and brown in color. She denies Melena and hematochezia.     She is followed by Dr. Diaz. She had a colonoscopy done in 2014. She thinks that she had an EGD in the past. Chart review reveals prior manometry in 2/2019 revealed moderate esophageal dysmotility with no strictures, evidence of prior antrectomy, ineffective esophageal motility disorder, recommended small doses of baclofen as tolerated. Denies regular NSAID use. Previously smoked cigarettes 1 ppd x 40+ years. She quit smoking 25 years ago. Denies illicit drug and alcohol use. Mother diagnosed with cancer unknown type, colon tumors. Father diagnosed with cancer unknown type.     Prior endoscopies:     EGD on 4/9/2014: Grade A esophagitis in the gastroesophageal junction.  No esophageal rings, strictures, and/or webs were seen to account for the patient's dysphagia. (Dilation), Previous surgery in the anastomosis. (Biopsy) and erythema in the body compatible with non-erosive gastritis. (Biopsy, Biopsy H. Pylori).     Colonoscopy on 4/2/2014 per Dr. Chris Diaz:  Normal mucosa in the terminal ileum.  Moderate diverticulosis of the transverse colon, descending colon and sigmoid  colon.  No polyp, tumor or mass in the entire colon.  Recommendation for colonoscopy p.r.n. in the future owing to age and/or comorbidity.    Allie Baer MD    Past Medical History:   Diagnosis Date    COPD (chronic obstructive pulmonary disease)     Coronary artery disease     GERD (gastroesophageal reflux disease)     Hyperlipidemia     Hypertension         Past Surgical History:   Procedure Laterality Date    CHOLECYSTECTOMY      EYE SURGERY      gum grafting      rotator cuff right      thyroid goiter      total kne left      trigger finger thumb right          No family history on file.    Social History     Tobacco Use    Smoking status: Former     Types: Cigarettes    Smokeless tobacco: Never    Tobacco comments:     quit 29 yrs ago   Substance Use Topics    Alcohol use: Not on file         Review of patient's allergies indicates:   Allergen Reactions    Aspirin Hives    Aspirin-acetaminophen     Atropine-demerol     Clindamycin Hives    Clindamycin-jagruti per-hyalur na     Darvon compound 32 [propoxyphene-asa-caffeine]     Meperidine Hives    Penicillin Hives    Penicillins     Pentazocine lactate Hives    Propoxyphene Hives    Talwin compound     Tramadol Hives    Codeine Nausea And Vomiting and Other (See Comments)        Medications Prior to Admission   Medication Sig Dispense Refill Last Dose    acetaminophen (TYLENOL) 325 MG tablet Take 650 mg by mouth.       albuterol (PROVENTIL/VENTOLIN HFA) 90 mcg/actuation inhaler INHALE 2 PUFFS EVERY 6 HOURS 120 g 4     albuterol-ipratropium (DUO-NEB) 2.5 mg-0.5 mg/3 mL nebulizer solution Inhale 3 mLs into the lungs.       apixaban (ELIQUIS) 2.5 mg Tab Take 1 tablet (2.5 mg total) by mouth 2 (two) times daily. 60 tablet 11     baclofen (LIORESAL) 5 mg Tab tablet        CONTOUR NEXT TEST STRIPS Strp USE 1 STRIP TO CHECK GLUCOSE ONCE DAILY       cyanocobalamin 1,000 mcg/mL injection        digoxin (LANOXIN) 125 mcg tablet        famotidine (PEPCID) 40 MG  tablet        furosemide (LASIX) 20 MG tablet Take 40 mg by mouth.       furosemide (LASIX) 40 MG tablet TAKE 1 TABLET BY MOUTH TWICE DAILY FOR 3 DAYS THEN ONCE DAILY THEREAFTER       hydrOXYzine pamoate (VISTARIL) 25 MG Cap Take 1 capsule (25 mg total) by mouth nightly as needed (itching). 30 capsule 1     melatonin (MELATIN) Take 1 mg by mouth.       metoprolol tartrate (LOPRESSOR) 25 MG tablet Take 12.5 mg by mouth.       mirtazapine (REMERON) 15 MG tablet Take 1 tablet (15 mg total) by mouth nightly. 90 tablet 3     omeprazole (PRILOSEC) 20 MG capsule Take 20 mg by mouth.       polyethylene glycol (GLYCOLAX) 17 gram PwPk Take by mouth.       potassium chloride SA (K-DUR,KLOR-CON) 20 MEQ tablet Take 20 mEq by mouth once daily.       pramipexole (MIRAPEX) 0.25 MG tablet Take 0.75 mg by mouth.       pravastatin (PRAVACHOL) 40 MG tablet Take 40 mg by mouth nightly.       STIOLTO RESPIMAT 2.5-2.5 mcg/actuation Mist        traZODone (DESYREL) 100 MG tablet             INPATIENT MEDICATIONS    Scheduled Meds:   apixaban  2.5 mg Oral BID    atorvastatin  10 mg Oral QHS    digoxin  0.125 mg Oral Daily    metoprolol tartrate  12.5 mg Oral BID    pantoprozole (PROTONIX) IV  40 mg Intravenous Daily     Continuous Infusions:   lactated ringers 2,000 mL (11/18/22 0610)     PRN Meds:  albuterol-ipratropium, hydrALAZINE, labetalol, melatonin, ondansetron, sodium chloride 0.9%          Review of Systems:       Review of Systems   Constitutional:  Negative for chills, fatigue and fever.   HENT:  Positive for trouble swallowing.    Respiratory:  Negative for choking.    Cardiovascular: Negative.    Gastrointestinal:  Positive for nausea and vomiting. Negative for abdominal distention, abdominal pain, anal bleeding, blood in stool, change in bowel habit, constipation, diarrhea, rectal pain, reflux, fecal incontinence and change in bowel habit.   Genitourinary:  Negative for dysuria and hematuria.   Integumentary:  Negative for  color change and pallor.   All other systems reviewed and are negative.       Objective:       VITAL SIGNS: 24 HR MIN & MAX LAST    Temp  Min: 97.2 °F (36.2 °C)  Max: 98.5 °F (36.9 °C)  98.2 °F (36.8 °C)        BP  Min: 99/55  Max: 186/82  (!) 150/68     Pulse  Min: 61  Max: 89  70     Resp  Min: 15  Max: 20  18    SpO2  Min: 82 %  Max: 97 %  (!) 82 %        Physical Exam  Constitutional:       General: She is not in acute distress.     Appearance: She is ill-appearing.   Eyes:      General: No scleral icterus.     Conjunctiva/sclera: Conjunctivae normal.   Cardiovascular:      Rate and Rhythm: Normal rate and regular rhythm.      Pulses: Normal pulses.   Pulmonary:      Effort: Pulmonary effort is normal. No respiratory distress.      Breath sounds: Normal breath sounds.   Abdominal:      General: Bowel sounds are normal. There is no distension.      Palpations: Abdomen is soft.      Tenderness: There is no abdominal tenderness.   Skin:     Coloration: Skin is not jaundiced or pale.   Neurological:      General: No focal deficit present.      Mental Status: She is alert and oriented to person, place, and time.            LABS:      Recent Labs   Lab 11/17/22 1734 11/18/22  0353   WBC 7.0 7.5   HGB 11.5* 10.3*   HCT 36.9* 33.9*    313   MCV 77.8* 79.2*       Recent Labs   Lab 11/17/22  1734 11/18/22  0353   HGB 11.5* 10.3*   HCT 36.9* 33.9*        Recent Labs   Lab 11/17/22  1734 11/18/22  0353    145   K 4.4 4.8   CO2 25 26   BUN 17.1 15.0   CREATININE 0.93 0.81   BILITOT 0.4 0.4   ALKPHOS 110 96   AST 13 13   ALT 7 5   LABPROT 7.8* 6.9   ALBUMIN 3.4 3.0*        No results for input(s): INR, PROTIME, PTT in the last 168 hours.     Recent Labs   Lab 11/17/22  1734   IRON 28*   FERRITIN 19.85          IMAGING:   X-Ray Chest 1 View    Result Date: 11/17/2022  EXAMINATION: XR CHEST 1 VIEW CLINICAL HISTORY: Chest pain, unspecified TECHNIQUE: Single frontal view of the chest was performed. COMPARISON:  03/21/2022 FINDINGS: There are chronic appearing lung changes seen bilaterally. No evidence of acute infiltrate is seen. No mass is seen. No lesion is seen. No pleural effusion is seen. The heart appears normal. The aorta appears normal. The bones and joints show no acute abnormality.     Chronic changes seen no acute process Electronically signed by: Rajinder Rose Date:    11/17/2022 Time:    17:59        Assessment / Plan:     Concerning for food bolus:  -Plan for EGD today.   -NPO for procedure.

## 2022-11-18 NOTE — TRANSFER OF CARE
"Anesthesia Transfer of Care Note    Patient: Judith Ramirez    Procedure(s) Performed: Procedure(s) (LRB):  EGD, WITH FOREIGN BODY REMOVAL (Left)    Patient location: PACU    Anesthesia Type: general    Transport from OR: Transported from OR on room air with adequate spontaneous ventilation    Post pain: adequate analgesia    Post assessment: no apparent anesthetic complications    Post vital signs: stable    Level of consciousness: awake and alert    Nausea/Vomiting: no nausea/vomiting    Complications: none    Transfer of care protocol was followedComments: Report to Rafia MANE      Last vitals:   Visit Vitals  BP (!) 143/73   Pulse 64   Temp 36.3 °C (97.3 °F)   Resp 20   Ht 5' 2" (1.575 m)   Wt 59 kg (130 lb 1.1 oz)   SpO2 100%   Breastfeeding No   BMI 23.79 kg/m²     "

## 2022-11-18 NOTE — PT/OT/SLP PROGRESS
SLP receiving orders for CSE. Chart review completed and order for MBSS noted per Dr. JAIRO Valles. SLP contacted Team 2 spoke with Dr. Riley. Pt currently NPO pending GI consult. SLP unable to complete CSE to determine if pt is appropriate for MBSS at this time.  Per chart pt noted to have history of :  GERD  Esophageal dysmotility (on manometry 2019)  Nausea 2/2 above  -Has been off PPI for the past month  -will start pantoprazole IV at this time, transition to oral once tolerating PO intake  -Zofran PRN for nausea/vomiting  - cc/hour  -clear liquid diet, advance as tolerated  -on manometry report from 2019 (Dr. Diaz), plan was for small doses of baclofen as tolerated  For ineffective esophageal motility disorder. Will consult CM in AM to obtain further endoscopy/ GI clinic records  -NPO mn for possible GI eval     SLP to hold assessment at this time and await GI clearance.     SLP discussed esophagram vs MBSS with Dr. Riley due to pt's history.     Fatoumata Seymour, MS, CCC-SLP  11/18/2022

## 2022-11-18 NOTE — PROGRESS NOTES
"Inpatient Nutrition Evaluation    Admit Date: 2022   Total duration of encounter: 1 day    Nutrition Recommendation/Prescription     Once medically able ADAT to GI Soft diet, consistency per SLP  Boost Breeze TID once diet advanced  Monitor Weights Weekly     Nutrition Assessment     Chart Review    Reason Seen: continuous nutrition monitoring and malnutrition screening tool    Diagnosis:  GERD, Esophageal dysmotility, Nausea, COPD, Microcytic anemia, Afib, HLD    Relevant Medical History: Rt lung CA, Esophageal Dysmotility, Afib, PE, HLD    Nutrition-Related Medications: Atorvastatin, Protonix    Nutrition-Related Labs:  22 -- Glu 114, K 4.8, BUN 15, Cr 0.8    Diet Order: Diet NPO  Oral Supplement Order: none  Appetite/Oral Intake: good/NPO  Factors Affecting Nutritional Intake: NPO and Regurgitation  Food/Cheondoism/Cultural Preferences: none reported  Food Allergies: none reported       Wound(s):   skin intact     Comments    22 -- Pt currently NPO this am for GI consult; SLP unable to complete swallow eval d/t NPO status related to GI consult; pt reports good appetite however occasional "spitting" & food coming up, reports happing more often lately; Denies changes in eating, reporting good oral intake; Does not like milky ONS, willing to try Boost Breeze once diet advanced    Anthropometrics    Height: 5' 2" (157.5 cm) Height Method: Stated  Last Weight: 59 kg (130 lb 1.1 oz) (22 1638) Weight Method: Standard Scale  BMI (Calculated): 23.8  BMI Classification: normal (BMI 18.5-24.9)     Ideal Body Weight (IBW), Female: 110 lb     % Ideal Body Weight, Female (lb): 118.25 %                    Usual Body Weight (UBW), k.3 kg  % Usual Body Weight: 96.45     Usual Weight Provided By: patient    Wt Readings from Last 5 Encounters:   22 59 kg (130 lb 1.1 oz)   22 59.7 kg (131 lb 9.6 oz)   22 62.6 kg (138 lb)   22 61.7 kg (136 lb)   22 63 kg (139 lb)     Weight " Change(s) Since Admission:  Admit Weight: 59 kg (130 lb 1.1 oz) (11/17/22 1638)  ~4% wt loss x 2 months, not significant -- pt reports weighs self regularly d/t fluid    Patient Education    Not applicable.    Monitoring & Evaluation     Dietitian will monitor food and beverage intake, weight change, electrolyte/renal panel, glucose/endocrine profile, and gastrointestinal profile.  Nutrition Risk/Follow-Up: low (follow-up in 5-7 days)  Patients assigned 'low nutrition risk' status do not qualify for a full nutritional assessment but will be monitored and re-evaluated in a 5-7 day time period. Please consult if re-evaluation needed sooner.

## 2022-11-18 NOTE — ANESTHESIA POSTPROCEDURE EVALUATION
Anesthesia Post Evaluation    Patient: Judith Ramirez    Procedure(s) Performed: Procedure(s) (LRB):  EGD, WITH FOREIGN BODY REMOVAL (Left)    Final Anesthesia Type: general      Patient location during evaluation: PACU  Post-procedure vital signs: reviewed and stable  Airway patency: patent      Anesthetic complications: no      Cardiovascular status: hemodynamically stable  Respiratory status: spontaneous ventilation  Follow-up not needed.          Vitals Value Taken Time   /63 11/18/22 1530   Temp 36.9 °C (98.5 °F) 11/18/22 1500   Pulse 73 11/18/22 1530   Resp 18 11/18/22 1530   SpO2 91 % 11/18/22 1530         No case tracking events are documented in the log.      Pain/Saji Score: No data recorded

## 2022-11-18 NOTE — ANESTHESIA PREPROCEDURE EVALUATION
11/18/2022  Judith Ramirez is a 88 y.o., female with PMHx of CAD, HTN, HLD, Afib, HFpEF, h/o lung CA, COPD (home oxygen), GERD, anxiety presents for EGD secondary to oropharyngeal dysphagia  .    Metoprolol--last dose 11/18/22 @ 0943    Active Ambulatory Problems     Diagnosis Date Noted    Primary cancer of right lower lobe of lung 08/01/2022    Centrilobular emphysema 08/01/2022    Hypertension 08/08/2022    Hypercholesterolemia 08/08/2022    Neuropathy 08/08/2022    Supraventricular tachycardia 08/08/2022    Pulmonary emphysema 08/08/2022    Diverticulosis 11/17/2022    Former smoker 11/17/2022    History of osteoporosis 11/17/2022    Gastroesophageal reflux disease 11/17/2022    Adenocarcinoma of lung 11/17/2022    Age related osteoporosis 11/17/2022    Anxiety state 11/17/2022    Cardiac conduction disorder 11/17/2022    Closed fracture of base of fifth metatarsal bone 11/17/2022    Gastroparesis 11/17/2022    Lung mass 11/17/2022    Polyp of colon 11/17/2022    Restless legs syndrome 11/17/2022    Urinary tract infection 11/02/2020     Resolved Ambulatory Problems     Diagnosis Date Noted    Pulmonary embolism 08/08/2022     Past Medical History:   Diagnosis Date    COPD (chronic obstructive pulmonary disease)     Coronary artery disease     GERD (gastroesophageal reflux disease)     Hyperlipidemia        Pre-op Assessment    I have reviewed the NPO Status.      Review of Systems  Anesthesia Hx:  No problems with previous Anesthesia    Social:  Former Smoker    Hematology/Oncology:         -- Cancer in past history: Other (see Oncology comments) radiation    Cardiovascular:   Hypertension CAD  Dysrhythmias atrial fibrillation CHF hyperlipidemia    Pulmonary:   COPD, severe    Renal/:  Renal/ Normal     Hepatic/GI:   GERD, well controlled    Neurological:  Neurology Normal     Endocrine:  Endocrine Normal      Vitals:    11/18/22 0759 11/18/22 0943 11/18/22 1136 11/18/22 1313   BP: (!) 150/68 (!) 150/68 (!) 151/70 (!) 143/73   Pulse: 70 70 64    Resp:    20   Temp: 36.8 °C (98.2 °F)  36.7 °C (98.1 °F) 36.3 °C (97.3 °F)   TempSrc: Oral  Oral    SpO2: (!) 82%  (!) 92% 100%   Weight:       Height:             Physical Exam  General: Alert, Cooperative and Well nourished    Airway:  Mallampati: II   Mouth Opening: Normal  TM Distance: Normal  Tongue: Normal  Neck ROM: Normal ROM    Dental:  Dentures    Chest/Lungs:Distant bilaterally  Heart:  Rate: Normal  Rhythm: Irregularly Irregular  Sounds: Normal      Lab Results   Component Value Date    WBC 7.5 11/18/2022    HGB 10.3 (L) 11/18/2022    HCT 33.9 (L) 11/18/2022    MCV 79.2 (L) 11/18/2022     11/18/2022     CMP  Sodium Level   Date Value Ref Range Status   11/18/2022 145 136 - 145 mmol/L Final     Potassium Level   Date Value Ref Range Status   11/18/2022 4.8 3.5 - 5.1 mmol/L Final     Carbon Dioxide   Date Value Ref Range Status   11/18/2022 26 23 - 31 mmol/L Final     Blood Urea Nitrogen   Date Value Ref Range Status   11/18/2022 15.0 9.8 - 20.1 mg/dL Final     Creatinine   Date Value Ref Range Status   11/18/2022 0.81 0.55 - 1.02 mg/dL Final     Calcium Level Total   Date Value Ref Range Status   11/18/2022 9.5 8.4 - 10.2 mg/dL Final     Albumin Level   Date Value Ref Range Status   11/18/2022 3.0 (L) 3.4 - 4.8 gm/dL Final     Bilirubin Total   Date Value Ref Range Status   11/18/2022 0.4 <=1.5 mg/dL Final     Alkaline Phosphatase   Date Value Ref Range Status   11/18/2022 96 40 - 150 unit/L Final     Aspartate Aminotransferase   Date Value Ref Range Status   11/18/2022 13 5 - 34 unit/L Final     Alanine Aminotransferase   Date Value Ref Range Status   11/18/2022 5 0 - 55 unit/L Final     eGFR   Date Value Ref Range Status   11/18/2022 >60 mls/min/1.73/m2 Final           Anesthesia Plan  Type of Anesthesia, risks & benefits  discussed:    Anesthesia Type: Gen ETT  Intra-op Monitoring Plan: Standard ASA Monitors  Post Op Pain Control Plan: IV/PO Opioids PRN  Induction:  IV  Airway Plan: Direct  Informed Consent: Informed consent signed with the Patient and all parties understand the risks and agree with anesthesia plan.  All questions answered.   ASA Score: 3  Day of Surgery Review of History & Physical: H&P Update referred to the surgeon/provider.    Ready For Surgery From Anesthesia Perspective.     .

## 2022-11-18 NOTE — PLAN OF CARE
11/18/22 1439   Discharge Assessment   Assessment Type Discharge Planning Assessment   Confirmed/corrected address, phone number and insurance Yes   Confirmed Demographics Correct on Facesheet   Source of Information family   When was your last doctors appointment?   (Allie Etienne)   Reason For Admission CP   Lives With alone   Facility Arrived From: Home   Do you expect to return to your current living situation? Yes   Do you have help at home or someone to help you manage your care at home? Yes   Who are your caregiver(s) and their phone number(s)? Iman Ramirez (Daughter) [phone]  Gloria Ramirez (Daughter) [phone]   Prior to hospitilization cognitive status: Alert/Oriented   Current cognitive status: Alert/Oriented   Walking or Climbing Stairs Difficulty ambulation difficulty, requires equipment   Mobility Management Walker, Wheelchair   Dressing/Bathing Difficulty none   Home Accessibility wheelchair accessible   Equipment Currently Used at Home walker, standard;wheelchair;oxygen;respiratory supplies   Patient currently being followed by outpatient case management? No   Do you currently have service(s) that help you manage your care at home? No   Do you take prescription medications? Yes   Do you have prescription coverage? Yes   Coverage MEDICARE - MEDICARE PART A & B   Do you have any problems affording any of your prescribed medications? No   Is the patient taking medications as prescribed? yes   Who is going to help you get home at discharge? Family   How do you get to doctors appointments? family or friend will provide   Are you on dialysis? No   Do you take coumadin? No   Discharge Plan A Home with family   DME Needed Upon Discharge  none   Discharge Plan discussed with: Adult children  (Iman Ramirez [phone])   Discharge Barriers Identified None   Physical Activity   On average, how many days per week do you engage in moderate to strenuous exercise (like a brisk walk)? 0 days   On  average, how many minutes do you engage in exercise at this level? 0 min   Financial Resource Strain   How hard is it for you to pay for the very basics like food, housing, medical care, and heating? Not hard   Housing Stability   In the last 12 months, was there a time when you were not able to pay the mortgage or rent on time? N   In the last 12 months, how many places have you lived? 1   In the last 12 months, was there a time when you did not have a steady place to sleep or slept in a shelter (including now)? N   Transportation Needs   In the past 12 months, has lack of transportation kept you from medical appointments or from getting medications? no   In the past 12 months, has lack of transportation kept you from meetings, work, or from getting things needed for daily living? No   Food Insecurity   Within the past 12 months, you worried that your food would run out before you got the money to buy more. Never true   Within the past 12 months, the food you bought just didn't last and you didn't have money to get more. Never true   Stress   Do you feel stress - tense, restless, nervous, or anxious, or unable to sleep at night because your mind is troubled all the time - these days? Not at all   Social Connections   In a typical week, how many times do you talk on the phone with family, friends, or neighbors? More than 3   How often do you get together with friends or relatives? More than 3   How often do you attend Rastafari or Sabianism services? 1 to 4   Do you belong to any clubs or organizations such as Rastafari groups, unions, fraternal or athletic groups, or school groups? No   How often do you attend meetings of the clubs or organizations you belong to? Never   Are you , , , , never , or living with a partner?    Alcohol Use   Q1: How often do you have a drink containing alcohol? Never   Q2: How many drinks containing alcohol do you have on a typical day when you are  drinking? None   Q3: How often do you have six or more drinks on one occasion? Never

## 2022-11-18 NOTE — ANESTHESIA PROCEDURE NOTES
Intubation    Date/Time: 11/18/2022 1:39 PM  Performed by: Skye Phillips CRNA  Authorized by: Mya Lowe MD     Intubation:     Induction:  Intravenous    Intubated:  Postinduction    Mask Ventilation:  N/a    Attempts:  1    Attempted By:  Student    Method of Intubation:  Direct    Blade:  Heidi 4    Laryngeal View Grade: Grade IIA - cords partially seen      Difficult Airway Encountered?: No      Complications:  None    Airway Device:  Oral endotracheal tube    Airway Device Size:  7.5    Style/Cuff Inflation:  Cuffed (inflated to minimal occlusive pressure)    Inflation Amount (mL):  6    Tube secured:  21    Secured at:  The lips    Placement Verified By:  Capnometry    Complicating Factors:  None    Findings Post-Intubation:  BS equal bilateral and atraumatic/condition of teeth unchanged

## 2022-11-18 NOTE — PLAN OF CARE
Spoke to Janie at Dr. Diaz's office, P: [phone], to request records. She will fax patient's last colonoscopy record and most recent office visit. Will scan to patient's chart when available.

## 2022-11-19 LAB
ALBUMIN SERPL-MCNC: 2.6 GM/DL (ref 3.4–4.8)
ALBUMIN/GLOB SERPL: 0.8 RATIO (ref 1.1–2)
ALP SERPL-CCNC: 83 UNIT/L (ref 40–150)
ALT SERPL-CCNC: <5 UNIT/L (ref 0–55)
AST SERPL-CCNC: 11 UNIT/L (ref 5–34)
BASOPHILS # BLD AUTO: 0.03 X10(3)/MCL (ref 0–0.2)
BASOPHILS NFR BLD AUTO: 0.3 %
BILIRUBIN DIRECT+TOT PNL SERPL-MCNC: 0.3 MG/DL
BUN SERPL-MCNC: 13.7 MG/DL (ref 9.8–20.1)
CALCIUM SERPL-MCNC: 8.8 MG/DL (ref 8.4–10.2)
CHLORIDE SERPL-SCNC: 109 MMOL/L (ref 98–107)
CO2 SERPL-SCNC: 24 MMOL/L (ref 23–31)
CREAT SERPL-MCNC: 0.74 MG/DL (ref 0.55–1.02)
EOSINOPHIL # BLD AUTO: 0 X10(3)/MCL (ref 0–0.9)
EOSINOPHIL NFR BLD AUTO: 0 %
ERYTHROCYTE [DISTWIDTH] IN BLOOD BY AUTOMATED COUNT: 20.1 % (ref 11.5–17)
GFR SERPLBLD CREATININE-BSD FMLA CKD-EPI: >60 MLS/MIN/1.73/M2
GLOBULIN SER-MCNC: 3.4 GM/DL (ref 2.4–3.5)
GLUCOSE SERPL-MCNC: 127 MG/DL (ref 82–115)
HCT VFR BLD AUTO: 31.2 % (ref 37–47)
HGB BLD-MCNC: 9.3 GM/DL (ref 12–16)
IMM GRANULOCYTES # BLD AUTO: 0.02 X10(3)/MCL (ref 0–0.04)
IMM GRANULOCYTES NFR BLD AUTO: 0.2 %
LYMPHOCYTES # BLD AUTO: 1.32 X10(3)/MCL (ref 0.6–4.6)
LYMPHOCYTES NFR BLD AUTO: 14.8 %
MAGNESIUM SERPL-MCNC: 2 MG/DL (ref 1.6–2.6)
MCH RBC QN AUTO: 23.4 PG (ref 27–31)
MCHC RBC AUTO-ENTMCNC: 29.8 MG/DL (ref 33–36)
MCV RBC AUTO: 78.6 FL (ref 80–94)
MONOCYTES # BLD AUTO: 0.62 X10(3)/MCL (ref 0.1–1.3)
MONOCYTES NFR BLD AUTO: 7 %
NEUTROPHILS # BLD AUTO: 6.9 X10(3)/MCL (ref 2.1–9.2)
NEUTROPHILS NFR BLD AUTO: 77.7 %
NRBC BLD AUTO-RTO: 0 %
PHOSPHATE SERPL-MCNC: 3.8 MG/DL (ref 2.3–4.7)
PLATELET # BLD AUTO: 267 X10(3)/MCL (ref 130–400)
PMV BLD AUTO: 9.4 FL (ref 7.4–10.4)
POTASSIUM SERPL-SCNC: 4.3 MMOL/L (ref 3.5–5.1)
PROT SERPL-MCNC: 6 GM/DL (ref 5.8–7.6)
RBC # BLD AUTO: 3.97 X10(6)/MCL (ref 4.2–5.4)
SODIUM SERPL-SCNC: 141 MMOL/L (ref 136–145)
WBC # SPEC AUTO: 8.9 X10(3)/MCL (ref 4.5–11.5)

## 2022-11-19 PROCEDURE — 84100 ASSAY OF PHOSPHORUS: CPT | Performed by: INTERNAL MEDICINE

## 2022-11-19 PROCEDURE — 36415 COLL VENOUS BLD VENIPUNCTURE: CPT | Performed by: INTERNAL MEDICINE

## 2022-11-19 PROCEDURE — 83735 ASSAY OF MAGNESIUM: CPT | Performed by: INTERNAL MEDICINE

## 2022-11-19 PROCEDURE — 25000003 PHARM REV CODE 250: Performed by: INTERNAL MEDICINE

## 2022-11-19 PROCEDURE — 94761 N-INVAS EAR/PLS OXIMETRY MLT: CPT

## 2022-11-19 PROCEDURE — 80053 COMPREHEN METABOLIC PANEL: CPT | Performed by: INTERNAL MEDICINE

## 2022-11-19 PROCEDURE — 85025 COMPLETE CBC W/AUTO DIFF WBC: CPT | Performed by: INTERNAL MEDICINE

## 2022-11-19 PROCEDURE — 21400001 HC TELEMETRY ROOM

## 2022-11-19 PROCEDURE — 99900035 HC TECH TIME PER 15 MIN (STAT)

## 2022-11-19 PROCEDURE — 97162 PT EVAL MOD COMPLEX 30 MIN: CPT

## 2022-11-19 PROCEDURE — 27000221 HC OXYGEN, UP TO 24 HOURS

## 2022-11-19 RX ADMIN — APIXABAN 2.5 MG: 2.5 TABLET, FILM COATED ORAL at 08:11

## 2022-11-19 RX ADMIN — PANTOPRAZOLE 40 MG: 40 TABLET, DELAYED RELEASE ORAL at 08:11

## 2022-11-19 RX ADMIN — ATORVASTATIN CALCIUM 10 MG: 10 TABLET, FILM COATED ORAL at 09:11

## 2022-11-19 RX ADMIN — APIXABAN 2.5 MG: 2.5 TABLET, FILM COATED ORAL at 09:11

## 2022-11-19 NOTE — PLAN OF CARE
Problem: Adult Inpatient Plan of Care  Goal: Plan of Care Review  Outcome: Ongoing, Progressing  Goal: Patient-Specific Goal (Individualized)  Outcome: Ongoing, Progressing  Goal: Absence of Hospital-Acquired Illness or Injury  Outcome: Ongoing, Progressing  Goal: Optimal Comfort and Wellbeing  Outcome: Ongoing, Progressing  Goal: Readiness for Transition of Care  Outcome: Ongoing, Progressing     Problem: Eating/Swallowing Impairment  Goal: Optimal Eating/Swallowing without Aspir  Outcome: Ongoing, Progressing

## 2022-11-19 NOTE — PROGRESS NOTES
"[facility]   Progress Note     Resident Team: [facility] Medicine List 2  Attending Physician: Elton Sahu MD  Resident: Ralph Kim  Intern: Rosemary Valles Chin   Hospital Length of Stay: 1 days    Subjective:      Brief HPI:  Judith Ramirez is a 88 y.o. female who  has a past medical history of COPD (chronic obstructive pulmonary disease), Coronary artery disease, GERD (gastroesophageal reflux disease), hyperlipidemia, and hypertension, lung adenocarcinoma s/p external beam radiation. The patient presented to [facility] ED on 11/17/2022 with a primary complaint of reflux, inability to tolerate PO liquids/solids, and "spitting up". She states that on 11/16, she was eating shrimp and felt like something got caught in her chest. Since then, she has not been able to tolerate liquids or solids by mouth. She denies odynophagia, but states that when she tries to eat or drink, the undigested material comes back up almost immediately.  She states also that even when she is not trying to eat, she periodically spits up white foam. She states that approximately one month ago she was hospitalized for a COPD exacerbation and after that hospitalization has been off of omeprazole, which she has taken for several years. She states that she has had "stomach troubles all her life" and previously had part of her stomach resected due to gastric ulcers, unable to recall exactly when, but states that it was several years ago. She is an established patient of Dr. Diaz, has an appointment this Tuesday. She denies chest pain, shortness of breath, wheezing, palpitations, swelling in extremities, melena, bleeding per rectum.     Chart review reveals prior manometry in 2/2019 revealed moderate esophageal dysmotility with no strictures, evidence of prior antrectomy, "ineffective esophageal motility disorder", recommended "small doses of baclofen as tolerated".     In the ED she received zofran and protonix, with little to no relief of her " symptoms. Labs were remarkable for microcytic anemia. She had negative troponin x2, CXR was unremarkable, EKG was significant for inferior lateral changes. BNP elevated at 211 but is chronically elevated per chart review. Internal medicine consulted for admission for workup of oropharyngeal dysphagia.    Interval History: This morning patient feels much improved. States she is drinking plenty of fluids and reports increased appetite. She denies choking, drooling, abdominal pain, n/v/c/d. She reports decreased activity since being in the hospital, encouraged pt to get up and move around, sit in chair during the day etc, and she seems agreeable. No complaints at this time.      Review of Systems:  Constitutional: no fever, fatigue, weakness  Eye: no vision loss, eye redness, drainage, or pain  ENMT: no sore throat, ear pain, sinus pain/congestion, nasal congestion/drainage  Respiratory: no cough, no wheezing, no shortness of breath  Cardiovascular: no chest pain, no palpitations, no edema  Gastrointestinal: no nausea, no vomiting, denies abdominal/epigastric pain  Genitourinary: no dysuria, no urinary frequency or urgency, no hematuria  Hema/Lymph: no abnormal bruising or bleeding  Endocrine: no heat or cold intolerance, no excessive thirst or excessive urination  Musculoskeletal: no muscle or joint pain, no joint swelling  Integumentary: no skin rash or abnormal lesion  Neurologic: no headache, no dizziness, no weakness or numbness       Objective:     Vital Signs (Most Recent):  Temp: 97.9 °F (36.6 °C) (11/19/22 0751)  Pulse: (!) 50 (11/19/22 0751)  Resp: 18 (11/18/22 1923)  BP: (!) 127/58 (11/19/22 0751)  SpO2: (!) 94 % (11/19/22 0751)   Vital Signs (24h Range):  Temp:  [97.3 °F (36.3 °C)-98.5 °F (36.9 °C)] 97.9 °F (36.6 °C)  Pulse:  [50-89] 50  Resp:  [17-20] 18  SpO2:  [90 %-100 %] 94 %  BP: (106-151)/(52-77) 127/58       Physical Examination:  General: no distress, O2 sat low 90s on 2L NC  Eye: pupils equal,  EOMI, clear conjunctiva, eyelids normal  HENT: Head-normocephalic and atraumatic  Neck: full range of motion, trachea midline, supple  Respiratory: clear to auscultation bilaterally without wheezes, rales, rhonchi  Cardiovascular: regular rate and rhythm without murmurs.  No gallops or rubs no JVD  Gastrointestinal: soft, non-tender, non-distended with normal bowel sounds, without masses to palpation  Genitourinary: no CVA tenderness to palpation  Musculoskeletal: full range of motion of all extremities/spine without limitation or discomfort  Integumentary: no rashes or skin lesions present  Neurologic: no signs of peripheral neurological deficit, motor/sensory function intact  Psychiatric:  alert and oriented, cognitive function intact, cooperative with exam, good eye contact    Laboratory:  Most Recent Data:  Recent Lab Results         11/19/22  0301        Albumin/Globulin Ratio 0.8       Albumin 2.6       Alkaline Phosphatase 83       ALT <5       AST 11       Baso # 0.03       Basophil % 0.3       BILIRUBIN TOTAL 0.3       BUN 13.7       Calcium 8.8       Chloride 109       CO2 24       Creatinine 0.74       eGFR >60       Eos # 0.00       Eosinophil % 0.0       Globulin, Total 3.4       Glucose 127       Hematocrit 31.2       Hemoglobin 9.3       Immature Grans (Abs) 0.02       Immature Granulocytes 0.2       Lymph # 1.32       LYMPH % 14.8       Magnesium 2.00       MCH 23.4       MCHC 29.8       MCV 78.6       Mono # 0.62       Mono % 7.0       MPV 9.4       Neut # 6.9       Neut % 77.7       nRBC 0.0       Phosphorus 3.8       Platelets 267       Potassium 4.3       PROTEIN TOTAL 6.0       RBC 3.97       RDW 20.1       Sodium 141       WBC 8.9                 Microbiology Data Reviewed: yes  Pertinent Findings:  none    Other Results:  EKG (my interpretation): atrial fibrillation, rate 73, possible inferolateral changes noted.    Radiology:  Imaging Results              X-Ray Chest 1 View (Final result)   Result time 11/17/22 17:59:02      Final result by Rajinder Rose MD (11/17/22 17:59:02)                   Impression:      Chronic changes seen no acute process      Electronically signed by: Rajinder Rose  Date:    11/17/2022  Time:    17:59               Narrative:    EXAMINATION:  XR CHEST 1 VIEW    CLINICAL HISTORY:  Chest pain, unspecified    TECHNIQUE:  Single frontal view of the chest was performed.    COMPARISON:  03/21/2022    FINDINGS:  There are chronic appearing lung changes seen bilaterally. No evidence of acute infiltrate is seen. No mass is seen. No lesion is seen. No pleural effusion is seen. The heart appears normal. The aorta appears normal. The bones and joints show no acute abnormality.                                      Current Medications:     Infusions:         Scheduled:   apixaban  2.5 mg Oral BID    atorvastatin  10 mg Oral QHS    digoxin  0.125 mg Oral Daily    metoprolol tartrate  12.5 mg Oral BID    pantoprazole  40 mg Oral Daily        PRN:  albuterol-ipratropium, hydrALAZINE, labetalol, melatonin, ondansetron, sodium chloride 0.9%, sodium chloride 0.9%    Antibiotics and Day Number of Therapy:  none      Intake/Output Summary (Last 24 hours) at 11/19/2022 0947  Last data filed at 11/18/2022 1844  Gross per 24 hour   Intake 2971.67 ml   Output 400 ml   Net 2571.67 ml       Lines/Drains/Airways       Peripheral Intravenous Line  Duration                  Peripheral IV - Single Lumen 11/17/22 1730 Right Forearm 1 day                      Assessment & Plan:     Dysphagia 2/2 food impaction  Nausea  - Pt stopped home medication omeprazole 1 month ago for unknown reason  - Was not able to tolerate PO liquids/solids for approx 4-5 days  - S/p EGD which revealed food bolus, no strictures, pt feeling much better today and tolerating PO.  - Clear liquid diet, will advance to soft diet today  - Now transitioned to oral protonix 40 mg qd.  - IV Zofran 8 mg PRN for nausea, has not  requested today     Microcytic anemia  - on admission H/H 11.5/36.9  - per chart review baseline Hgb around 13, patient denies any known source of bleeding  - Iron panel ordered, will follow  - CM consulted for old GI records     COPD  - Follows with pulmonology at [facility]  - On home oxygen, 2L   - last hospitalization for COPD exacerbation was 3/2022  - denies recent history of worsening of respiratory symptoms  - Albuterol, Duonebs as needed while inpatient     Paroxysmal atrial fibrillation  - States compliance with all home meds  - Continue Eliquis, digoxin, lopressor  - Digoxin level WNL     HFpEF  - Last echo 3/2022 showed EF 60%  - Euvolemic on exam, holding home spironolactone     HLD  - on pravastatin daily at home  - Continue atorvastatin 10 mg nightly     Hx of lung adenocarcinoma, s/p external beam radiation  - Follows with pulmonology at [facility]      CODE STATUS: Full Code  Access: Peripheral  Antibiotics: none  Diet: Clear Liquid  DVT Prophylaxis: Eliquis  GI Prophylaxis: proton pump inhibitor per orders  Fluids: lactated Ringer's 100 ml/hr.     Disposition: day 1 of admission for intractable nausea, vomiting, dysphagia. Now s/p EGD which revealed food impaction. Pt feeling much better, advancing diet today, work with PT/OT today, likely discharge tomorrow    On discharge, will need new PCP, states hers is retiring.        Zaida Valles, DO  [facility] Internal Medicine  HO-1

## 2022-11-19 NOTE — PT/OT/SLP EVAL
Physical Therapy Evaluation    Patient Name:  Judith Ramirez   MRN:  [id number]    Recommendations:     Discharge Recommendations:  home   Discharge Equipment Recommendations: none   Barriers to discharge: None    Assessment:     Judith Ramirez is a 88 y.o. female admitted with a medical diagnosis of Gastroesophageal reflux disease.  She presents with the following impairments/functional limitations:  weakness, impaired endurance, gait instability, impaired cardiopulmonary response to activity .    Rehab Prognosis: Good; patient would benefit from acute skilled PT services to address these deficits and reach maximum level of function.    Recent Surgery: Procedure(s) (LRB):  EGD, WITH FOREIGN BODY REMOVAL (Left) 1 Day Post-Op    Plan:     During this hospitalization, patient to be seen  (3-5x/wk) to address the identified rehab impairments via gait training, therapeutic activities, therapeutic exercises and progress toward the following goals:    Plan of Care Expires:  12/19/22    Subjective     Chief Complaint: none at this time  Patient/Family Comments/goals: return home  Pain/Comfort:  Pain Rating 1: 0/10    Patients cultural, spiritual, Yazidi conflicts given the current situation: no    Living Environment:  Lives alone in a single level home with 1 step.  Pt has no difficulty getting in and out of the home.  Her 2 sons live on each side of her.  Prior to admission, patients level of function was independent.  Equipment used at home: oxygen, bedside commode, walker, rolling, bath bench, wheelchair.  DME owned (not currently used): none.  Upon discharge, patient will have assistance from sons.    Objective:     Communicated with nurse Valerio prior to session.  Patient found supine with telemetry, oxygen  upon PT entry to room.    General Precautions: Standard, fall   Orthopedic Precautions:N/A   Braces: N/A  Respiratory Status: Nasal cannula, flow 2 L/min    Exams:  Cognitive Exam:  Patient is oriented to Person,  Place, Time, and Situation  Fine Motor Coordination:    -       Intact  Gross Motor Coordination:  WFL  Sensation:    -       Intact  RUE ROM: WFL  RUE Strength: WFL  LUE ROM: WFL  LUE Strength: WFL  RLE ROM: WFL  RLE Strength: WFL  LLE ROM: WFL  LLE Strength: WFL    Functional Mobility:  Bed Mobility:     Rolling Right: modified independence  Supine to Sit: modified independence  Sit to Supine: stand by assistance  Transfers:     Sit to Stand:  stand by assistance with no AD  Gait: 50' Min A with no AD; 130' SBA with RW; standing rest break x1. Decreased ailyn and step length with no AD; improved gait with RW.  Balance: Good      Treatment & Education:  Bed mobility  Transfers  Gait    Patient left supine with all lines intact, call button in reach, and nurse Iman notified. RW #5 left in the room    GOALS:   Multidisciplinary Problems       Physical Therapy Goals          Problem: Physical Therapy    Goal Priority Disciplines Outcome Goal Variances Interventions   Physical Therapy Goal     PT, PT/OT      Description: Goals to be met by: discharge     Patient will increase functional independence with mobility by performin. Sit to stand transfer with [place]  2. Gait  x 260 feet with Modified [place] using Rolling Walker/no AD.                          History:     Past Medical History:   Diagnosis Date    COPD (chronic obstructive pulmonary disease)     Coronary artery disease     GERD (gastroesophageal reflux disease)     Hyperlipidemia     Hypertension        Past Surgical History:   Procedure Laterality Date    CHOLECYSTECTOMY      EYE SURGERY      gum grafting      rotator cuff right      thyroid goiter      total kne left      trigger finger thumb right         Time Tracking:     PT Received On: 22  PT Start Time: 1208     PT Stop Time: 1230  PT Total Time (min): 22 min     Billable Minutes: Evaluation 22 mins      2022

## 2022-11-20 VITALS
OXYGEN SATURATION: 92 % | TEMPERATURE: 98 F | SYSTOLIC BLOOD PRESSURE: 130 MMHG | HEART RATE: 62 BPM | BODY MASS INDEX: 23.93 KG/M2 | RESPIRATION RATE: 18 BRPM | DIASTOLIC BLOOD PRESSURE: 70 MMHG | WEIGHT: 130.06 LBS | HEIGHT: 62 IN
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
## 2022-11-20 PROBLEM — K56.699 FOOD BOLUS OBSTRUCTION OF INTESTINE: Status: RESOLVED | Noted: 2022-11-18 | Resolved: 2022-11-20

## 2022-11-20 LAB
ALBUMIN SERPL-MCNC: 2.6 GM/DL (ref 3.4–4.8)
ALBUMIN/GLOB SERPL: 0.8 RATIO (ref 1.1–2)
ALP SERPL-CCNC: 86 UNIT/L (ref 40–150)
ALT SERPL-CCNC: 6 UNIT/L (ref 0–55)
AST SERPL-CCNC: 16 UNIT/L (ref 5–34)
BASOPHILS # BLD AUTO: 0.03 X10(3)/MCL (ref 0–0.2)
BASOPHILS NFR BLD AUTO: 0.4 %
BILIRUBIN DIRECT+TOT PNL SERPL-MCNC: 0.5 MG/DL
BUN SERPL-MCNC: 13 MG/DL (ref 9.8–20.1)
CALCIUM SERPL-MCNC: 8.8 MG/DL (ref 8.4–10.2)
CHLORIDE SERPL-SCNC: 108 MMOL/L (ref 98–107)
CO2 SERPL-SCNC: 24 MMOL/L (ref 23–31)
CREAT SERPL-MCNC: 0.7 MG/DL (ref 0.55–1.02)
EOSINOPHIL # BLD AUTO: 0.12 X10(3)/MCL (ref 0–0.9)
EOSINOPHIL NFR BLD AUTO: 1.4 %
ERYTHROCYTE [DISTWIDTH] IN BLOOD BY AUTOMATED COUNT: 20.4 % (ref 11.5–17)
GFR SERPLBLD CREATININE-BSD FMLA CKD-EPI: >60 MLS/MIN/1.73/M2
GLOBULIN SER-MCNC: 3.3 GM/DL (ref 2.4–3.5)
GLUCOSE SERPL-MCNC: 90 MG/DL (ref 82–115)
HCT VFR BLD AUTO: 34.2 % (ref 37–47)
HGB BLD-MCNC: 10.3 GM/DL (ref 12–16)
IMM GRANULOCYTES # BLD AUTO: 0.03 X10(3)/MCL (ref 0–0.04)
IMM GRANULOCYTES NFR BLD AUTO: 0.4 %
LYMPHOCYTES # BLD AUTO: 2.08 X10(3)/MCL (ref 0.6–4.6)
LYMPHOCYTES NFR BLD AUTO: 24.4 %
MAGNESIUM SERPL-MCNC: 1.9 MG/DL (ref 1.6–2.6)
MCH RBC QN AUTO: 23.8 PG (ref 27–31)
MCHC RBC AUTO-ENTMCNC: 30.1 MG/DL (ref 33–36)
MCV RBC AUTO: 79 FL (ref 80–94)
MONOCYTES # BLD AUTO: 0.77 X10(3)/MCL (ref 0.1–1.3)
MONOCYTES NFR BLD AUTO: 9 %
NEUTROPHILS # BLD AUTO: 5.5 X10(3)/MCL (ref 2.1–9.2)
NEUTROPHILS NFR BLD AUTO: 64.4 %
NRBC BLD AUTO-RTO: 0 %
PHOSPHATE SERPL-MCNC: 2.7 MG/DL (ref 2.3–4.7)
PLATELET # BLD AUTO: 274 X10(3)/MCL (ref 130–400)
PMV BLD AUTO: 9.5 FL (ref 7.4–10.4)
POTASSIUM SERPL-SCNC: 3.6 MMOL/L (ref 3.5–5.1)
PROT SERPL-MCNC: 5.9 GM/DL (ref 5.8–7.6)
RBC # BLD AUTO: 4.33 X10(6)/MCL (ref 4.2–5.4)
SODIUM SERPL-SCNC: 141 MMOL/L (ref 136–145)
WBC # SPEC AUTO: 8.5 X10(3)/MCL (ref 4.5–11.5)

## 2022-11-20 PROCEDURE — 63600175 PHARM REV CODE 636 W HCPCS

## 2022-11-20 PROCEDURE — 85025 COMPLETE CBC W/AUTO DIFF WBC: CPT | Performed by: INTERNAL MEDICINE

## 2022-11-20 PROCEDURE — 25000003 PHARM REV CODE 250: Performed by: INTERNAL MEDICINE

## 2022-11-20 PROCEDURE — 83735 ASSAY OF MAGNESIUM: CPT | Performed by: INTERNAL MEDICINE

## 2022-11-20 PROCEDURE — 84100 ASSAY OF PHOSPHORUS: CPT | Performed by: INTERNAL MEDICINE

## 2022-11-20 PROCEDURE — 27000221 HC OXYGEN, UP TO 24 HOURS

## 2022-11-20 PROCEDURE — 94761 N-INVAS EAR/PLS OXIMETRY MLT: CPT

## 2022-11-20 PROCEDURE — 36415 COLL VENOUS BLD VENIPUNCTURE: CPT | Performed by: INTERNAL MEDICINE

## 2022-11-20 PROCEDURE — 25000003 PHARM REV CODE 250

## 2022-11-20 PROCEDURE — 80053 COMPREHEN METABOLIC PANEL: CPT | Performed by: INTERNAL MEDICINE

## 2022-11-20 RX ORDER — FERROUS SULFATE 325(65) MG
325 TABLET, DELAYED RELEASE (ENTERIC COATED) ORAL EVERY OTHER DAY
Qty: 30 TABLET | Refills: 3 | Status: SHIPPED | OUTPATIENT
Start: 2022-11-20

## 2022-11-20 RX ORDER — POTASSIUM CHLORIDE 20 MEQ/1
40 TABLET, EXTENDED RELEASE ORAL ONCE
Status: DISCONTINUED | OUTPATIENT
Start: 2022-11-20 | End: 2022-11-20

## 2022-11-20 RX ORDER — POTASSIUM CHLORIDE 20 MEQ/1
40 TABLET, EXTENDED RELEASE ORAL ONCE
Status: COMPLETED | OUTPATIENT
Start: 2022-11-20 | End: 2022-11-20

## 2022-11-20 RX ORDER — MAGNESIUM SULFATE HEPTAHYDRATE 40 MG/ML
2 INJECTION, SOLUTION INTRAVENOUS ONCE
Status: COMPLETED | OUTPATIENT
Start: 2022-11-20 | End: 2022-11-20

## 2022-11-20 RX ORDER — PANTOPRAZOLE SODIUM 40 MG/1
40 TABLET, DELAYED RELEASE ORAL DAILY
Qty: 30 TABLET | Refills: 3 | Status: SHIPPED | OUTPATIENT
Start: 2022-11-20 | End: 2023-11-20

## 2022-11-20 RX ADMIN — DIGOXIN 0.12 MG: 125 TABLET ORAL at 08:11

## 2022-11-20 RX ADMIN — MAGNESIUM SULFATE HEPTAHYDRATE 2 G: 40 INJECTION, SOLUTION INTRAVENOUS at 07:11

## 2022-11-20 RX ADMIN — PANTOPRAZOLE 40 MG: 40 TABLET, DELAYED RELEASE ORAL at 08:11

## 2022-11-20 RX ADMIN — APIXABAN 2.5 MG: 2.5 TABLET, FILM COATED ORAL at 08:11

## 2022-11-20 RX ADMIN — POTASSIUM CHLORIDE 40 MEQ: 1500 TABLET, EXTENDED RELEASE ORAL at 08:11

## 2022-11-20 NOTE — PLAN OF CARE
Problem: Adult Inpatient Plan of Care  Goal: Plan of Care Review  Outcome: Met  Goal: Patient-Specific Goal (Individualized)  Outcome: Met  Goal: Absence of Hospital-Acquired Illness or Injury  Outcome: Met  Goal: Optimal Comfort and Wellbeing  Outcome: Met  Goal: Readiness for Transition of Care  Outcome: Met     Problem: Eating/Swallowing Impairment  Goal: Optimal Eating/Swallowing without Aspir  Outcome: Met     Problem: Fall Injury Risk  Goal: Absence of Fall and Fall-Related Injury  Outcome: Met     Problem: Physical Therapy  Goal: Physical Therapy Goal  Description: Goals to be met by: discharge     Patient will increase functional independence with mobility by performin. Sit to stand transfer with [place]  2. Gait  x 260 feet with Modified [place] using Rolling Walker/no AD.     Outcome: Met

## 2022-11-20 NOTE — SIGNIFICANT EVENT
2126: notified of HR in the 50s, okay to hold metoprolol for this evening. /65.     Gray Albarado MD  \[facility] [place] [facility]\"" Family Medicine - PGY-1

## 2022-11-20 NOTE — PLAN OF CARE
Problem: Adult Inpatient Plan of Care  Goal: Plan of Care Review  Outcome: Ongoing, Progressing  Goal: Patient-Specific Goal (Individualized)  Outcome: Ongoing, Progressing  Goal: Absence of Hospital-Acquired Illness or Injury  Outcome: Ongoing, Progressing  Goal: Optimal Comfort and Wellbeing  Outcome: Ongoing, Progressing  Goal: Readiness for Transition of Care  Outcome: Ongoing, Progressing     Problem: Eating/Swallowing Impairment  Goal: Optimal Eating/Swallowing without Aspir  Outcome: Ongoing, Progressing     Problem: Fall Injury Risk  Goal: Absence of Fall and Fall-Related Injury  Outcome: Ongoing, Progressing

## 2022-11-20 NOTE — DISCHARGE SUMMARY
"[facility] Discharge Summary    Admitting Physician: Elton Sahu MD  Attending Physician: Elton Sahu MD  Date of Admit: 11/17/2022  Date of Discharge: 11/20/2022    Condition: Good  Outcome: Condition has improved and patient is now ready for discharge.  DISPOSITION: Home or Self Care        Discharge Diagnoses:     Patient Active Problem List   Diagnosis    Primary cancer of right lower lobe of lung    Centrilobular emphysema    Hypertension    Hypercholesterolemia    Neuropathy    Supraventricular tachycardia    Pulmonary emphysema    Diverticulosis    Former smoker    History of osteoporosis    Gastroesophageal reflux disease    Adenocarcinoma of lung    Age related osteoporosis    Anxiety state    Cardiac conduction disorder    Closed fracture of base of fifth metatarsal bone    Gastroparesis    Lung mass    Polyp of colon    Restless legs syndrome    Urinary tract infection    Nausea    Dysphagia    Food bolus obstruction of intestine       Principal Problem:  Food bolus obstruction of intestine    Consultants and Procedures:     Consultants:  IP CONSULT TO INTERNAL MEDICINE  IP CONSULT TO SOCIAL WORK/CASE MANAGEMENT  IP CONSULT TO GI    Procedures:   Procedure(s) (LRB):  EGD, WITH FOREIGN BODY REMOVAL (Left)      Brief Admission History:      Judith Ramirez is a 88 y.o. female who  has a past medical history of COPD (chronic obstructive pulmonary disease), Coronary artery disease, GERD (gastroesophageal reflux disease), hyperlipidemia, and hypertension, lung adenocarcinoma s/p external beam radiation. The patient presented to [facility] ED on 11/17/2022 with a primary complaint of reflux, inability to tolerate PO liquids/solids, and "spitting up". She states that on 11/16, she was eating shrimp and felt like something got caught in her chest. Since then, she has not been able to tolerate liquids or solids by mouth. She denies odynophagia, but states that when she tries to eat or drink, the undigested " "material comes back up almost immediately.  She states also that even when she is not trying to eat, she periodically spits up white foam. She states that approximately one month ago she was hospitalized for a COPD exacerbation and after that hospitalization has been off of omeprazole, which she has taken for several years. She states that she has had "stomach troubles all her life" and previously had part of her stomach resected due to gastric ulcers, unable to recall exactly when, but states that it was several years ago. She is an established patient of Dr. Diaz, has an appointment this Tuesday. She denies chest pain, shortness of breath, wheezing, palpitations, swelling in extremities, melena, bleeding per rectum.     Chart review reveals prior manometry in 2/2019 revealed moderate esophageal dysmotility with no strictures, evidence of prior antrectomy, "ineffective esophageal motility disorder", recommended "small doses of baclofen as tolerated".     In the ED she received zofran and protonix, with little to no relief of her symptoms. Labs were remarkable for microcytic anemia. She had negative troponin x2, CXR was unremarkable, EKG was significant for inferior lateral changes. BNP elevated at 211 but is chronically elevated per chart review. Internal medicine consulted for admission for workup of dysphagia.    Hospital Course with Pertinent Findings:      She had EGD on day 1 of admission which revealed large food bolus but no strictures. After her procedure she felt much relief, was able to tolerate liquids after. Her diet was gradually advanced to soft, bite sized diet which she tolerated well. She was transitioned from IV to PO protonix, fluids were discontinued as patient had better oral intake. During her admission she was also noted to have microcytic anemia with low iron. Denies source of bleeding, H/H remained stable during hospital stay. Will discharge with iron, instructed patient to take it every " "other day. All home meds were resumed, will hold trazadone and hydroxyzine due to patient's age. She has appointment with GI Dr Diaz this week, will follow with pulmonology for COPD management as scheduled.     Discharge physical exam:  Vitals  BP: 130/70  Temp: 97.9 °F (36.6 °C)  Temp src: Oral  Pulse: 62  Resp: 18  SpO2: (!) 92 %  Height: 5' 2" (157.5 cm)  Weight: 59 kg (130 lb 1.1 oz)    General: Patient resting comfortably, in no acute distress . Satting in low 90s on 2L O2 NC  Eye: pupils equal, EOMI, clear conjunctiva, eyelids normal  HENT: Head-normocephalic and atraumatic  Neck: full range of motion, no thyromegaly or lymphadenopathy, trachea midline, supple  Respiratory: clear to auscultation bilaterally without wheezes, rales, rhonchi  Cardiovascular: regular rate and rhythm without murmurs.  No gallops or rubs, no JVD.  Capillary refill within normal limits.  Gastrointestinal: soft, non-tender, non-distended with normal bowel sounds, without masses to palpation  Genitourinary: no CVA tenderness to palpation  Musculoskeletal: full range of motion of all extremities/spine without limitation or discomfort  Integumentary: no rashes or skin lesions present  Neurologic: no signs of peripheral neurological deficit, motor/sensory function intact  Psychiatric:  alert and oriented, cognitive function intact, cooperative with exam      TIME SPENT ON DISCHARGE: 35 minutes    Discharge Medications:         Medication List        START taking these medications      pantoprazole 40 MG tablet  Commonly known as: PROTONIX  Take 1 tablet (40 mg total) by mouth once daily.            CHANGE how you take these medications      LASIX 20 MG tablet  Generic drug: furosemide  What changed: Another medication with the same name was removed. Continue taking this medication, and follow the directions you see here.            CONTINUE taking these medications      albuterol 90 mcg/actuation inhaler  Commonly known as: " PROVENTIL/VENTOLIN HFA  INHALE 2 PUFFS EVERY 6 HOURS     albuterol-ipratropium 2.5 mg-0.5 mg/3 mL nebulizer solution  Commonly known as: DUO-NEB     apixaban 2.5 mg Tab  Commonly known as: ELIQUIS  Take 1 tablet (2.5 mg total) by mouth 2 (two) times daily.     baclofen 5 mg Tab tablet  Commonly known as: LIORESAL     CONTOUR NEXT TEST STRIPS Strp  Generic drug: blood sugar diagnostic     cyanocobalamin 1,000 mcg/mL injection     digoxin 125 mcg tablet  Commonly known as: LANOXIN     famotidine 40 MG tablet  Commonly known as: PEPCID     melatonin  Commonly known as: MELATIN     metoprolol tartrate 25 MG tablet  Commonly known as: LOPRESSOR     mirtazapine 15 MG tablet  Commonly known as: REMERON  Take 1 tablet (15 mg total) by mouth nightly.     polyethylene glycol 17 gram Pwpk  Commonly known as: GLYCOLAX     potassium chloride SA 20 MEQ tablet  Commonly known as: K-DUR,KLOR-CON     pramipexole 0.25 MG tablet  Commonly known as: MIRAPEX     pravastatin 40 MG tablet  Commonly known as: PRAVACHOL     STIOLTO RESPIMAT 2.5-2.5 mcg/actuation Mist  Generic drug: tiotropium-olodateroL     TylenoL 325 MG tablet  Generic drug: acetaminophen            STOP taking these medications      hydrOXYzine pamoate 25 MG Cap  Commonly known as: VISTARIL     omeprazole 20 MG capsule  Commonly known as: PRILOSEC     traZODone 100 MG tablet  Commonly known as: DESYREL               Where to Get Your Medications        These medications were sent to [id number] [address]  [address]  [address] [address]      Phone: [phone]   pantoprazole 40 MG tablet         Discharge Instructions:         Judith Ramirez is being discharged Home or Self Care.    No discharge procedures on file.     Follow-Up Appointments:   Follow-up Information       [facility], [facility] MEDICINE Follow up.    Why: 1 week                               At this time, Judith Ramirez is determined to have  maximized benefits of IP hospitalization. she is discharged in stable condition with OP f/u recommendations and instructions. All questions answered, and patient verbalized agreement with the POC. They were given return precautions prior to d/c including symptoms that should prompt return to ED or to call PCP. Total time spent of DC of 35 minutes.       Zaida Valles, DO  [facility] Internal Medicine  HO-1

## 2022-11-21 ENCOUNTER — PATIENT OUTREACH (OUTPATIENT)
Dept: ADMINISTRATIVE | Facility: CLINIC | Age: 87
End: 2022-11-21
Payer: MEDICARE

## 2022-11-21 DIAGNOSIS — J43.9 PULMONARY EMPHYSEMA, UNSPECIFIED EMPHYSEMA TYPE: Primary | ICD-10-CM

## 2022-11-21 RX ORDER — FUROSEMIDE 40 MG/1
40 TABLET ORAL DAILY
Qty: 30 TABLET | Refills: 1 | Status: SHIPPED | OUTPATIENT
Start: 2022-11-21 | End: 2022-11-22 | Stop reason: SDUPTHER

## 2022-11-21 NOTE — PT/OT/SLP PROGRESS
POST DC DOCUMENTATION    Pt was DC prior to SLP completing CSE. Please feel free to consult via OP services if SLP intervention is warranted.    Fatoumata Seymour MS, CCC-SLP  11/21/2022

## 2022-11-21 NOTE — PROGRESS NOTES
C3 nurse attempted to contact Judith Ramirez   for a TCC post hospital discharge follow up call. No answer at phone number listed.

## 2022-11-21 NOTE — PT/OT/SLP DISCHARGE
Physical Therapy Discharge Summary    Name: Judith Ramirez  MRN: [id number]   Principal Problem: Food bolus obstruction of intestine   Post discharge documentation.   Documenting PT did not evaluate / treat patient but evaluating PT not available to complete discharge summary.     Patient Discharged from acute Physical Therapy on 2022.  Please refer to prior PT noted date on 22 for functional status.     Assessment:     Patient was discharged unexpectedly.  Information required to complete an accurate discharge summary is unknown.  Refer to therapy initial evaluation and last progress note for initial and most recent functional status and goal achievement.  Recommendations made may be found in medical record.    Objective:     GOALS:   Multidisciplinary Problems       Physical Therapy Goals       Not on file              Multidisciplinary Problems (Resolved)          Problem: Physical Therapy    Goal Priority Disciplines Outcome Goal Variances Interventions   Physical Therapy Goal   (Resolved)     PT, PT/OT Not Met eval only     Description: Goals to be met by: discharge     Patient will increase functional independence with mobility by performin. Sit to stand transfer with [place]  2. Gait  x 260 feet with Modified [place] using Rolling Walker/no AD.                          Reasons for Discontinuation of Therapy Services  Transfer to alternate level of care.      Plan:     Patient Discharged to:  home .      2022

## 2022-11-21 NOTE — [employer]ATION PATIENT INSTRUCTIONS
Discharge Summary/Instructions after an Endoscopic Procedure  Patient Name: Judith Ramirez  Patient MRN: [id number]  Patient YOB: 1934  Friday, November 18, 2022  Shannon Flores MD  Dear patient,  As a result of recent federal legislation (The Federal Cures Act), you may   receive lab or pathology results from your procedure in your [facility]   account before your physician is able to contact you. Your physician or   their representative will relay the results to you with their   recommendations at their soonest availability.  Thank you,  RESTRICTIONS:  During your procedure today, you received medications for sedation.  These   medications may affect your judgment, balance and coordination.  Therefore,   for 24 hours, you have the following restrictions:   - DO NOT drive a car, operate machinery, make legal/financial decisions,   sign important papers or drink alcohol.    ACTIVITY:  Today: no heavy lifting, straining or running due to procedural   sedation/anesthesia.  The following day: return to full activity including work.  DIET:  Eat and drink normally unless instructed otherwise.     TREATMENT FOR COMMON SIDE EFFECTS:  - Mild abdominal pain, nausea, belching, bloating or excessive gas:  rest,   eat lightly and use a heating pad.  - Sore Throat: treat with throat lozenges and/or gargle with warm salt   water.  - Because air was used during the procedure, expelling large amounts of air   from your rectum or belching is normal.  - If a bowel prep was taken, you may not have a bowel movement for 1-3 days.    This is normal.  SYMPTOMS TO WATCH FOR AND REPORT TO YOUR PHYSICIAN:  1. Abdominal pain or bloating, other than gas cramps.  2. Chest pain.  3. Back pain.  4. Signs of infection such as: chills or fever occurring within 24 hours   after the procedure.  5. Rectal bleeding, which would show as bright red, maroon, or black stools.   (A tablespoon of blood from the rectum is not serious,  especially if   hemorrhoids are present.)  6. Vomiting.  7. Weakness or dizziness.  GO DIRECTLY TO THE NEAREST EMERGENCY ROOM IF YOU HAVE ANY OF THE FOLLOWING:      Difficulty breathing              Chills and/or fever over 101 F   Persistent vomiting and/or vomiting blood   Severe abdominal pain   Severe chest pain   Black, tarry stools   Bleeding- more than one tablespoon   Any other symptom or condition that you feel may need urgent attention  Your doctor recommends these additional instructions:  If any biopsies were taken, your doctors clinic will contact you in 1 to 2   weeks with any results.  - [facility] patient to [facility] for [facility] care.   - Clear liquid diet today.   - Advance diet as tolerated to soft diet.   - Continue present medications.   - Use Protonix (pantoprazole) 40 mg PO daily.  For questions, problems or results please call your physician - Shannon Flores MD at Work:  [phone].  [facility] , EMERGENCY ROOM PHONE NUMBER: [phone]  IF A COMPLICATION OR EMERGENCY SITUATION ARISES AND YOU ARE UNABLE TO REACH   YOUR PHYSICIAN - GO DIRECTLY TO THE EMERGENCY ROOM.  MD Shannon Bolden MD  11/18/2022 3:56:06 PM  This report has been verified and signed electronically.  Dear patient,  As a result of recent federal legislation (The Federal Cures Act), you may   receive lab or pathology results from your procedure in your [facility]   account before your physician is able to contact you. Your physician or   their representative will relay the results to you with their   recommendations at their soonest availability.  Thank you,  PROVATION

## 2022-11-21 NOTE — PT/OT/SLP PROGRESS
Occupational Therapy      Patient Name:  Judith Ramirez   MRN:  [id number]    Pt d/c prior to OT availability.  No OT eval completed.    11/21/2022

## 2022-11-22 DIAGNOSIS — R13.10 DYSPHAGIA, IDIOPATHIC: Primary | ICD-10-CM

## 2022-11-22 DIAGNOSIS — J43.9 PULMONARY EMPHYSEMA, UNSPECIFIED EMPHYSEMA TYPE: ICD-10-CM

## 2022-11-22 RX ORDER — FUROSEMIDE 40 MG/1
40 TABLET ORAL DAILY
Qty: 90 TABLET | Refills: 3 | Status: SHIPPED | OUTPATIENT
Start: 2022-11-22 | End: 2022-11-23 | Stop reason: SDUPTHER

## 2022-11-22 NOTE — PROGRESS NOTES
C3 nurse attempted to contact Judith Ramirez   for a TCC post hospital discharge follow up call. No answer at phone number

## 2022-11-23 ENCOUNTER — OFFICE VISIT (OUTPATIENT)
Dept: INTERNAL MEDICINE | Facility: CLINIC | Age: 87
End: 2022-11-23
Payer: MEDICARE

## 2022-11-23 VITALS
RESPIRATION RATE: 18 BRPM | DIASTOLIC BLOOD PRESSURE: 72 MMHG | OXYGEN SATURATION: 99 % | BODY MASS INDEX: 23.92 KG/M2 | HEIGHT: 62 IN | SYSTOLIC BLOOD PRESSURE: 102 MMHG | WEIGHT: 130 LBS | HEART RATE: 75 BPM

## 2022-11-23 DIAGNOSIS — Z09 HOSPITAL DISCHARGE FOLLOW-UP: ICD-10-CM

## 2022-11-23 DIAGNOSIS — J43.9 PULMONARY EMPHYSEMA, UNSPECIFIED EMPHYSEMA TYPE: ICD-10-CM

## 2022-11-23 DIAGNOSIS — R13.19 ESOPHAGEAL DYSPHAGIA: Primary | ICD-10-CM

## 2022-11-23 PROCEDURE — 99214 PR OFFICE/OUTPT VISIT, EST, LEVL IV, 30-39 MIN: ICD-10-PCS | Mod: ,,, | Performed by: STUDENT IN AN ORGANIZED HEALTH CARE EDUCATION/TRAINING PROGRAM

## 2022-11-23 PROCEDURE — 99214 OFFICE O/P EST MOD 30 MIN: CPT | Mod: ,,, | Performed by: STUDENT IN AN ORGANIZED HEALTH CARE EDUCATION/TRAINING PROGRAM

## 2022-11-23 RX ORDER — FUROSEMIDE 40 MG/1
40 TABLET ORAL DAILY
Qty: 90 TABLET | Refills: 3 | Status: SHIPPED | OUTPATIENT
Start: 2022-11-23

## 2022-11-23 RX ORDER — APIXABAN 5 MG/1
5 TABLET, FILM COATED ORAL 2 TIMES DAILY
Status: ON HOLD | COMMUNITY
Start: 2022-09-18 | End: 2022-12-13 | Stop reason: HOSPADM

## 2022-11-24 NOTE — ASSESSMENT & PLAN NOTE
Likely due to dysmotility  Follows with GI, will have esophagogram on Monday  Advised patient to continue soft diet and take small bites

## 2022-11-24 NOTE — PROGRESS NOTES
Subjective:      Judith Ramirez  11/23/2022  [id number]      Chief Complaint: TCM       HPI:  Ms Wong presents for hospital discharge follow up. Patient was admitted at [facility] due to dysphagia, found to have food bolus with EGD which was removed. Patient is tolerating soft diet, states continues to feel food getting stuck at times. EGD while hospitalized did not show strictures. Has followed up with GI, will have motility study on Monday.     Past Medical History:   Diagnosis Date    COPD (chronic obstructive pulmonary disease)     Coronary artery disease     GERD (gastroesophageal reflux disease)     Hyperlipidemia     Hypertension      Past Surgical History:   Procedure Laterality Date    CHOLECYSTECTOMY      EYE SURGERY      gum grafting      rotator cuff right      thyroid goiter      total kne left      trigger finger thumb right       History reviewed. No pertinent family history.  Social History     Tobacco Use    Smoking status: Former     Types: Cigarettes    Smokeless tobacco: Never    Tobacco comments:     quit 29 yrs ago   Substance and Sexual Activity    Alcohol use: Not on file    Drug use: Never    Sexual activity: Not on file     Review of patient's allergies indicates:   Allergen Reactions    Aspirin Hives    Aspirin-acetaminophen     Atropine-demerol     Clindamycin Hives    Clindamycin-jagruti per-hyalur na     Darvon compound 32 [propoxyphene-asa-caffeine]     Meperidine Hives    Penicillin Hives    Penicillins     Pentazocine lactate Hives    Propoxyphene Hives    Talwin compound     Tramadol Hives    Codeine Nausea And Vomiting and Other (See Comments)       The following were reviewed at this visit: active problem list, medication list, allergies, family history, social history, and health maintenance.    Medications:    Current Outpatient Medications:     apixaban (ELIQUIS) 2.5 mg Tab, Take by mouth 2 (two) times daily., Disp: , Rfl:     acetaminophen (TYLENOL) 325 MG tablet, Take 650 mg by mouth.,  Disp: , Rfl:     albuterol (PROVENTIL/VENTOLIN HFA) 90 mcg/actuation inhaler, INHALE 2 PUFFS EVERY 6 HOURS, Disp: 120 g, Rfl: 4    albuterol-ipratropium (DUO-NEB) 2.5 mg-0.5 mg/3 mL nebulizer solution, Inhale 3 mLs into the lungs., Disp: , Rfl:     baclofen (LIORESAL) 5 mg Tab tablet, , Disp: , Rfl:     CONTOUR NEXT TEST STRIPS Strp, USE 1 STRIP TO CHECK GLUCOSE ONCE DAILY, Disp: , Rfl:     cyanocobalamin 1,000 mcg/mL injection, , Disp: , Rfl:     digoxin (LANOXIN) 125 mcg tablet, , Disp: , Rfl:     ELIQUIS 5 mg Tab, Take 5 mg by mouth 2 (two) times daily., Disp: , Rfl:     ferrous sulfate 325 (65 FE) MG EC tablet, Take 1 tablet (325 mg total) by mouth every other day., Disp: 30 tablet, Rfl: 3    furosemide (LASIX) 40 MG tablet, Take 1 tablet (40 mg total) by mouth once daily., Disp: 90 tablet, Rfl: 3    melatonin (MELATIN), Take 1 mg by mouth., Disp: , Rfl:     metoprolol tartrate (LOPRESSOR) 25 MG tablet, Take 12.5 mg by mouth., Disp: , Rfl:     mirtazapine (REMERON) 15 MG tablet, Take 1 tablet (15 mg total) by mouth nightly., Disp: 90 tablet, Rfl: 3    pantoprazole (PROTONIX) 40 MG tablet, Take 1 tablet (40 mg total) by mouth once daily., Disp: 30 tablet, Rfl: 3    polyethylene glycol (GLYCOLAX) 17 gram PwPk, Take by mouth., Disp: , Rfl:     potassium chloride SA (K-DUR,KLOR-CON) 20 MEQ tablet, Take 20 mEq by mouth once daily., Disp: , Rfl:     pramipexole (MIRAPEX) 0.25 MG tablet, Take 0.75 mg by mouth., Disp: , Rfl:     pravastatin (PRAVACHOL) 40 MG tablet, Take 40 mg by mouth nightly., Disp: , Rfl:     STIOLTO RESPIMAT 2.5-2.5 mcg/actuation Mist, , Disp: , Rfl:       Medications have been reviewed and reconciled with patient at this visit.  Barriers to medications reviewed with patient.    Adverse reactions to current medications reviewed with patient..    Over the counter medications reviewed and reconciled with patient.  Review of Systems   Constitutional:  Negative for chills, fever, malaise/fatigue and  "weight loss.   HENT:  Negative for congestion, ear discharge, ear pain, hearing loss, sinus pain and sore throat.    Eyes:  Negative for photophobia, pain, discharge and redness.   Respiratory:  Negative for cough, shortness of breath and wheezing.    Cardiovascular:  Negative for chest pain, palpitations and leg swelling.   Gastrointestinal:  Negative for constipation, diarrhea, heartburn, nausea and vomiting.   Genitourinary:  Negative for dysuria, frequency and urgency.   Musculoskeletal:  Negative for falls, joint pain and myalgias.   Skin:  Negative for itching and rash.   Neurological:  Negative for dizziness, focal weakness, weakness and headaches.   Psychiatric/Behavioral:  Negative for depression and memory loss. The patient is not nervous/anxious and does not have insomnia.          Objective:      Vitals:    11/23/22 1306   BP: 102/72   BP Location: Left arm   Pulse: 75   Resp: 18   SpO2: 99%   Weight: 59 kg (130 lb)   Height: 5' 2" (1.575 m)       Physical Exam  Constitutional:       General: She is not in acute distress.     Appearance: Normal appearance.   HENT:      Head: Normocephalic and atraumatic.   Eyes:      Extraocular Movements: Extraocular movements intact.      Pupils: Pupils are equal, round, and reactive to light.   Cardiovascular:      Rate and Rhythm: Normal rate and regular rhythm.      Pulses: Normal pulses.      Heart sounds: Normal heart sounds. No murmur heard.    No friction rub. No gallop.   Pulmonary:      Effort: Pulmonary effort is normal.      Breath sounds: Normal breath sounds. No wheezing, rhonchi or rales.   Abdominal:      General: Abdomen is flat. Bowel sounds are normal. There is no distension.      Palpations: Abdomen is soft.      Tenderness: There is no abdominal tenderness.   Musculoskeletal:         General: No swelling or tenderness. Normal range of motion.      Cervical back: Normal range of motion and neck supple. No tenderness.      Right lower leg: No edema. "      Left lower leg: No edema.   Lymphadenopathy:      Cervical: No cervical adenopathy.   Skin:     Findings: No lesion or rash.   Neurological:      General: No focal deficit present.      Mental Status: She is alert and oriented to person, place, and time.      Cranial Nerves: No cranial nerve deficit.      Sensory: No sensory deficit.      Motor: No weakness.   Psychiatric:         Mood and Affect: Mood normal.         Behavior: Behavior normal.         Thought Content: Thought content normal.             Assessment and Plan:       1. Esophageal dysphagia  Assessment & Plan:  Likely due to dysmotility  Follows with GI, will have esophagogram on Monday  Advised patient to continue soft diet and take small bites       2. Hospital discharge follow-up  Assessment & Plan:  Doing well  Continue to follow with GI       3. Pulmonary emphysema, unspecified emphysema type  -     furosemide (LASIX) 40 MG tablet; Take 1 tablet (40 mg total) by mouth once daily.  Dispense: 90 tablet; Refill: 3          Follow up: No follow-ups on file.

## 2022-11-28 ENCOUNTER — HOSPITAL ENCOUNTER (OUTPATIENT)
Dept: RADIOLOGY | Facility: HOSPITAL | Age: 87
Discharge: HOME | End: 2022-11-28
Attending: NURSE PRACTITIONER
Payer: MEDICARE

## 2022-11-28 DIAGNOSIS — R13.10 DYSPHAGIA, IDIOPATHIC: ICD-10-CM

## 2022-11-28 PROCEDURE — 25500020 PHARM REV CODE 255: Performed by: NURSE PRACTITIONER

## 2022-11-28 PROCEDURE — A9698 NON-RAD CONTRAST MATERIALNOC: HCPCS | Performed by: NURSE PRACTITIONER

## 2022-11-28 PROCEDURE — 74220 X-RAY XM ESOPHAGUS 1CNTRST: CPT | Mod: TC

## 2022-11-28 RX ADMIN — BARIUM SULFATE 100 ML: 0.6 SUSPENSION ORAL at 08:11

## 2022-11-28 RX ADMIN — BARIUM SULFATE 100 ML: 980 POWDER, FOR SUSPENSION ORAL at 08:11

## 2022-12-03 ENCOUNTER — HOSPITAL ENCOUNTER (EMERGENCY)
Facility: HOSPITAL | Age: 87
Discharge: HOME | End: 2022-12-03
Attending: EMERGENCY MEDICINE
Payer: MEDICARE

## 2022-12-03 VITALS
HEART RATE: 72 BPM | HEIGHT: 62 IN | OXYGEN SATURATION: 94 % | DIASTOLIC BLOOD PRESSURE: 77 MMHG | RESPIRATION RATE: 20 BRPM | SYSTOLIC BLOOD PRESSURE: 110 MMHG | WEIGHT: 125 LBS | TEMPERATURE: 98 F | BODY MASS INDEX: 23 KG/M2

## 2022-12-03 DIAGNOSIS — G25.9 EXTRAPYRAMIDAL MOVEMENT DISORDER: Primary | ICD-10-CM

## 2022-12-03 LAB
ANION GAP SERPL CALC-SCNC: 11 MEQ/L
BASOPHILS # BLD AUTO: 0.02 X10(3)/MCL (ref 0–0.2)
BASOPHILS NFR BLD AUTO: 0.3 %
BUN SERPL-MCNC: 14.3 MG/DL (ref 9.8–20.1)
CALCIUM SERPL-MCNC: 8.8 MG/DL (ref 8.4–10.2)
CHLORIDE SERPL-SCNC: 108 MMOL/L (ref 98–107)
CO2 SERPL-SCNC: 24 MMOL/L (ref 23–31)
CREAT SERPL-MCNC: 0.76 MG/DL (ref 0.55–1.02)
CREAT/UREA NIT SERPL: 19
EOSINOPHIL # BLD AUTO: 0.1 X10(3)/MCL (ref 0–0.9)
EOSINOPHIL NFR BLD AUTO: 1.3 %
ERYTHROCYTE [DISTWIDTH] IN BLOOD BY AUTOMATED COUNT: 20.9 % (ref 11.5–17)
GFR SERPLBLD CREATININE-BSD FMLA CKD-EPI: >60 MLS/MIN/1.73/M2
GLUCOSE SERPL-MCNC: 97 MG/DL (ref 82–115)
HCT VFR BLD AUTO: 34.8 % (ref 37–47)
HGB BLD-MCNC: 10.3 GM/DL (ref 12–16)
IMM GRANULOCYTES # BLD AUTO: 0.01 X10(3)/MCL (ref 0–0.04)
IMM GRANULOCYTES NFR BLD AUTO: 0.1 %
LYMPHOCYTES # BLD AUTO: 2.44 X10(3)/MCL (ref 0.6–4.6)
LYMPHOCYTES NFR BLD AUTO: 30.7 %
MAGNESIUM SERPL-MCNC: 2.1 MG/DL (ref 1.6–2.6)
MCH RBC QN AUTO: 24 PG (ref 27–31)
MCHC RBC AUTO-ENTMCNC: 29.6 MG/DL (ref 33–36)
MCV RBC AUTO: 81.1 FL (ref 80–94)
MONOCYTES # BLD AUTO: 0.76 X10(3)/MCL (ref 0.1–1.3)
MONOCYTES NFR BLD AUTO: 9.5 %
NEUTROPHILS # BLD AUTO: 4.6 X10(3)/MCL (ref 2.1–9.2)
NEUTROPHILS NFR BLD AUTO: 58.1 %
PLATELET # BLD AUTO: 315 X10(3)/MCL (ref 130–400)
PMV BLD AUTO: 9.6 FL (ref 7.4–10.4)
POTASSIUM SERPL-SCNC: 4.4 MMOL/L (ref 3.5–5.1)
RBC # BLD AUTO: 4.29 X10(6)/MCL (ref 4.2–5.4)
SODIUM SERPL-SCNC: 143 MMOL/L (ref 136–145)
WBC # SPEC AUTO: 8 X10(3)/MCL (ref 4.5–11.5)

## 2022-12-03 PROCEDURE — 80048 BASIC METABOLIC PNL TOTAL CA: CPT | Performed by: EMERGENCY MEDICINE

## 2022-12-03 PROCEDURE — 83735 ASSAY OF MAGNESIUM: CPT | Performed by: EMERGENCY MEDICINE

## 2022-12-03 PROCEDURE — 82962 GLUCOSE BLOOD TEST: CPT

## 2022-12-03 PROCEDURE — 99283 EMERGENCY DEPT VISIT LOW MDM: CPT

## 2022-12-03 PROCEDURE — 25000003 PHARM REV CODE 250: Performed by: EMERGENCY MEDICINE

## 2022-12-03 PROCEDURE — 85025 COMPLETE CBC W/AUTO DIFF WBC: CPT | Performed by: EMERGENCY MEDICINE

## 2022-12-03 RX ORDER — PROPRANOLOL HYDROCHLORIDE 10 MG/1
10 TABLET ORAL
Status: COMPLETED | OUTPATIENT
Start: 2022-12-03 | End: 2022-12-03

## 2022-12-03 RX ADMIN — PROPRANOLOL HYDROCHLORIDE 10 MG: 10 TABLET ORAL at 04:12

## 2022-12-03 NOTE — ED PROVIDER NOTES
Encounter Date: 12/3/2022       History     Chief Complaint   Patient presents with    Weakness    jerky movements      Pt arrives via [facility] whom states that around 3am pt started to have severe jerky movements that have gotten worse, pt has a hx of COPD is on home 02 at 2L/min.     This 88-year-old female is brought in [facility] with reports that at approximately 3:00 a.m. this morning she began to have severe jerky movements.  These symptoms have progressively gotten worse during the day.  She has a history of restless leg disorder and she is on Mirapex and baclofen.  She does not take antipsychotics but she is on Remeron as well.  She reports hallucinations with Benadryl.     Review of patient's allergies indicates:   Allergen Reactions    Aspirin Hives    Aspirin-acetaminophen     Atropine-demerol     Clindamycin Hives    Clindamycin-jagruti per-hyalur na     Darvon compound 32 [propoxyphene-asa-caffeine]     Meperidine Hives    Penicillin Hives    Penicillins     Pentazocine lactate Hives    Propoxyphene Hives    Talwin compound     Tramadol Hives    Codeine Nausea And Vomiting and Other (See Comments)     Past Medical History:   Diagnosis Date    COPD (chronic obstructive pulmonary disease)     Coronary artery disease     GERD (gastroesophageal reflux disease)     Hyperlipidemia     Hypertension      Past Surgical History:   Procedure Laterality Date    CHOLECYSTECTOMY      EYE SURGERY      gum grafting      rotator cuff right      thyroid goiter      total kne left      trigger finger thumb right       No family history on file.  Social History     Tobacco Use    Smoking status: Former     Types: Cigarettes    Smokeless tobacco: Never    Tobacco comments:     quit 29 yrs ago   Substance Use Topics    Drug use: Never     Review of Systems   Constitutional:  Negative for fever.   HENT:  Negative for sore throat.    Respiratory:  Negative for shortness of breath.     Cardiovascular:  Negative for chest pain.   Gastrointestinal:  Negative for nausea.   Genitourinary:  Negative for dysuria.   Musculoskeletal:  Negative for back pain.   Skin:  Negative for rash.   Neurological:  Negative for weakness.        The patient is having non-rhythmic spasmodic movements of the arms and face.    Hematological:  Does not bruise/bleed easily.     Physical Exam     Initial Vitals [12/03/22 1548]   BP Pulse Resp Temp SpO2   (!) 123/101 (!) 115 (!) 34 98.2 °F (36.8 °C) (!) 94 %      MAP       --         Physical Exam    Nursing note and vitals reviewed.  Constitutional: She appears well-developed and well-nourished.   HENT:   Head: Normocephalic and atraumatic.   Eyes: Conjunctivae and EOM are normal. Pupils are equal, round, and reactive to light.   Neck: Neck supple.   Normal range of motion.  Cardiovascular:  Normal rate, regular rhythm, normal heart sounds and intact distal pulses.           Pulmonary/Chest: Breath sounds normal.   Abdominal: Abdomen is soft. Bowel sounds are normal.   Musculoskeletal:         General: Normal range of motion.      Cervical back: Normal range of motion and neck supple.     Neurological: She is alert and oriented to person, place, and time. She has normal strength.   The patient is having non-rhythmic spasmodic movements of the arms and face.    Skin: Skin is warm and dry. Capillary refill takes less than 2 seconds.   Psychiatric: She has a normal mood and affect. Her behavior is normal. Judgment and thought content normal.       ED Course   Procedures  Labs Reviewed   BASIC METABOLIC PANEL - Abnormal; Notable for the following components:       Result Value    Chloride 108 (*)     All other components within normal limits   CBC WITH DIFFERENTIAL - Abnormal; Notable for the following components:    Hgb 10.3 (*)     Hct 34.8 (*)     MCH 24.0 (*)     MCHC 29.6 (*)     RDW 20.9 (*)     All other components within normal limits   MAGNESIUM - Normal   CBC W/ AUTO  DIFFERENTIAL    Narrative:     The following orders were created for panel order CBC auto differential.  Procedure                               Abnormality         Status                     ---------                               -----------         ------                     CBC with Differential[[id number]]        Abnormal            Final result                 Please view results for these tests on the individual orders.          Imaging Results    None          Medications   propranoloL tablet 10 mg (10 mg Oral Given 12/3/22 1612)                 ED Course as of 12/03/22 1700   Sat Dec 03, 2022   1653 Symptoms are much better after 10 mg propranolol. Will dc and stop mirapex until she can see a neurologist. [GA]   1700 BP and heart rate are both down and she states the discomfort is much better. 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

## 2022-12-04 ENCOUNTER — HOSPITAL ENCOUNTER (INPATIENT)
Facility: HOSPITAL | Age: 87
LOS: 9 days | Discharge: NURSING, REHAB OR LONG-TERM CARE | DRG: 091 | End: 2022-12-13
Attending: EMERGENCY MEDICINE | Admitting: STUDENT IN AN ORGANIZED HEALTH CARE EDUCATION/TRAINING PROGRAM
Payer: MEDICARE

## 2022-12-04 DIAGNOSIS — R07.9 CHEST PAIN: ICD-10-CM

## 2022-12-04 DIAGNOSIS — G93.40 ENCEPHALOPATHY ACUTE: ICD-10-CM

## 2022-12-04 DIAGNOSIS — R13.10 DYSPHAGIA, UNSPECIFIED TYPE: ICD-10-CM

## 2022-12-04 DIAGNOSIS — R25.9 ABNORMAL INVOLUNTARY MOVEMENT: ICD-10-CM

## 2022-12-04 DIAGNOSIS — E87.0 HYPERNATREMIA: ICD-10-CM

## 2022-12-04 DIAGNOSIS — R06.02 SHORTNESS OF BREATH: ICD-10-CM

## 2022-12-04 DIAGNOSIS — I48.20 CHRONIC ATRIAL FIBRILLATION: Primary | ICD-10-CM

## 2022-12-04 DIAGNOSIS — R91.8 PULMONARY INFILTRATE IN RIGHT LUNG ON CHEST X-RAY: ICD-10-CM

## 2022-12-04 DIAGNOSIS — R41.0 DELIRIUM: ICD-10-CM

## 2022-12-04 DIAGNOSIS — R13.19 ESOPHAGEAL DYSPHAGIA: ICD-10-CM

## 2022-12-04 DIAGNOSIS — G24.9 DYSTONIA: ICD-10-CM

## 2022-12-04 LAB
ALBUMIN SERPL-MCNC: 3.3 GM/DL (ref 3.4–4.8)
ALBUMIN/GLOB SERPL: 0.9 RATIO (ref 1.1–2)
ALP SERPL-CCNC: 116 UNIT/L (ref 40–150)
ALT SERPL-CCNC: 16 UNIT/L (ref 0–55)
APPEARANCE UR: CLEAR
AST SERPL-CCNC: 25 UNIT/L (ref 5–34)
BACTERIA #/AREA URNS AUTO: NORMAL /HPF
BASOPHILS # BLD AUTO: 0.05 X10(3)/MCL (ref 0–0.2)
BASOPHILS NFR BLD AUTO: 0.5 %
BILIRUB UR QL STRIP.AUTO: NEGATIVE MG/DL
BILIRUBIN DIRECT+TOT PNL SERPL-MCNC: 0.7 MG/DL
BNP BLD-MCNC: 424.1 PG/ML
BUN SERPL-MCNC: 16.1 MG/DL (ref 9.8–20.1)
CALCIUM SERPL-MCNC: 9.2 MG/DL (ref 8.4–10.2)
CHLORIDE SERPL-SCNC: 107 MMOL/L (ref 98–107)
CO2 SERPL-SCNC: 24 MMOL/L (ref 23–31)
COLOR UR AUTO: YELLOW
CORRECTED TEMPERATURE (PCO2): 38 MMHG (ref 35–45)
CORRECTED TEMPERATURE (PH): 7.4 (ref 7.35–7.45)
CORRECTED TEMPERATURE (PO2): 54 MMHG
CREAT SERPL-MCNC: 0.86 MG/DL (ref 0.55–1.02)
DIGOXIN SERPL-MCNC: 0.8 NG/ML (ref 0.8–2)
EOSINOPHIL # BLD AUTO: 0.09 X10(3)/MCL (ref 0–0.9)
EOSINOPHIL NFR BLD AUTO: 0.9 %
ERYTHROCYTE [DISTWIDTH] IN BLOOD BY AUTOMATED COUNT: 21.2 % (ref 11.5–17)
GFR SERPLBLD CREATININE-BSD FMLA CKD-EPI: >60 MLS/MIN/1.73/M2
GLOBULIN SER-MCNC: 3.5 GM/DL (ref 2.4–3.5)
GLUCOSE SERPL-MCNC: 109 MG/DL (ref 82–115)
GLUCOSE UR QL STRIP.AUTO: NEGATIVE MG/DL
HCO3 UR-SCNC: 23.5 MMOL/L (ref 22–26)
HCT VFR BLD AUTO: 36.7 % (ref 37–47)
HGB BLD-MCNC: 11.2 GM/DL (ref 12–16)
HGB BLD-MCNC: 11.7 G/DL (ref 12–16)
IMM GRANULOCYTES # BLD AUTO: 0.03 X10(3)/MCL (ref 0–0.04)
IMM GRANULOCYTES NFR BLD AUTO: 0.3 %
INR BLD: 1.26 (ref 0–1.3)
KETONES UR QL STRIP.AUTO: NEGATIVE MG/DL
LEUKOCYTE ESTERASE UR QL STRIP.AUTO: NEGATIVE UNIT/L
LYMPHOCYTES # BLD AUTO: 1.59 X10(3)/MCL (ref 0.6–4.6)
LYMPHOCYTES NFR BLD AUTO: 15.7 %
MCH RBC QN AUTO: 24.3 PG (ref 27–31)
MCHC RBC AUTO-ENTMCNC: 30.5 MG/DL (ref 33–36)
MCV RBC AUTO: 79.8 FL (ref 80–94)
MONOCYTES # BLD AUTO: 0.78 X10(3)/MCL (ref 0.1–1.3)
MONOCYTES NFR BLD AUTO: 7.7 %
NEUTROPHILS # BLD AUTO: 7.6 X10(3)/MCL (ref 2.1–9.2)
NEUTROPHILS NFR BLD AUTO: 74.9 %
NITRITE UR QL STRIP.AUTO: NEGATIVE
NRBC BLD AUTO-RTO: 0 %
PCO2 BLDA: 38 MMHG (ref 35–45)
PH SMN: 7.4 [PH] (ref 7.35–7.45)
PH UR STRIP.AUTO: 6.5 [PH]
PLATELET # BLD AUTO: 329 X10(3)/MCL (ref 130–400)
PMV BLD AUTO: 9.3 FL (ref 7.4–10.4)
PO2 BLDA: 54 MMHG
POC BASE DEFICIT: -1.1 MMOL/L (ref -2–2)
POC COHB: 0.9 %
POC IONIZED CALCIUM: 1.12 MMOL/L (ref 1.12–1.23)
POC METHB: 0 % (ref 0.4–1.5)
POC O2HB: 87.3 % (ref 94–97)
POC SATURATED O2: 87.6 %
POC TEMPERATURE: 37 °C
POCT GLUCOSE: 109 MG/DL (ref 70–110)
POTASSIUM BLD-SCNC: 4.5 MMOL/L (ref 3.5–5)
POTASSIUM SERPL-SCNC: 4.3 MMOL/L (ref 3.5–5.1)
PROT SERPL-MCNC: 6.8 GM/DL (ref 5.8–7.6)
PROT UR QL STRIP.AUTO: NEGATIVE MG/DL
PROTHROMBIN TIME: 15.7 SECONDS (ref 12.5–14.5)
RBC # BLD AUTO: 4.6 X10(6)/MCL (ref 4.2–5.4)
RBC #/AREA URNS AUTO: <5 /HPF
RBC UR QL AUTO: NEGATIVE UNIT/L
SODIUM BLD-SCNC: 138 MMOL/L (ref 137–145)
SODIUM SERPL-SCNC: 141 MMOL/L (ref 136–145)
SP GR UR STRIP.AUTO: 1.01 (ref 1–1.03)
SPECIMEN SOURCE: ABNORMAL
SQUAMOUS #/AREA URNS AUTO: <5 /HPF
TROPONIN I SERPL-MCNC: 0.03 NG/ML (ref 0–0.04)
TSH SERPL-ACNC: 2.48 UIU/ML (ref 0.35–4.94)
UROBILINOGEN UR STRIP-ACNC: 0.2 MG/DL
WBC # SPEC AUTO: 10.1 X10(3)/MCL (ref 4.5–11.5)
WBC #/AREA URNS AUTO: <5 /HPF

## 2022-12-04 PROCEDURE — 84484 ASSAY OF TROPONIN QUANT: CPT | Performed by: EMERGENCY MEDICINE

## 2022-12-04 PROCEDURE — 63600175 PHARM REV CODE 636 W HCPCS: Performed by: INTERNAL MEDICINE

## 2022-12-04 PROCEDURE — 96361 HYDRATE IV INFUSION ADD-ON: CPT

## 2022-12-04 PROCEDURE — 85610 PROTHROMBIN TIME: CPT | Performed by: EMERGENCY MEDICINE

## 2022-12-04 PROCEDURE — 96375 TX/PRO/DX INJ NEW DRUG ADDON: CPT

## 2022-12-04 PROCEDURE — 99285 EMERGENCY DEPT VISIT HI MDM: CPT | Mod: 25

## 2022-12-04 PROCEDURE — 99223 PR INITIAL HOSPITAL CARE,LEVL III: ICD-10-PCS | Mod: ,,, | Performed by: INTERNAL MEDICINE

## 2022-12-04 PROCEDURE — 84443 ASSAY THYROID STIM HORMONE: CPT | Performed by: INTERNAL MEDICINE

## 2022-12-04 PROCEDURE — 63600175 PHARM REV CODE 636 W HCPCS

## 2022-12-04 PROCEDURE — 96374 THER/PROPH/DIAG INJ IV PUSH: CPT

## 2022-12-04 PROCEDURE — 36600 WITHDRAWAL OF ARTERIAL BLOOD: CPT

## 2022-12-04 PROCEDURE — 80162 ASSAY OF DIGOXIN TOTAL: CPT | Performed by: INTERNAL MEDICINE

## 2022-12-04 PROCEDURE — 11000001 HC ACUTE MED/SURG PRIVATE ROOM

## 2022-12-04 PROCEDURE — 80053 COMPREHEN METABOLIC PANEL: CPT | Performed by: EMERGENCY MEDICINE

## 2022-12-04 PROCEDURE — 93005 ELECTROCARDIOGRAM TRACING: CPT

## 2022-12-04 PROCEDURE — 27000221 HC OXYGEN, UP TO 24 HOURS

## 2022-12-04 PROCEDURE — 83880 ASSAY OF NATRIURETIC PEPTIDE: CPT | Performed by: EMERGENCY MEDICINE

## 2022-12-04 PROCEDURE — 99223 1ST HOSP IP/OBS HIGH 75: CPT | Mod: ,,, | Performed by: INTERNAL MEDICINE

## 2022-12-04 PROCEDURE — 81001 URINALYSIS AUTO W/SCOPE: CPT | Performed by: EMERGENCY MEDICINE

## 2022-12-04 PROCEDURE — 99900035 HC TECH TIME PER 15 MIN (STAT)

## 2022-12-04 PROCEDURE — 93010 ELECTROCARDIOGRAM REPORT: CPT | Mod: ,,, | Performed by: INTERNAL MEDICINE

## 2022-12-04 PROCEDURE — 25000003 PHARM REV CODE 250: Performed by: EMERGENCY MEDICINE

## 2022-12-04 PROCEDURE — 85025 COMPLETE CBC W/AUTO DIFF WBC: CPT | Performed by: EMERGENCY MEDICINE

## 2022-12-04 PROCEDURE — 82803 BLOOD GASES ANY COMBINATION: CPT

## 2022-12-04 PROCEDURE — 63600175 PHARM REV CODE 636 W HCPCS: Performed by: EMERGENCY MEDICINE

## 2022-12-04 PROCEDURE — 93010 EKG 12-LEAD: ICD-10-PCS | Mod: ,,, | Performed by: INTERNAL MEDICINE

## 2022-12-04 PROCEDURE — 21400001 HC TELEMETRY ROOM

## 2022-12-04 RX ORDER — DIPHENHYDRAMINE HYDROCHLORIDE 50 MG/ML
25 INJECTION INTRAMUSCULAR; INTRAVENOUS
Status: COMPLETED | OUTPATIENT
Start: 2022-12-04 | End: 2022-12-04

## 2022-12-04 RX ORDER — LORAZEPAM 2 MG/ML
INJECTION INTRAMUSCULAR
Status: COMPLETED
Start: 2022-12-04 | End: 2022-12-04

## 2022-12-04 RX ORDER — LEVOFLOXACIN 5 MG/ML
750 INJECTION, SOLUTION INTRAVENOUS ONCE
Status: COMPLETED | OUTPATIENT
Start: 2022-12-04 | End: 2022-12-04

## 2022-12-04 RX ORDER — FENTANYL CITRATE 50 UG/ML
50 INJECTION, SOLUTION INTRAMUSCULAR; INTRAVENOUS
Status: COMPLETED | OUTPATIENT
Start: 2022-12-04 | End: 2022-12-04

## 2022-12-04 RX ORDER — LORAZEPAM 2 MG/ML
1 INJECTION INTRAMUSCULAR
Status: COMPLETED | OUTPATIENT
Start: 2022-12-04 | End: 2022-12-04

## 2022-12-04 RX ORDER — SODIUM CHLORIDE, SODIUM LACTATE, POTASSIUM CHLORIDE, CALCIUM CHLORIDE 600; 310; 30; 20 MG/100ML; MG/100ML; MG/100ML; MG/100ML
INJECTION, SOLUTION INTRAVENOUS CONTINUOUS
Status: DISCONTINUED | OUTPATIENT
Start: 2022-12-04 | End: 2022-12-06

## 2022-12-04 RX ORDER — FENTANYL CITRATE 50 UG/ML
INJECTION, SOLUTION INTRAMUSCULAR; INTRAVENOUS
Status: COMPLETED
Start: 2022-12-04 | End: 2022-12-04

## 2022-12-04 RX ORDER — TALC
6 POWDER (GRAM) TOPICAL NIGHTLY PRN
Status: DISCONTINUED | OUTPATIENT
Start: 2022-12-04 | End: 2022-12-13 | Stop reason: HOSPADM

## 2022-12-04 RX ORDER — DIPHENHYDRAMINE HYDROCHLORIDE 50 MG/ML
INJECTION INTRAMUSCULAR; INTRAVENOUS
Status: COMPLETED
Start: 2022-12-04 | End: 2022-12-04

## 2022-12-04 RX ORDER — ONDANSETRON 2 MG/ML
4 INJECTION INTRAMUSCULAR; INTRAVENOUS EVERY 8 HOURS PRN
Status: DISCONTINUED | OUTPATIENT
Start: 2022-12-04 | End: 2022-12-13 | Stop reason: HOSPADM

## 2022-12-04 RX ORDER — METHOCARBAMOL 100 MG/ML
1000 INJECTION, SOLUTION INTRAMUSCULAR; INTRAVENOUS ONCE
Status: COMPLETED | OUTPATIENT
Start: 2022-12-04 | End: 2022-12-04

## 2022-12-04 RX ORDER — HALOPERIDOL 5 MG/ML
5 INJECTION INTRAMUSCULAR
Status: CANCELLED | OUTPATIENT
Start: 2022-12-04 | End: 2022-12-04

## 2022-12-04 RX ORDER — LEVOFLOXACIN 5 MG/ML
750 INJECTION, SOLUTION INTRAVENOUS ONCE
Status: DISCONTINUED | OUTPATIENT
Start: 2022-12-04 | End: 2022-12-04

## 2022-12-04 RX ORDER — LORAZEPAM 2 MG/ML
1 INJECTION INTRAMUSCULAR EVERY 4 HOURS
Status: DISCONTINUED | OUTPATIENT
Start: 2022-12-04 | End: 2022-12-04

## 2022-12-04 RX ORDER — LORAZEPAM 2 MG/ML
2 INJECTION INTRAMUSCULAR EVERY 4 HOURS PRN
Status: DISCONTINUED | OUTPATIENT
Start: 2022-12-04 | End: 2022-12-05

## 2022-12-04 RX ADMIN — FENTANYL CITRATE 50 MCG: 50 INJECTION, SOLUTION INTRAMUSCULAR; INTRAVENOUS at 05:12

## 2022-12-04 RX ADMIN — ONDANSETRON 4 MG: 2 INJECTION INTRAMUSCULAR; INTRAVENOUS at 04:12

## 2022-12-04 RX ADMIN — LORAZEPAM 1 MG: 2 INJECTION INTRAMUSCULAR at 01:12

## 2022-12-04 RX ADMIN — LORAZEPAM 2 MG: 2 INJECTION INTRAMUSCULAR; INTRAVENOUS at 08:12

## 2022-12-04 RX ADMIN — LORAZEPAM 2 MG: 2 INJECTION INTRAMUSCULAR; INTRAVENOUS at 03:12

## 2022-12-04 RX ADMIN — METHOCARBAMOL 1000 MG: 100 INJECTION INTRAMUSCULAR; INTRAVENOUS at 02:12

## 2022-12-04 RX ADMIN — SODIUM CHLORIDE 500 ML: 9 INJECTION, SOLUTION INTRAVENOUS at 02:12

## 2022-12-04 RX ADMIN — DIPHENHYDRAMINE HYDROCHLORIDE 25 MG: 50 INJECTION INTRAMUSCULAR; INTRAVENOUS at 01:12

## 2022-12-04 RX ADMIN — LORAZEPAM 1 MG: 2 INJECTION INTRAMUSCULAR; INTRAVENOUS at 01:12

## 2022-12-04 RX ADMIN — LORAZEPAM 2 MG: 2 INJECTION INTRAMUSCULAR at 08:12

## 2022-12-04 RX ADMIN — LORAZEPAM 1 MG: 2 INJECTION INTRAMUSCULAR; INTRAVENOUS at 08:12

## 2022-12-04 RX ADMIN — LEVOFLOXACIN 750 MG: 750 INJECTION, SOLUTION INTRAVENOUS at 03:12

## 2022-12-04 RX ADMIN — SODIUM CHLORIDE, POTASSIUM CHLORIDE, SODIUM LACTATE AND CALCIUM CHLORIDE: 600; 310; 30; 20 INJECTION, SOLUTION INTRAVENOUS at 07:12

## 2022-12-04 NOTE — ED NOTES
Dr. Walls at bedside. Explained pt scenario, mentions EEG but unsure if pt will be able to remain still for whole time.

## 2022-12-04 NOTE — ED NOTES
Messaging with Dr. Puente at this time.  Asks for flexeril PO, but cannot pass pt on swallow screen at this time. Dr. Puente notified.

## 2022-12-04 NOTE — ED PROVIDER NOTES
Encounter Date: 12/4/2022    SCRIBE #1 NOTE: I, Zaida Peter, am scribing for, and in the presence of,  Breezy Wise MD. I have scribed the following portions of the note - Other sections scribed: HPI, ROS, PE.     History     Chief Complaint   Patient presents with    Shortness of Breath     [facility]/O SOB and generalized jerking movements x 1 day, worse x 1 hour. Seen at [facility] yesterday for jerking movements. Hx COPD, wears 2L continuous home O2 NC.      88 year old female with history of COPD, CAD, GERD, HLD, and HTN presents to the ED via EMS for SOB and dystonic spasms. Pt is a poor historian and ROS is limited. Pt is on 2 L of oxygen at home and is currently on 5 L here at the ED with oxygen saturation of 95%.  Patient denies any pain related complaints.  Patient's daughter-in-law is present and states that these dystonic type movements started proximally 24 hours ago and have progressed in severity.  She also states patient is somewhat confused.  Patient was seen at an outlying ER earlier today and diagnosed with extrapyramidal side effects.  Daughter-in-law states have been no changes to patient's medications accept baclofen was increased from 5 mg once a day to 10 mg twice a day.  She was also recently started on ferrous sulfate.    The history is provided by a relative. The history is limited by the condition of the patient. No  was used.   Shortness of Breath  This is a new problem. The problem occurs continuously.The current episode started 12 to 24 hours ago. The problem has not changed since onset.  Review of patient's allergies indicates:   Allergen Reactions    Aspirin Hives    Aspirin-acetaminophen     Atropine-demerol     Clindamycin Hives    Clindamycin-jagruti per-hyalur na     Darvon compound 32 [propoxyphene-asa-caffeine]     Meperidine Hives    Penicillin Hives    Penicillins     Pentazocine lactate Hives    Propoxyphene Hives    Talwin compound     Tramadol  Hives    Codeine Nausea And Vomiting and Other (See Comments)     Past Medical History:   Diagnosis Date    COPD (chronic obstructive pulmonary disease)     Coronary artery disease     GERD (gastroesophageal reflux disease)     Hyperlipidemia     Hypertension      Past Surgical History:   Procedure Laterality Date    CHOLECYSTECTOMY      EYE SURGERY      gum grafting      rotator cuff right      thyroid goiter      total kne left      trigger finger thumb right       No family history on file.  Social History     Tobacco Use    Smoking status: Former     Types: Cigarettes    Smokeless tobacco: Never    Tobacco comments:     quit 29 yrs ago   Substance Use Topics    Drug use: Never     Review of Systems   Unable to perform ROS: Acuity of condition     Physical Exam     Initial Vitals [12/04/22 0026]   BP Pulse Resp Temp SpO2   (!) 130/98 97 (!) 28 99.1 °F (37.3 °C) 96 %      MAP       --         Physical Exam    Nursing note and vitals reviewed.  Constitutional: She appears well-developed and well-nourished.   Alert, eyes open   HENT:   Head: Normocephalic and atraumatic.   Right Ear: External ear normal.   Left Ear: External ear normal.   Nose: Nose normal.   Eyes: Conjunctivae and EOM are normal. Pupils are equal, round, and reactive to light. Right eye exhibits no discharge. Left eye exhibits no discharge.   Neck:   Normal range of motion.  Abdominal: Abdomen is soft. She exhibits no distension. There is no abdominal tenderness.   Musculoskeletal:      Cervical back: Normal range of motion.      Comments: No deformities to extremities x 4.      Neurological: She is alert and oriented to person, place, and time.   Answers short questions, limited history. Non-symmetric and non-spasmatic involuntary movements of torso and extremities x 4.    Skin: Skin is dry. Capillary refill takes less than 2 seconds.       ED Course   Procedures  Labs Reviewed   COMPREHENSIVE METABOLIC PANEL - Abnormal; Notable for the following  components:       Result Value    Albumin Level 3.3 (*)     Albumin/Globulin Ratio 0.9 (*)     All other components within normal limits   B-TYPE NATRIURETIC PEPTIDE - Abnormal; Notable for the following components:    Natriuretic Peptide 424.1 (*)     All other components within normal limits   PROTIME-INR - Abnormal; Notable for the following components:    PT 15.7 (*)     All other components within normal limits   CBC WITH DIFFERENTIAL - Abnormal; Notable for the following components:    Hgb 11.2 (*)     Hct 36.7 (*)     MCV 79.8 (*)     MCH 24.3 (*)     MCHC 30.5 (*)     RDW 21.2 (*)     All other components within normal limits   TROPONIN I - Normal   URINALYSIS, REFLEX TO URINE CULTURE - Normal   URINALYSIS, MICROSCOPIC - Normal   CBC W/ AUTO DIFFERENTIAL    Narrative:     The following orders were created for panel order CBC auto differential.  Procedure                               Abnormality         Status                     ---------                               -----------         ------                     CBC with Differential[[id number]]        Abnormal            Final result                 Please view results for these tests on the individual orders.     EKG Readings: (Independently Interpreted)   Initial Reading: No STEMI. Rhythm: Atrial Fibrillation. Heart Rate: 84. Ectopy: No Ectopy. Conduction: RBBB.     Imaging Results              CT Head Without Contrast (Final result)  Result time 12/04/22 08:05:13      Final result by Keyshawn Bennett MD (12/04/22 08:05:13)                   Impression:    Impression:    1. No acute intracranial process identified. Details and other findings as noted above.    Agree with overnight read.  Findings of chronic microvascular ischemic disease.      Electronically signed by: Keyshawn Bennett  Date:    12/04/2022  Time:    08:05               Narrative:    EXAMINATION:  CT HEAD WITHOUT CONTRAST    CLINICAL HISTORY:  Mental status change, unknown  cause;Dystonia;    TECHNIQUE:  Low dose axial images were obtained through the head.  Coronal and sagittal reformations were also performed. Contrast was not administered.    Automatic exposure control was utilized to reduce the patient's radiation dose.    DLP= 13 50    COMPARISON:  None.    FINDINGS:  Hemorrhage: No acute intracranial hemorrhage is seen.    CSF spaces: The ventricles, sulci and basal cisterns all appear mildly prominent consistent with global cerebral atrophy.    Brain parenchyma: Mild microvascular change is seen in portions of the periventricular and deep white matter tracts.    Cerebellum: Unremarkable.    Sella and skull base: The sella appears to be within normal limits for age.    Intracranial calcifications: Incidental note is made of bilateral choroid plexus calcification. Incidental note is made of some pineal region calcification. Incidental note is made of subtle bilateral basal ganglia calcifcation.    Calvarium: No acute linear or depressed skull fracture is seen.    Maxillofacial Structures:    Paranasal sinuses: The visualized paranasal sinuses appear clear with no significant mucoperiosteal thickening or air fluid levels identified.    Orbits: The orbits appear unremarkable.    Zygomatic arches: The zygomatic arches are intact and unremarkable.    Temporal bones and mastoids: The temporal bones and mastoids appear unremarkable.    TMJ: The mandibular condyles appear normally placed with respect to the mandibular fossa.    Visualized upper cervical spine: Note is made of degenerative changes of the visualized upper cervical spine.                        Preliminary result by Keyshawn Bennett MD (12/04/22 02:29:33)                   Narrative:    START OF REPORT:  Technique: CT of the head was performed without intravenous contrast with axial as well as coronal and sagittal images.    Comparison: None.    Dosage Information: Automated Exposure Control was utilized 1350.40  mGy.cm.    Clinical history: Mental status change, unknown cause, Dystonia.    Findings:  Hemorrhage: No acute intracranial hemorrhage is seen.  CSF spaces: The ventricles, sulci and basal cisterns all appear mildly prominent consistent with global cerebral atrophy.  Brain parenchyma: Mild microvascular change is seen in portions of the periventricular and deep white matter tracts.  Cerebellum: Unremarkable.  Sella and skull base: The sella appears to be within normal limits for age.  Intracranial calcifications: Incidental note is made of bilateral choroid plexus calcification. Incidental note is made of some pineal region calcification. Incidental note is made of subtle bilateral basal ganglia calcifcation.  Calvarium: No acute linear or depressed skull fracture is seen.    Maxillofacial Structures:  Paranasal sinuses: The visualized paranasal sinuses appear clear with no significant mucoperiosteal thickening or air fluid levels identified.  Orbits: The orbits appear unremarkable.  Zygomatic arches: The zygomatic arches are intact and unremarkable.  Temporal bones and mastoids: The temporal bones and mastoids appear unremarkable.  TMJ: The mandibular condyles appear normally placed with respect to the mandibular fossa.    Visualized upper cervical spine: Note is made of degenerative changes of the visualized upper cervical spine.      Impression:  1. No acute intracranial process identified. Details and other findings as noted above.                          Preliminary result by Khurram Finney MD (12/04/22 02:29:33)                   Narrative:    START OF REPORT:  Technique: CT of the head was performed without intravenous contrast with axial as well as coronal and sagittal images.    Comparison: None.    Dosage Information: Automated Exposure Control was utilized 1350.40 mGy.cm.    Clinical history: Mental status change, unknown cause, Dystonia.    Findings:  Hemorrhage: No acute intracranial hemorrhage is  seen.  CSF spaces: The ventricles, sulci and basal cisterns all appear mildly prominent consistent with global cerebral atrophy.  Brain parenchyma: Mild microvascular change is seen in portions of the periventricular and deep white matter tracts.  Cerebellum: Unremarkable.  Sella and skull base: The sella appears to be within normal limits for age.  Intracranial calcifications: Incidental note is made of bilateral choroid plexus calcification. Incidental note is made of some pineal region calcification. Incidental note is made of subtle bilateral basal ganglia calcifcation.  Calvarium: No acute linear or depressed skull fracture is seen.    Maxillofacial Structures:  Paranasal sinuses: The visualized paranasal sinuses appear clear with no significant mucoperiosteal thickening or air fluid levels identified.  Orbits: The orbits appear unremarkable.  Zygomatic arches: The zygomatic arches are intact and unremarkable.  Temporal bones and mastoids: The temporal bones and mastoids appear unremarkable.  TMJ: The mandibular condyles appear normally placed with respect to the mandibular fossa.    Visualized upper cervical spine: Note is made of degenerative changes of the visualized upper cervical spine.      Impression:  1. No acute intracranial process identified. Details and other findings as noted above.                                         X-Ray Chest AP Portable (Final result)  Result time 12/04/22 08:19:49      Final result by Keyshawn Bennett MD (12/04/22 08:19:49)                   Impression:      Increasing opacification of the right mid lung zone.  Recommend continued follow-up to exclude worsening process.      Electronically signed by: Keyshawn Bennett  Date:    12/04/2022  Time:    08:19               Narrative:    EXAMINATION:  XR CHEST AP PORTABLE    CLINICAL HISTORY:  shortness of breath;    TECHNIQUE:  Single view of the chest    COMPARISON:  11/17/2022    FINDINGS:  Increasing opacification of the  right mid lung zone.  Prominent interstitial markings with no focal opacification.  No pleural effusion or pneumothorax.  No acute osseous abnormality                                    X-Rays:   Independently Interpreted Readings:   Other Readings:  Right-sided pulmonary infiltrate on chest x-ray  Medications   ondansetron injection 4 mg (4 mg Intravenous Given 12/4/22 0454)   melatonin tablet 6 mg (has no administration in time range)   lactated ringers infusion ( Intravenous New Bag 12/4/22 0725)   LORazepam injection 1 mg (has no administration in time range)   LORazepam injection 1 mg (1 mg Intravenous Given 12/4/22 0100)   LORazepam injection 1 mg (1 mg Intravenous Given 12/4/22 0130)   diphenhydrAMINE injection 25 mg (25 mg Intravenous Given 12/4/22 0130)   sodium chloride 0.9% bolus 500 mL (0 mLs Intravenous Stopped 12/4/22 0334)   levoFLOXacin 750 mg/150 mL IVPB 750 mg (0 mg Intravenous Stopped 12/4/22 0558)   fentaNYL injection 50 mcg (50 mcg Intravenous Given 12/4/22 0500)                Attending Attestation:           Physician Attestation for Scribe:  Physician Attestation Statement for Scribe #1: I, Breezy Wise MD, reviewed documentation, as scribed by Zaida Peter in my presence, and it is both accurate and complete.           ED Course as of 12/04/22 0848   Sun Dec 04, 2022   0217 Patient's blood pressure is now 78/44.  Normal saline 500 cc bolus was ordered.  Patient is sedated from Ativan. [KG]   0315 Patient is currently sleeping, no dystonic movements.  Blood pressure is 109/57. [KG]   0315 Hospitalist paged [KG]   0342 Dr. Darwin Puente on call, OK to admit.  Consult Neurology [KG]   0342 Patient has right-sided pulmonary infiltrate, will start Levaquin. [KG]      ED Course User Index  [KG] Breezy Wies MD                 Clinical Impression:   Final diagnoses:  [R06.02] Shortness of breath  [R91.8] Pulmonary infiltrate in right lung on chest x-ray  [R41.0] Delirium  [R25.9] Abnormal  involuntary movement        ED Disposition Condition    Admit Stable                Breezy Wise MD  12/04/22 0736       Breezy Wise MD  12/05/22 0914

## 2022-12-04 NOTE — ED NOTES
Dr. Puente ordered Valium IV, spoke to pharmacy who states we only have tablets at this time. Dr. Puente notified, and ravi suggested.

## 2022-12-04 NOTE — PROGRESS NOTES
Progress Note  Hospital Medicine    Patient Name: Judith Ramirez  YOB: 1934    Admit Date: 12/4/2022                     LOS: 0    SUBJECTIVE:     Reason for Admission:  Dystonia  See H&P for detailed presentating history and ROS.      Interval history:  Apparently patient was seen in [place] earlier today and was sent home and they brought her back to [facility] due to her involuntary jerking movement all over her body      OBJECTIVE:     Vital Signs Range (Last 24H):  Temp:  [98.2 °F (36.8 °C)-99.1 °F (37.3 °C)]   Pulse:  []   Resp:  [20-34]   BP: ()/()   SpO2:  [93 %-97 %] Body mass index is 22.86 kg/m².  Wt Readings from Last 3 Encounters:   12/04/22 0026 56.7 kg (125 lb)   12/03/22 1548 56.7 kg (125 lb)   11/23/22 1306 59 kg (130 lb)       I & O (Last 24H):  Intake/Output Summary (Last 24 hours) at 12/4/2022 1501  Last data filed at 12/4/2022 0558  Gross per 24 hour   Intake 650 ml   Output --   Net 650 ml       Physical Exam:  Patient was admitted around 4 in the morning, since then been getting plenty of phone calls regarding medical treatment, case has been discussed thoroughly with the ER physician Dr. Wise in unfortunately patient does not follow commands unable to try some medications by mouth and there is no family he is history of this behavior-condition  No other family member that we could obtain to us, according to the nurse watery family members had called and said that this just got like that suddenly for the last 2 days  Essentially unable to examine patient she is kicking and throwing point she is making facial movements and unfortunately unable to obtain any history the heart is tachy   The lungs are clear the rest of the exam is essentially unable to be performed    Diagnostic Results:  Lab Results   Component Value Date    WBC 10.1 12/04/2022    HGB 11.2 (L) 12/04/2022    HCT 36.7 (L) 12/04/2022    MCV 79.8 (L) 12/04/2022     12/04/2022      Recent Labs   Lab 12/03/22  1600 12/04/22  0049    141   K 4.4 4.3   CO2 24 24   BUN 14.3 16.1   CREATININE 0.76 0.86   CALCIUM 8.8 9.2   MG 2.10  --      Lab Results   Component Value Date    INR 1.26 12/04/2022    INR 1.04 03/23/2022    INR 1.3 12/14/2021    PROTIME 15.7 (H) 12/04/2022    PROTIME 10.9 03/23/2022    PROTIME 15.7 (H) 12/14/2021     Lab Results   Component Value Date    HGBA1C 6.2 12/22/2021     No results for input(s): POCTGLUCOSE in the last 72 hours.    ASSESSMENT/PLAN:     Active Hospital Problems    Diagnosis  POA    *Dystonia [G24.9]  Unknown    Delirium [R41.0]  Unknown      Resolved Hospital Problems   No resolved problems to display.        Problems Addressed Today:    No problem-specific Assessment & Plan notes found for this encounter.        DISCHARGE PLANNING:   Neurologic was consult, I have gave orders for Valium apparently the shortage to be used IV Ativan only work shortly when they were able to do the CT scan of the head early in the morning, I was going to try to use some dopa medications like carbidopa levodopa but unfortunately sees unable to follow commands or swallow safely therefore I changed to Robaxin and will use high dose of Ativan just to see we can call for down the next plan of care will be determined by the neurologist    Signing Physician:  Darwin Conklin MD

## 2022-12-04 NOTE — ED NOTES
Called Dr. Walls's office for consult, went to voicemail. Left message with phone number, awaiting call back.

## 2022-12-04 NOTE — ED NOTES
Paged Dr. Puente, the on call for Dr. Andrew Etienne, regarding pt's status. Pt is twisting in bed, unable to be kept still. Pt not answering questions.

## 2022-12-04 NOTE — CONSULTS
[facility]                       [address] [id number]    PATIENT NAME:       VINOD WEEKS   YOB: 1934  CSN:                [id number]   MRN:                [id number]  ADMIT DATE:         12/04/2022 00:33:00  PHYSICIAN:          Augusta Walls MD                            CONSULTATION    DATE OF CONSULT:      HISTORY OF PRESENT ILLNESS:  Apparently, patient is 88 years old, was admitted   to the hospital because she has a lot of jerking movement on the upper and lower   extremity associated with shaking activity, hallucination, and delirium.    Apparently, patient is taking Remeron.  I am not sure for how long.    Unfortunately, I cannot get any history from the patient.  Most of the history I   am getting from the nursing staff in the ER and from the charts.  Apparently,   patient got admitted to [facility] yesterday and was   discharged after she was given propranolol, which helped the symptom to relieve,   but the symptom came back so the patient's daughter brought her back to the   hospital.  I am not sure if the patient has any psychiatric problem in the past,   but the fact that she is on Remeron, maybe she has some depression and anxiety,   so unfortunately the history is not clear to me since nobody in the family is   available to talk to.    PAST MEDICAL HISTORY:  Has been reviewed.    FAMILY HISTORY:  Has been reviewed.    SOCIAL HISTORY:  Has been reviewed.    MEDICATION:  Reviewed.    PHYSICAL EXAMINATION:  NEURO:  Very difficult to obtain since the patient is moving all over.  She has   some shaking activity, upper, lower extremity, and she is staring at the dimple   with yawning and moaning so it was difficult to obtain a good neuro examination.    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  Assuming that this was just started  recently on her.  I am not sure   how long she has been on Remeron or not.  She is known to have restless legs   syndrome in the past too, and she was placed on Mirapex, so I will suggest that   if the patient did not improve with Ativan, maybe to consider giving her   Klonopin at 1 mg every, maybe, 12 hours or 8 hours on as-needed basis.  Consider   doing an EEG when the patient is not shaking a lot.  I am not sure if I can do   the EEG on the patient with her keep moving her head like this.  A CT scan of   the brain came back negative for any acute abnormality.  Stop the Remeron and   then consider also consulting Psychiatry.        ______________________________  MD PRASANTH Acosta/YANIV  DD:  12/04/2022  Time:  02:23PM  DT:  12/04/2022  Time:  02:47PM  Job #:  [id number]      CONSULTATION

## 2022-12-05 PROBLEM — I48.20 CHRONIC ATRIAL FIBRILLATION: Status: ACTIVE | Noted: 2022-12-05

## 2022-12-05 LAB
ANION GAP SERPL CALC-SCNC: 12 MEQ/L
BASOPHILS # BLD AUTO: 0.04 X10(3)/MCL (ref 0–0.2)
BASOPHILS NFR BLD AUTO: 0.4 %
BUN SERPL-MCNC: 17 MG/DL (ref 9.8–20.1)
CALCIUM SERPL-MCNC: 9 MG/DL (ref 8.4–10.2)
CHLORIDE SERPL-SCNC: 113 MMOL/L (ref 98–107)
CO2 SERPL-SCNC: 21 MMOL/L (ref 23–31)
CREAT SERPL-MCNC: 0.8 MG/DL (ref 0.55–1.02)
CREAT/UREA NIT SERPL: 21
EOSINOPHIL # BLD AUTO: 0.06 X10(3)/MCL (ref 0–0.9)
EOSINOPHIL NFR BLD AUTO: 0.6 %
ERYTHROCYTE [DISTWIDTH] IN BLOOD BY AUTOMATED COUNT: 21.9 % (ref 11.5–17)
GFR SERPLBLD CREATININE-BSD FMLA CKD-EPI: >60 MLS/MIN/1.73/M2
GLUCOSE SERPL-MCNC: 78 MG/DL (ref 82–115)
HCT VFR BLD AUTO: 40.8 % (ref 37–47)
HGB BLD-MCNC: 11.7 GM/DL (ref 12–16)
IMM GRANULOCYTES # BLD AUTO: 0.03 X10(3)/MCL (ref 0–0.04)
IMM GRANULOCYTES NFR BLD AUTO: 0.3 %
LYMPHOCYTES # BLD AUTO: 1.84 X10(3)/MCL (ref 0.6–4.6)
LYMPHOCYTES NFR BLD AUTO: 18.9 %
MCH RBC QN AUTO: 24 PG (ref 27–31)
MCHC RBC AUTO-ENTMCNC: 28.7 MG/DL (ref 33–36)
MCV RBC AUTO: 83.6 FL (ref 80–94)
MONOCYTES # BLD AUTO: 0.66 X10(3)/MCL (ref 0.1–1.3)
MONOCYTES NFR BLD AUTO: 6.8 %
NEUTROPHILS # BLD AUTO: 7.1 X10(3)/MCL (ref 2.1–9.2)
NEUTROPHILS NFR BLD AUTO: 73 %
NRBC BLD AUTO-RTO: 0 %
PLATELET # BLD AUTO: 304 X10(3)/MCL (ref 130–400)
PMV BLD AUTO: 9.4 FL (ref 7.4–10.4)
POTASSIUM SERPL-SCNC: 4.2 MMOL/L (ref 3.5–5.1)
RBC # BLD AUTO: 4.88 X10(6)/MCL (ref 4.2–5.4)
SODIUM SERPL-SCNC: 146 MMOL/L (ref 136–145)
WBC # SPEC AUTO: 9.8 X10(3)/MCL (ref 4.5–11.5)

## 2022-12-05 PROCEDURE — 36415 COLL VENOUS BLD VENIPUNCTURE: CPT | Performed by: STUDENT IN AN ORGANIZED HEALTH CARE EDUCATION/TRAINING PROGRAM

## 2022-12-05 PROCEDURE — 95819 PR EEG,W/AWAKE & ASLEEP RECORD: ICD-10-PCS | Mod: 26,,, | Performed by: PSYCHIATRY & NEUROLOGY

## 2022-12-05 PROCEDURE — 93010 ELECTROCARDIOGRAM REPORT: CPT | Mod: ,,, | Performed by: STUDENT IN AN ORGANIZED HEALTH CARE EDUCATION/TRAINING PROGRAM

## 2022-12-05 PROCEDURE — 95819 EEG AWAKE AND ASLEEP: CPT | Mod: 26,,, | Performed by: PSYCHIATRY & NEUROLOGY

## 2022-12-05 PROCEDURE — 99233 PR SUBSEQUENT HOSPITAL CARE,LEVL III: ICD-10-PCS | Mod: ,,, | Performed by: STUDENT IN AN ORGANIZED HEALTH CARE EDUCATION/TRAINING PROGRAM

## 2022-12-05 PROCEDURE — 93005 ELECTROCARDIOGRAM TRACING: CPT

## 2022-12-05 PROCEDURE — 99233 PR SUBSEQUENT HOSPITAL CARE,LEVL III: ICD-10-PCS | Mod: 95,,, | Performed by: PSYCHIATRY & NEUROLOGY

## 2022-12-05 PROCEDURE — 63600175 PHARM REV CODE 636 W HCPCS: Performed by: EMERGENCY MEDICINE

## 2022-12-05 PROCEDURE — 27000221 HC OXYGEN, UP TO 24 HOURS

## 2022-12-05 PROCEDURE — 95819 EEG AWAKE AND ASLEEP: CPT

## 2022-12-05 PROCEDURE — 80048 BASIC METABOLIC PNL TOTAL CA: CPT | Performed by: STUDENT IN AN ORGANIZED HEALTH CARE EDUCATION/TRAINING PROGRAM

## 2022-12-05 PROCEDURE — 99233 SBSQ HOSP IP/OBS HIGH 50: CPT | Mod: 95,,, | Performed by: PSYCHIATRY & NEUROLOGY

## 2022-12-05 PROCEDURE — 93010 EKG 12-LEAD: ICD-10-PCS | Mod: ,,, | Performed by: STUDENT IN AN ORGANIZED HEALTH CARE EDUCATION/TRAINING PROGRAM

## 2022-12-05 PROCEDURE — 85025 COMPLETE CBC W/AUTO DIFF WBC: CPT | Performed by: STUDENT IN AN ORGANIZED HEALTH CARE EDUCATION/TRAINING PROGRAM

## 2022-12-05 PROCEDURE — 99233 SBSQ HOSP IP/OBS HIGH 50: CPT | Mod: ,,, | Performed by: STUDENT IN AN ORGANIZED HEALTH CARE EDUCATION/TRAINING PROGRAM

## 2022-12-05 PROCEDURE — 25500020 PHARM REV CODE 255: Performed by: STUDENT IN AN ORGANIZED HEALTH CARE EDUCATION/TRAINING PROGRAM

## 2022-12-05 PROCEDURE — 21400001 HC TELEMETRY ROOM

## 2022-12-05 RX ORDER — FLUMAZENIL 0.1 MG/ML
0.2 INJECTION INTRAVENOUS ONCE
Status: DISCONTINUED | OUTPATIENT
Start: 2022-12-05 | End: 2022-12-06

## 2022-12-05 RX ADMIN — IOPAMIDOL 100 ML: 755 INJECTION, SOLUTION INTRAVENOUS at 02:12

## 2022-12-05 RX ADMIN — SODIUM CHLORIDE, POTASSIUM CHLORIDE, SODIUM LACTATE AND CALCIUM CHLORIDE: 600; 310; 30; 20 INJECTION, SOLUTION INTRAVENOUS at 08:12

## 2022-12-05 NOTE — PROGRESS NOTES
"S:   Pt lying in bed GCS of 7, stopped breathing for brief moment, RRT was called.      O:     /70   Pulse 71   Temp 96.7 °F (35.9 °C) (Axillary)   Resp 18   Ht 5' 2" (1.575 m)   Wt 56.7 kg (125 lb)   SpO2 100%   BMI 22.86 kg/m²     Pt is sleeping, does not wake up to sternal rub, only grimaces and moans.   Does withdrawal the leg slightly from painful stimuli and grimaces.    No verbal response but moaning.   No focal weakness, eyes are conjugate, but pintpoint, no face droop.     A/p:   Unclear etiology of here profound decreased level of unconscious but ddx includes medication side effect (fentanyl, ativan, benadryl and methocarbamol given through the day on 12/04), basilar artery stroke or status/post ictal.   May need narcan and or flumazenil, CTA head and Eeg stat are ordered.   -Neurology initially was consulted for jerking mvmn, pt was   - will continue to follow    --------------------  19:10:   CTA no basilar stroke, EEG slowing no sz, pt clinically is getting better according to Nursing, most likely medciation side effect, hold of on flumazenil, discussed with nursing staff.   Ok to keep the keppra on board for now, we reassess in the AM.         "

## 2022-12-05 NOTE — PROCEDURES
EEG    Dx: Altered level of consciousness    Duration: 28 minutes    Technical: Standard digital EEG was performed at [facility]. The 10/20 international system of electrode placement was used.  Photic   stimulation and hyperventilation were not performed as activating   procedures.    Description: The posterior dominant rhythm was 6 Hz.  There   were no lateralizing features.  The patient achieved stage 2 sleep.  There were   no epileptiform discharges or clinical seizures seen.    Impression: Moderate background slowing.

## 2022-12-05 NOTE — PLAN OF CARE
12/05/22 1103   Discharge Assessment   Assessment Type Discharge Planning Assessment   Confirmed/corrected address, phone number and insurance Yes   Confirmed Demographics Correct on Facesheet   Source of Information family   Does patient/caregiver understand observation status Yes   Communicated DEYANIRA with patient/caregiver Yes;Date not available/Unable to determine   Lives With alone   Prior to hospitilization cognitive status: Unable to Assess   Current cognitive status: Unable to Assess   Walking or Climbing Stairs Difficulty ambulation difficulty, requires equipment   Dressing/Bathing Difficulty none   Equipment Currently Used at Home walker, rolling;wheelchair;cane, straight   Readmission within 30 days? Yes   Do you currently have service(s) that help you manage your care at home? No   Do you take prescription medications? Yes   Do you have prescription coverage? Yes   Who is going to help you get home at discharge? Family will take home.   How do you get to doctors appointments? family or friend will provide   Are you on dialysis? No   Do you take coumadin? Yes   Discharge Plan A Home Health   Discharge Plan B Home Health   Discharge Plan discussed with: Sibling

## 2022-12-05 NOTE — CONSULTS
Not full consult note.  This note is to link the consult order.  See full consult note per Dr. Walls.    Ava Cheema, [facility]  Inpatient Neurology

## 2022-12-06 PROBLEM — E87.0 HYPERNATREMIA: Status: ACTIVE | Noted: 2022-12-06

## 2022-12-06 PROBLEM — G93.40 ENCEPHALOPATHY ACUTE: Status: ACTIVE | Noted: 2022-12-06

## 2022-12-06 LAB
ANION GAP SERPL CALC-SCNC: 17 MEQ/L
BASOPHILS # BLD AUTO: 0.04 X10(3)/MCL (ref 0–0.2)
BASOPHILS NFR BLD AUTO: 0.4 %
BUN SERPL-MCNC: 14.7 MG/DL (ref 9.8–20.1)
CALCIUM SERPL-MCNC: 9 MG/DL (ref 8.4–10.2)
CHLORIDE SERPL-SCNC: 114 MMOL/L (ref 98–107)
CO2 SERPL-SCNC: 19 MMOL/L (ref 23–31)
CREAT SERPL-MCNC: 0.85 MG/DL (ref 0.55–1.02)
CREAT/UREA NIT SERPL: 17
EOSINOPHIL # BLD AUTO: 0.04 X10(3)/MCL (ref 0–0.9)
EOSINOPHIL NFR BLD AUTO: 0.4 %
ERYTHROCYTE [DISTWIDTH] IN BLOOD BY AUTOMATED COUNT: 22.1 % (ref 11.5–17)
GFR SERPLBLD CREATININE-BSD FMLA CKD-EPI: >60 MLS/MIN/1.73/M2
GLUCOSE SERPL-MCNC: 91 MG/DL (ref 82–115)
HCT VFR BLD AUTO: 34.7 % (ref 37–47)
HGB BLD-MCNC: 10.3 GM/DL (ref 12–16)
IMM GRANULOCYTES # BLD AUTO: 0.03 X10(3)/MCL (ref 0–0.04)
IMM GRANULOCYTES NFR BLD AUTO: 0.3 %
LYMPHOCYTES # BLD AUTO: 1.72 X10(3)/MCL (ref 0.6–4.6)
LYMPHOCYTES NFR BLD AUTO: 16.7 %
MCH RBC QN AUTO: 24.1 PG (ref 27–31)
MCHC RBC AUTO-ENTMCNC: 29.7 MG/DL (ref 33–36)
MCV RBC AUTO: 81.3 FL (ref 80–94)
MONOCYTES # BLD AUTO: 0.79 X10(3)/MCL (ref 0.1–1.3)
MONOCYTES NFR BLD AUTO: 7.7 %
NEUTROPHILS # BLD AUTO: 7.7 X10(3)/MCL (ref 2.1–9.2)
NEUTROPHILS NFR BLD AUTO: 74.5 %
NRBC BLD AUTO-RTO: 0 %
PLATELET # BLD AUTO: 314 X10(3)/MCL (ref 130–400)
PLATELET # BLD EST: NORMAL 10*3/UL
PMV BLD AUTO: 9.1 FL (ref 7.4–10.4)
POCT GLUCOSE: 70 MG/DL (ref 70–110)
POIKILOCYTOSIS BLD QL SMEAR: ABNORMAL
POTASSIUM SERPL-SCNC: 3.5 MMOL/L (ref 3.5–5.1)
RBC # BLD AUTO: 4.27 X10(6)/MCL (ref 4.2–5.4)
RBC MORPH BLD: ABNORMAL
SODIUM SERPL-SCNC: 150 MMOL/L (ref 136–145)
WBC # SPEC AUTO: 10.3 X10(3)/MCL (ref 4.5–11.5)

## 2022-12-06 PROCEDURE — 85025 COMPLETE CBC W/AUTO DIFF WBC: CPT | Performed by: STUDENT IN AN ORGANIZED HEALTH CARE EDUCATION/TRAINING PROGRAM

## 2022-12-06 PROCEDURE — 99233 SBSQ HOSP IP/OBS HIGH 50: CPT | Mod: 95,,, | Performed by: PSYCHIATRY & NEUROLOGY

## 2022-12-06 PROCEDURE — 27000221 HC OXYGEN, UP TO 24 HOURS

## 2022-12-06 PROCEDURE — S5010 5% DEXTROSE AND 0.45% SALINE: HCPCS | Performed by: STUDENT IN AN ORGANIZED HEALTH CARE EDUCATION/TRAINING PROGRAM

## 2022-12-06 PROCEDURE — 99233 PR SUBSEQUENT HOSPITAL CARE,LEVL III: ICD-10-PCS | Mod: ,,, | Performed by: STUDENT IN AN ORGANIZED HEALTH CARE EDUCATION/TRAINING PROGRAM

## 2022-12-06 PROCEDURE — 99233 PR SUBSEQUENT HOSPITAL CARE,LEVL III: ICD-10-PCS | Mod: 95,,, | Performed by: PSYCHIATRY & NEUROLOGY

## 2022-12-06 PROCEDURE — 21400001 HC TELEMETRY ROOM

## 2022-12-06 PROCEDURE — 63600175 PHARM REV CODE 636 W HCPCS: Performed by: PSYCHIATRY & NEUROLOGY

## 2022-12-06 PROCEDURE — 99233 SBSQ HOSP IP/OBS HIGH 50: CPT | Mod: ,,, | Performed by: STUDENT IN AN ORGANIZED HEALTH CARE EDUCATION/TRAINING PROGRAM

## 2022-12-06 PROCEDURE — 36415 COLL VENOUS BLD VENIPUNCTURE: CPT | Performed by: STUDENT IN AN ORGANIZED HEALTH CARE EDUCATION/TRAINING PROGRAM

## 2022-12-06 PROCEDURE — 25000003 PHARM REV CODE 250: Performed by: STUDENT IN AN ORGANIZED HEALTH CARE EDUCATION/TRAINING PROGRAM

## 2022-12-06 PROCEDURE — 80048 BASIC METABOLIC PNL TOTAL CA: CPT | Performed by: STUDENT IN AN ORGANIZED HEALTH CARE EDUCATION/TRAINING PROGRAM

## 2022-12-06 PROCEDURE — 92610 EVALUATE SWALLOWING FUNCTION: CPT

## 2022-12-06 RX ORDER — DEXTROSE MONOHYDRATE AND SODIUM CHLORIDE 5; .9 G/100ML; G/100ML
INJECTION, SOLUTION INTRAVENOUS CONTINUOUS
Status: DISCONTINUED | OUTPATIENT
Start: 2022-12-06 | End: 2022-12-06

## 2022-12-06 RX ORDER — ENOXAPARIN SODIUM 100 MG/ML
1 INJECTION SUBCUTANEOUS
Status: DISCONTINUED | OUTPATIENT
Start: 2022-12-06 | End: 2022-12-13 | Stop reason: HOSPADM

## 2022-12-06 RX ORDER — DEXTROSE MONOHYDRATE AND SODIUM CHLORIDE 5; .45 G/100ML; G/100ML
INJECTION, SOLUTION INTRAVENOUS CONTINUOUS
Status: DISCONTINUED | OUTPATIENT
Start: 2022-12-06 | End: 2022-12-10

## 2022-12-06 RX ADMIN — DEXTROSE AND SODIUM CHLORIDE: 5; 450 INJECTION, SOLUTION INTRAVENOUS at 08:12

## 2022-12-06 RX ADMIN — ENOXAPARIN SODIUM 60 MG: 80 INJECTION SUBCUTANEOUS at 05:12

## 2022-12-06 NOTE — ASSESSMENT & PLAN NOTE
Still present at times but mostly lethargic  Neurology following  Possible side effect to medication  CT and  CTA head negative   EEG done today

## 2022-12-06 NOTE — PT/OT/SLP EVAL
Speech Language Pathology Department  Clinical Swallow Evaluation    Patient Name:  Judith Ramirez   MRN:  [id number]  Admitting Diagnosis: Dystonia    Recommendations:     General recommendations:  Modified Barium Swallow Study when mental status improved  Diet recommendations:  NPO, Liquid Diet Level: NPO   Swallow strategies/precautions: medications crushed in puree  Precautions: Standard, aspiration    History:     Past Medical History:   Diagnosis Date    COPD (chronic obstructive pulmonary disease)     Coronary artery disease     GERD (gastroesophageal reflux disease)     Hyperlipidemia     Hypertension      Recent esophagram revealed severe esophageal dysmotility.    Past Surgical History:   Procedure Laterality Date    CHOLECYSTECTOMY      EYE SURGERY      gum grafting      rotator cuff right      thyroid goiter      total kne left      trigger finger thumb right       Chest X-Rays: 12/6 Improving changes of pulmonary vascular congestion and cardiac decompensation with decreased prominence of the interstitial markings.  Persistent changes with some thickening of the minor fissure in some atelectatic/fibrotic changes in the right mid lung zone.  No other change  Electronically signed by: Neil Izquierdo    Home Diet: Puree and thin liquids  Current Method of Nutrition: NPO    Subjective     Patient  with fluctuating mental status .    Patient goals: to eat/drink     Spiritual/Cultural/Shinto Beliefs/Practices that affect care: no    Pain/Comfort: Pain Rating 1: 0/10    Respiratory Status: nasal cannula    Objective:     Oral Musculature Evaluation  Oral Musculature: general weakness  Dentition: edentulous  Mucosal Quality: dry  Voice Prior to PO Intake: hoarse    Consistency Fed By Oral Symptoms Pharyngeal Symptoms   Ice chips SLP Decreased lip closure  Slowed oral transit time  Bolus holding Reduced laryngeal movement to palpation  Multiple swallows  Cough after swallow   Puree SLP Decreased lip  closure  Slowed oral transit time  Lingual residue Reduced laryngeal movement to palpation  Multiple swallows     Assessment:     Pt presents with signs/sx oropharyngeal dysphagia warranting comprehensive assessment of swallow function to determine safety of PO intake.    Patient Education:     Patient's family  provided with verbal education regarding SLP POC, swallow function and risks of aspiration.  Understanding was verbalized.    Plan:     Patient to be seen:  daily   Plan of Care reviewed with:  patient, daughter, family   SLP Follow-Up:  Yes      Time Tracking:     SLP Treatment Date:   12/06/22  Speech Start Time:  1310  Speech Stop Time:  1325     Speech Total Time (min):  15 min    Billable minutes:  Swallow and Oral Function Evaluation, 15 minutes      12/06/2022

## 2022-12-06 NOTE — HPI
Ms Wong is a 89 y/o female patient with past medical history significant for hypertension, hyperlipidemia, atrial fibrillation, COPD, restless leg syndrome who presented to ED Friday night due to uncontrolled movements and agitation. History is obtained from son since patient is sedated at the moment of exam. Son states they first started noticing symptoms Friday afternoon. They brought patient to ED in [place] where she was given IV fluids and pramipexole was discontinued due to possible side effect of this medication. Son states patient was discharged from ED and a few hours later continued to present same symptoms therefore they brought patient to ED at [facility] [place]. In ED patient was very agitated and presented with hallucinations. Son states the only change in medications is an increase in baclofen dose.

## 2022-12-06 NOTE — PROGRESS NOTES
[facility] - 3rd [facility] Medicine  Progress Note    Patient Name: Judith Ramirez  MRN: [id number]  Patient Class: IP- Inpatient   Admission Date: 12/4/2022  Length of Stay: 1 days  Attending Physician: Allie Etienne MD  Primary Care Provider: Allie Baer MD        Subjective:     Principal Problem:Dystonia        HPI:  Ms Wong is a 87 y/o female patient with past medical history significant for hypertension, hyperlipidemia, atrial fibrillation, COPD, restless leg syndrome who presented to ED Friday night due to uncontrolled movements and agitation. History is obtained from son since patient is sedated at the moment of exam. Son states they first started noticing symptoms Friday afternoon. They brought patient to ED in [place] where she was given IV fluids and pramipexole was discontinued due to possible side effect of this medication. Son states patient was discharged from ED and a few hours later continued to present same symptoms therefore they brought patient to ED at [facility] [place]. In ED patient was very agitated and presented with hallucinations. Son states the only change in medications is an increase in baclofen dose. Patient continued to be agitated since admission until this morning where she had an acute change, has been sedated, patient has received several doses of Ativan as well fentanyl x1, Benadryl and Robaxin.       Overview/Hospital Course:  No notes on file    Interval History: Patient had an cute change this morning, rapid response was called and patient was taken to ED. Patient is sedated but able to respond when calling her name and doing sternal rub, opens eyes on command, follows commands.     ROS  Unable to obtain since patient is sedated     Objective:     Vital Signs (Most Recent):  Temp: 98.1 °F (36.7 °C) (12/05/22 1609)  Pulse: 65 (12/05/22 1609)  Resp: 18 (12/05/22 1609)  BP: 127/73 (12/05/22 1609)  SpO2: 99 % (12/05/22 1609) Vital  Signs (24h Range):  Temp:  [96.7 °F (35.9 °C)-98.1 °F (36.7 °C)] 98.1 °F (36.7 °C)  Pulse:  [65-92] 65  Resp:  [18] 18  SpO2:  [98 %-100 %] 99 %  BP: (111-145)/(63-74) 127/73     Weight: 56.7 kg (125 lb)  Body mass index is 22.86 kg/m².  No intake or output data in the 24 hours ending 12/05/22 1828     Physical Exam  Constitutional:       General: She is not in acute distress.     Appearance: Normal appearance.   HENT:      Head: Normocephalic and atraumatic.   Eyes:      Extraocular Movements: Extraocular movements intact.      Pupils: Pupils are equal, round, and reactive to light.   Cardiovascular:      Rate and Rhythm: Normal rate and regular rhythm.      Pulses: Normal pulses.      Heart sounds: Normal heart sounds. No murmur heard.    No friction rub. No gallop.   Pulmonary:      Effort: Pulmonary effort is normal.      Breath sounds: Normal breath sounds. No wheezing, rhonchi or rales.   Abdominal:      General: Abdomen is flat. Bowel sounds are normal. There is no distension.      Palpations: Abdomen is soft.      Tenderness: There is no abdominal tenderness.   Musculoskeletal:         General: No swelling or tenderness. Normal range of motion.      Cervical back: Normal range of motion and neck supple. No tenderness.      Right lower leg: No edema.      Left lower leg: No edema.   Lymphadenopathy:      Cervical: No cervical adenopathy.   Skin:     Findings: No lesion or rash.   Neurological:      General: No focal deficit present.      Mental Status: She is lethargic and disoriented.      Cranial Nerves: No cranial nerve deficit.      Sensory: No sensory deficit.      Motor: No weakness.      Comments: Follows commands, opens eyes, squeezes hand        Significant Labs: All pertinent labs within the past 24 hours have been reviewed.    Significant Imaging: I have reviewed all pertinent imaging results/findings within the past 24 hours.      Assessment/Plan:      * Dystonia  Still present at times but mostly  lethargic  Neurology following  Possible side effect to medication  CT and  CTA head negative   EEG done today       Chronic atrial fibrillation  Rate controlled  Unable to restart medications due to decreased level of consciousness       Hypertension  Unable to restart PO meds  Blood pressure remains controlled     VTE Risk Mitigation (From admission, onward)         Ordered     Place TATIANA hose  Until discontinued         12/04/22 0358     IP VTE LOW RISK PATIENT  Once         12/04/22 0358                Discharge Planning   DEYANIRA:      Code Status: Full Code   Is the patient medically ready for discharge?:     Reason for patient still in hospital (select all that apply): Treatment  Discharge Plan A: Home Health                  Allie Baer MD  Department of Hospital Medicine   [facility] - 3rd Floor Medical Telemetry

## 2022-12-06 NOTE — NURSING
Nurses Note -- 4 Eyes      12/6/2022   11:39 AM      Skin assessed during: Admit      [x] No Pressure Injuries Present    []Prevention Measures Documented      [] Yes- Altered Skin Integrity Present or Discovered   [] LDA Added if Not in Epic (Describe Wound)   [] New Altered Skin Integrity was Present on Admit and Documented in LDA   [] Wound Image Taken    Wound Care Consulted? No    Attending Nurse:  Kwabena Sorensen RN     Second RN/Staff Member:  Yoanna Renteria

## 2022-12-06 NOTE — SUBJECTIVE & OBJECTIVE
Interval History: Patient had an cute change this morning, rapid response was called and patient was taken to ED. Patient is sedated but able to respond when calling her name and doing sternal rub, opens eyes on command, follows commands.     ROS  Unable to obtain since patient is sedated     Objective:     Vital Signs (Most Recent):  Temp: 98.1 °F (36.7 °C) (12/05/22 1609)  Pulse: 65 (12/05/22 1609)  Resp: 18 (12/05/22 1609)  BP: 127/73 (12/05/22 1609)  SpO2: 99 % (12/05/22 1609) Vital Signs (24h Range):  Temp:  [96.7 °F (35.9 °C)-98.1 °F (36.7 °C)] 98.1 °F (36.7 °C)  Pulse:  [65-92] 65  Resp:  [18] 18  SpO2:  [98 %-100 %] 99 %  BP: (111-145)/(63-74) 127/73     Weight: 56.7 kg (125 lb)  Body mass index is 22.86 kg/m².  No intake or output data in the 24 hours ending 12/05/22 1828     Physical Exam  Constitutional:       General: She is not in acute distress.     Appearance: Normal appearance.   HENT:      Head: Normocephalic and atraumatic.   Eyes:      Extraocular Movements: Extraocular movements intact.      Pupils: Pupils are equal, round, and reactive to light.   Cardiovascular:      Rate and Rhythm: Normal rate and regular rhythm.      Pulses: Normal pulses.      Heart sounds: Normal heart sounds. No murmur heard.    No friction rub. No gallop.   Pulmonary:      Effort: Pulmonary effort is normal.      Breath sounds: Normal breath sounds. No wheezing, rhonchi or rales.   Abdominal:      General: Abdomen is flat. Bowel sounds are normal. There is no distension.      Palpations: Abdomen is soft.      Tenderness: There is no abdominal tenderness.   Musculoskeletal:         General: No swelling or tenderness. Normal range of motion.      Cervical back: Normal range of motion and neck supple. No tenderness.      Right lower leg: No edema.      Left lower leg: No edema.   Lymphadenopathy:      Cervical: No cervical adenopathy.   Skin:     Findings: No lesion or rash.   Neurological:      General: No focal deficit  present.      Mental Status: She is lethargic and disoriented.      Cranial Nerves: No cranial nerve deficit.      Sensory: No sensory deficit.      Motor: No weakness.      Comments: Follows commands, opens eyes, squeezes hand        Significant Labs: All pertinent labs within the past 24 hours have been reviewed.    Significant Imaging: I have reviewed all pertinent imaging results/findings within the past 24 hours.

## 2022-12-06 NOTE — PROGRESS NOTES
"S: much more awake then yesterday.     O:   BP (!) 149/71   Pulse 72   Temp 98.2 °F (36.8 °C) (Oral)   Resp 16   Ht 5' 2" (1.575 m)   Wt 56.7 kg (125 lb)   SpO2 99%   BMI 22.86 kg/m² .  Sleepy, but open eye to verbal stimuli, makes eye contact and tracks, blinks to threat from both sides, does also verbally respond to simple question, but falls back a sleep.   Generally weak but nothing focal.     A/p:   Pt is less encephalopathic today.   Continue on holding centrally acting medication.   - I will definitely hold of home pramipexole because that cause hyperkinesia.   - I am concerned about her Eliquis and baclofen as not taking them can cause strokes and baclofen withdrawal.   - I believe we should anticoagulate her parenterally lovenox 50 BID, for her afib, but I will wait for few days and hope that she will be able to take PO and restart her baclofen.   - I will also hold off on Remeron on short term, I will let her decide with her neurologist about taking it again as outpatient.  - if not improving to baseline over the next 2 days, will get an MRI brain limited.        "

## 2022-12-07 LAB
ANION GAP SERPL CALC-SCNC: 10 MEQ/L
BUN SERPL-MCNC: 13.9 MG/DL (ref 9.8–20.1)
CALCIUM SERPL-MCNC: 8.8 MG/DL (ref 8.4–10.2)
CHLORIDE SERPL-SCNC: 115 MMOL/L (ref 98–107)
CO2 SERPL-SCNC: 24 MMOL/L (ref 23–31)
CREAT SERPL-MCNC: 0.81 MG/DL (ref 0.55–1.02)
CREAT/UREA NIT SERPL: 17
GFR SERPLBLD CREATININE-BSD FMLA CKD-EPI: >60 MLS/MIN/1.73/M2
GLUCOSE SERPL-MCNC: 123 MG/DL (ref 82–115)
POTASSIUM SERPL-SCNC: 3.7 MMOL/L (ref 3.5–5.1)
SODIUM SERPL-SCNC: 149 MMOL/L (ref 136–145)

## 2022-12-07 PROCEDURE — 27000221 HC OXYGEN, UP TO 24 HOURS

## 2022-12-07 PROCEDURE — 80048 BASIC METABOLIC PNL TOTAL CA: CPT | Performed by: STUDENT IN AN ORGANIZED HEALTH CARE EDUCATION/TRAINING PROGRAM

## 2022-12-07 PROCEDURE — 63600175 PHARM REV CODE 636 W HCPCS: Performed by: PSYCHIATRY & NEUROLOGY

## 2022-12-07 PROCEDURE — 36415 COLL VENOUS BLD VENIPUNCTURE: CPT | Performed by: STUDENT IN AN ORGANIZED HEALTH CARE EDUCATION/TRAINING PROGRAM

## 2022-12-07 PROCEDURE — 92526 ORAL FUNCTION THERAPY: CPT

## 2022-12-07 PROCEDURE — 99233 SBSQ HOSP IP/OBS HIGH 50: CPT | Mod: ,,, | Performed by: STUDENT IN AN ORGANIZED HEALTH CARE EDUCATION/TRAINING PROGRAM

## 2022-12-07 PROCEDURE — 25000003 PHARM REV CODE 250: Performed by: STUDENT IN AN ORGANIZED HEALTH CARE EDUCATION/TRAINING PROGRAM

## 2022-12-07 PROCEDURE — 99233 PR SUBSEQUENT HOSPITAL CARE,LEVL III: ICD-10-PCS | Mod: ,,, | Performed by: STUDENT IN AN ORGANIZED HEALTH CARE EDUCATION/TRAINING PROGRAM

## 2022-12-07 PROCEDURE — 21400001 HC TELEMETRY ROOM

## 2022-12-07 PROCEDURE — 94761 N-INVAS EAR/PLS OXIMETRY MLT: CPT

## 2022-12-07 PROCEDURE — S5010 5% DEXTROSE AND 0.45% SALINE: HCPCS | Performed by: STUDENT IN AN ORGANIZED HEALTH CARE EDUCATION/TRAINING PROGRAM

## 2022-12-07 RX ORDER — HYDRALAZINE HYDROCHLORIDE 20 MG/ML
10 INJECTION INTRAMUSCULAR; INTRAVENOUS EVERY 6 HOURS PRN
Status: DISCONTINUED | OUTPATIENT
Start: 2022-12-07 | End: 2022-12-13 | Stop reason: HOSPADM

## 2022-12-07 RX ADMIN — DEXTROSE AND SODIUM CHLORIDE: 5; 450 INJECTION, SOLUTION INTRAVENOUS at 08:12

## 2022-12-07 RX ADMIN — DEXTROSE AND SODIUM CHLORIDE: 5; 450 INJECTION, SOLUTION INTRAVENOUS at 10:12

## 2022-12-07 RX ADMIN — ENOXAPARIN SODIUM 60 MG: 80 INJECTION SUBCUTANEOUS at 03:12

## 2022-12-07 RX ADMIN — ENOXAPARIN SODIUM 60 MG: 80 INJECTION SUBCUTANEOUS at 05:12

## 2022-12-07 NOTE — HPI
Apparently, patient is 88 years old, was admitted   to the hospital because she has a lot of jerking movement on the upper and lower   extremity associated with shaking activity, hallucination, and delirium.    Apparently, patient is taking Remeron.  I am not sure for how long.    Unfortunately, I cannot get any history from the patient.  Most of the history I   am getting from the nursing staff in the ER and from the charts.  Apparently,   patient got admitted to [facility] yesterday and was   discharged after she was given propranolol, which helped the symptom to relieve,   but the symptom came back so the patient's daughter brought her back to the   hospital.  I am not sure if the patient has any psychiatric problem in the past,   but the fact that she is on Remeron, maybe she has some depression and anxiety,   so unfortunately the history is not clear to me since nobody in the family is   available to talk to.

## 2022-12-07 NOTE — PT/OT/SLP PROGRESS
Speech Language Pathology Department  Dysphagia Therapy Progress Note    Patient Name:  Judith Ramirez   MRN:  [id number]  Admitting Diagnosis: Dystonia    Recommendations:     General recommendations:  Modified Barium Swallow Study  Diet recommendations:  NPO, Liquid Diet Level: NPO, Other (Comment) (medications crushed in puree)   Barriers to safe discharge:  acuity of illness    Subjective     Patient cooperative and lethargic.    Pain/Comfort: Pain Rating 1: 0/10    Spiritual/Cultural/Yarsani Beliefs/Practices that affect care: no    Objective:     Oral Musculature Evaluation:  Oral Musculature: general weakness  Dentition: edentulous  Mucosal Quality: dry  Voice Prior to PO Intake: hoarse    Extensive oral care performed prior to PO trials.     Therapeutic Activities:  Immediate coughing and wet vocal quality after the swallow on 4/7 teaspoons of thin liquids.   No signs/symptoms of aspiration with puree. Multiple swallows noted.    CASSANDRA improving, however, patient remains lethargic and is not yet appropriate for MBSS. Will complete instrumental assessment in near future. Discussed with patient and family.    Assessment:     Pt presents with signs/sx oropharyngeal dysphagia warranting comprehensive assessment of swallow function to determine safety of PO intake.    Goals:   Multidisciplinary Problems       SLP Goals       Not on file                  Patient Education:     Patient and family provided with verbal education regarding ST POC.  Understanding was verbalized.    Plan:     Will continue to follow and tx as appropriate.    Patient to be seen:  daily   Plan of Care reviewed with:  patient, daughter, family   SLP Follow-Up:  Yes       Time Tracking:     SLP Treatment Date:   12/07/22  Speech Start Time:  1330  Speech Stop Time:  1350     Speech Total Time (min):  20 min    Billable minutes:  Treatment of Swallow Dysfunction, 20 minutes        12/07/2022

## 2022-12-07 NOTE — HOSPITAL COURSE
-12/5: pt with GCS 7 s/p benadryl 25 mg IV, total of 4 mg ativan, and fentanyl IV. CT head, CTA head/neck, and EEG all unrevealing  -12/6: neurologic exam markedly improved, slightly drowsy but easy to arouse and following commands

## 2022-12-07 NOTE — SUBJECTIVE & OBJECTIVE
Subjective:     Interval History:   Mentation continues to improve, as pt is awake, alert, oriented, and following commands today.     Family at bedside. Pt denies any complaints on exam.    Current Neurological Medications:     Current Facility-Administered Medications   Medication Dose Route Frequency Provider Last Rate Last Admin    dextrose 5 % and 0.45 % NaCl infusion   Intravenous Continuous Allie Etienne MD 75 mL/hr at 12/07/22 0850 New Bag at 12/07/22 0850    enoxaparin injection 60 mg  1 mg/kg Subcutaneous Q12H Juan Caba MD   60 mg at 12/07/22 0512    melatonin tablet 6 mg  6 mg Oral Nightly PRN Breezy Wise MD        ondansetron injection 4 mg  4 mg Intravenous Q8H PRN Breezy Wise MD   4 mg at 12/04/22 0454       Review of Systems  A 14pt ros was reviewed & is negative unless o/w documented in the hpi    Objective:     Vital Signs (Most Recent):  Temp: 97.7 °F (36.5 °C) (12/07/22 1105)  Pulse: 64 (12/07/22 1105)  Resp: (!) 22 (12/07/22 1105)  BP: 134/68 (12/07/22 1105)  SpO2: 97 % (12/07/22 1200)   Vital Signs (24h Range):  Temp:  [97.7 °F (36.5 °C)-98.6 °F (37 °C)] 97.7 °F (36.5 °C)  Pulse:  [64-84] 64  Resp:  [16-25] 22  SpO2:  [96 %-98 %] 97 %  BP: (122-156)/(55-73) 134/68     Weight: 56.7 kg (125 lb)  Body mass index is 22.86 kg/m².    Physical Exam  GENERAL: NAD, calm, cooperative, appropriate, alert/awake  MENTAL STATUS: Oriented x4, follows commands reliably  SPEECH/LANGUAGE: Clear, coherent  CN:  EOMI, gaze conjugate, visual fields intact  PERRLA- 4/3  Motor: no focal motor weakness, generalized weakness, 3/5 throughout  Cerebellar: No tremor or dysmetria  Sensory: Normal to tactile stim/vibration  Gait: not observed         Significant Labs:   Recent Lab Results         12/07/22  0403        ANION GAP 10.0       BUN 13.9       BUN/CREAT RATIO 17       Calcium 8.8       Chloride 115       CO2 24       Creatinine 0.81       eGFR >60       Glucose 123       Potassium  3.7       Sodium 149               Significant Imaging: I have reviewed all pertinent imaging results/findings within the past 24 hours.

## 2022-12-07 NOTE — PROGRESS NOTES
[facility] - 3rd Floor Medical Telemetry  Neurology  Progress Note    Patient Name: Judith Ramirez  MRN: [id number]  Admission Date: 12/4/2022  Hospital Length of Stay: 3 days  Code Status: Full Code   Attending Provider: Allie Etienne MD  Primary Care Physician: Allie Baer MD   Principal Problem:Dystonia    HPI:    Apparently, patient is 88 years old, was admitted   to the hospital because she has a lot of jerking movement on the upper and lower   extremity associated with shaking activity, hallucination, and delirium.    Apparently, patient is taking Remeron.  I am not sure for how long.    Unfortunately, I cannot get any history from the patient.  Most of the history I   am getting from the nursing staff in the ER and from the charts.  Apparently,   patient got admitted to [facility] yesterday and was   discharged after she was given propranolol, which helped the symptom to relieve,   but the symptom came back so the patient's daughter brought her back to the   hospital.  I am not sure if the patient has any psychiatric problem in the past,   but the fact that she is on Remeron, maybe she has some depression and anxiety,   so unfortunately the history is not clear to me since nobody in the family is   available to talk to.      Overview/Hospital Course:  -12/5: pt with GCS 7 s/p benadryl 25 mg IV, total of 4 mg ativan, and fentanyl IV. CT head, CTA head/neck, and EEG all unrevealing  -12/6: neurologic exam markedly improved, slightly drowsy but easy to arouse and following commands            Subjective:     Interval History:   Mentation continues to improve, as pt is awake, alert, oriented, and following commands today.     Family at bedside. Pt denies any complaints on exam.    Current Neurological Medications:     Current Facility-Administered Medications   Medication Dose Route Frequency Provider Last Rate Last Admin    dextrose 5 % and 0.45 % NaCl infusion    Intravenous Continuous Allie Etienne MD 75 mL/hr at 12/07/22 0850 New Bag at 12/07/22 0850    enoxaparin injection 60 mg  1 mg/kg Subcutaneous Q12H Juan Caba MD   60 mg at 12/07/22 0512    melatonin tablet 6 mg  6 mg Oral Nightly PRN Breezy Wise MD        ondansetron injection 4 mg  4 mg Intravenous Q8H PRN Breezy Wise MD   4 mg at 12/04/22 0454       Review of Systems  A 14pt ros was reviewed & is negative unless o/w documented in the hpi    Objective:     Vital Signs (Most Recent):  Temp: 97.7 °F (36.5 °C) (12/07/22 1105)  Pulse: 64 (12/07/22 1105)  Resp: (!) 22 (12/07/22 1105)  BP: 134/68 (12/07/22 1105)  SpO2: 97 % (12/07/22 1200)   Vital Signs (24h Range):  Temp:  [97.7 °F (36.5 °C)-98.6 °F (37 °C)] 97.7 °F (36.5 °C)  Pulse:  [64-84] 64  Resp:  [16-25] 22  SpO2:  [96 %-98 %] 97 %  BP: (122-156)/(55-73) 134/68     Weight: 56.7 kg (125 lb)  Body mass index is 22.86 kg/m².    Physical Exam  GENERAL: NAD, calm, cooperative, appropriate, alert/awake  MENTAL STATUS: Oriented x4, follows commands reliably  SPEECH/LANGUAGE: Clear, coherent  CN:  EOMI, gaze conjugate, visual fields intact  PERRLA- 4/3  Motor: no focal motor weakness, generalized weakness, 3/5 throughout  Cerebellar: No tremor or dysmetria  Sensory: Normal to tactile stim/vibration  Gait: not observed         Significant Labs:   Recent Lab Results         12/07/22  0403        ANION GAP 10.0       BUN 13.9       BUN/CREAT RATIO 17       Calcium 8.8       Chloride 115       CO2 24       Creatinine 0.81       eGFR >60       Glucose 123       Potassium 3.7       Sodium 149               Significant Imaging: I have reviewed all pertinent imaging results/findings within the past 24 hours.    Assessment and Plan:     Delirium  2/2 polypharmacy, resolved    No further recs from neurology standpoint   Call with further questions        VTE Risk Mitigation (From admission, onward)         Ordered     enoxaparin injection 60 mg   Every 12 hours (non-standard times)         12/06/22 1334     Place TATIANA hose  Until discontinued         12/04/22 0358     IP VTE LOW RISK PATIENT  Once         12/04/22 0358                [facility]  Inpatient Neurology  [facility] - 3rd Floor Medical Telemetry

## 2022-12-07 NOTE — PROGRESS NOTES
[facility] - 3rd [facility] Medicine  Progress Note    Patient Name: Judith Ramirez  MRN: [id number]  Patient Class: IP- Inpatient   Admission Date: 12/4/2022  Length of Stay: 2 days  Attending Physician: Allie Etienne MD  Primary Care Provider: Allie Baer MD        Subjective:     Principal Problem:Dystonia        HPI:  Ms Wong is a 89 y/o female patient with past medical history significant for hypertension, hyperlipidemia, atrial fibrillation, COPD, restless leg syndrome who presented to ED Friday night due to uncontrolled movements and agitation. History is obtained from son since patient is sedated at the moment of exam. Son states they first started noticing symptoms Friday afternoon. They brought patient to ED in [place] where she was given IV fluids and pramipexole was discontinued due to possible side effect of this medication. Son states patient was discharged from ED and a few hours later continued to present same symptoms therefore they [place] [facility] [place] patient was very agitated and presented with hallucinations. Son states the only change in medications is an increase in baclofen dose. Patient continued to be agitated since admission until this morning where she had an acute change, has been sedated, patient has received several doses of Ativan as well fentanyl x1, Benadryl and Robaxin.       Overview/Hospital Course:  No notes on file    Interval History: More awake as compared to yesterday, open eyes spontaneously, answers questions appropriately, oriented x2.     Review of Systems   Constitutional:  Negative for chills, fatigue and fever.   HENT:  Negative for congestion, rhinorrhea and sore throat.    Respiratory:  Negative for cough, chest tightness and shortness of breath.    Cardiovascular:  Negative for chest pain, palpitations and leg swelling.   Gastrointestinal:  Negative for abdominal distention, abdominal pain,  constipation, diarrhea, nausea and vomiting.   Genitourinary:  Negative for dysuria.   Musculoskeletal:  Negative for arthralgias, back pain and joint swelling.   Neurological:  Negative for dizziness and headaches.   Psychiatric/Behavioral:  Negative for agitation and confusion.    Objective:     Vital Signs (Most Recent):  Temp: 98.1 °F (36.7 °C) (12/06/22 1553)  Pulse: 82 (12/06/22 1553)  Resp: (!) 25 (12/06/22 1553)  BP: 122/65 (12/06/22 1553)  SpO2: 96 % (12/06/22 1553)   Vital Signs (24h Range):  Temp:  [97.4 °F (36.3 °C)-98.3 °F (36.8 °C)] 98.1 °F (36.7 °C)  Pulse:  [] 82  Resp:  [16-25] 25  SpO2:  [95 %-99 %] 96 %  BP: (111-149)/(60-77) 122/65     Weight: 56.7 kg (125 lb)  Body mass index is 22.86 kg/m².  No intake or output data in the 24 hours ending 12/06/22 1929   Physical Exam  Constitutional:       General: She is not in acute distress.     Appearance: Normal appearance.   HENT:      Head: Normocephalic and atraumatic.   Eyes:      Extraocular Movements: Extraocular movements intact.      Pupils: Pupils are equal, round, and reactive to light.   Cardiovascular:      Rate and Rhythm: Normal rate and regular rhythm.      Pulses: Normal pulses.      Heart sounds: Normal heart sounds. No murmur heard.    No friction rub. No gallop.   Pulmonary:      Effort: Pulmonary effort is normal.      Breath sounds: Normal breath sounds. No wheezing, rhonchi or rales.   Abdominal:      General: Abdomen is flat. Bowel sounds are normal. There is no distension.      Palpations: Abdomen is soft.      Tenderness: There is no abdominal tenderness.   Musculoskeletal:         General: No swelling or tenderness. Normal range of motion.      Cervical back: Normal range of motion and neck supple. No tenderness.      Right lower leg: No edema.      Left lower leg: No edema.   Lymphadenopathy:      Cervical: No cervical adenopathy.   Skin:     Findings: No lesion or rash.   Neurological:      General: No focal deficit  present.      Mental Status: She is alert.      Cranial Nerves: No cranial nerve deficit.      Sensory: No sensory deficit.      Motor: No weakness.       Significant Labs: All pertinent labs within the past 24 hours have been reviewed.    Significant Imaging: I have reviewed all pertinent imaging results/findings within the past 24 hours.      Assessment/Plan:      * Dystonia  Resolved, now she is lethargic but responding to commands   Neurology following  Possible side effect to medication, holding all sedatives  CT and  CTA head negative   EEG unremarkable  If mental status does not improve will need MRI brain per Neurology       Encephalopathy acute  Improving   Likely secondary to sedation  No neuro deficits   Neurology following  Planning for MRI brain if no improvement over next 2 days   Speech therapy consulted, awaiting for patient to be more alert to do MBS        Hypernatremia  Due to volume depletion  Will switch IV fluids to D5 half NS at 75 cc/h       Chronic atrial fibrillation  Rate controlled  Unable to restart medications due to decreased level of consciousness   Lovenox 1 mg/kg started for anticoagulation       Hypertension  Unable to restart PO meds  Blood pressure remains controlled       VTE Risk Mitigation (From admission, onward)         Ordered     enoxaparin injection 60 mg  Every 12 hours (non-standard times)         12/06/22 1334     Place TATIANA hose  Until discontinued         12/04/22 0358     IP VTE LOW RISK PATIENT  Once         12/04/22 0358                Discharge Planning   DEYANIRA:      Code Status: Full Code   Is the patient medically ready for discharge?:     Reason for patient still in hospital (select all that apply): Treatment  Discharge Plan A: Home Health                  Allie Baer MD  Department of Hospital Medicine   [facility] - 3rd Floor Medical Telemetry

## 2022-12-07 NOTE — ASSESSMENT & PLAN NOTE
Rate controlled  Unable to restart medications due to decreased level of consciousness   Lovenox 1 mg/kg started for anticoagulation

## 2022-12-07 NOTE — ASSESSMENT & PLAN NOTE
Resolved, now she is lethargic but responding to commands   Neurology following  Possible side effect to medication, holding all sedatives  CT and  CTA head negative   EEG unremarkable  If mental status does not improve will need MRI brain per Neurology

## 2022-12-07 NOTE — ASSESSMENT & PLAN NOTE
2/2 polypharmacy, resolved    No further recs from neurology standpoint   Call with further questions

## 2022-12-07 NOTE — SUBJECTIVE & OBJECTIVE
Interval History: More awake as compared to yesterday, open eyes spontaneously, answers questions appropriately, oriented x2.     Review of Systems   Constitutional:  Negative for chills, fatigue and fever.   HENT:  Negative for congestion, rhinorrhea and sore throat.    Respiratory:  Negative for cough, chest tightness and shortness of breath.    Cardiovascular:  Negative for chest pain, palpitations and leg swelling.   Gastrointestinal:  Negative for abdominal distention, abdominal pain, constipation, diarrhea, nausea and vomiting.   Genitourinary:  Negative for dysuria.   Musculoskeletal:  Negative for arthralgias, back pain and joint swelling.   Neurological:  Negative for dizziness and headaches.   Psychiatric/Behavioral:  Negative for agitation and confusion.    Objective:     Vital Signs (Most Recent):  Temp: 98.1 °F (36.7 °C) (12/06/22 1553)  Pulse: 82 (12/06/22 1553)  Resp: (!) 25 (12/06/22 1553)  BP: 122/65 (12/06/22 1553)  SpO2: 96 % (12/06/22 1553)   Vital Signs (24h Range):  Temp:  [97.4 °F (36.3 °C)-98.3 °F (36.8 °C)] 98.1 °F (36.7 °C)  Pulse:  [] 82  Resp:  [16-25] 25  SpO2:  [95 %-99 %] 96 %  BP: (111-149)/(60-77) 122/65     Weight: 56.7 kg (125 lb)  Body mass index is 22.86 kg/m².  No intake or output data in the 24 hours ending 12/06/22 1929   Physical Exam  Constitutional:       General: She is not in acute distress.     Appearance: Normal appearance.   HENT:      Head: Normocephalic and atraumatic.   Eyes:      Extraocular Movements: Extraocular movements intact.      Pupils: Pupils are equal, round, and reactive to light.   Cardiovascular:      Rate and Rhythm: Normal rate and regular rhythm.      Pulses: Normal pulses.      Heart sounds: Normal heart sounds. No murmur heard.    No friction rub. No gallop.   Pulmonary:      Effort: Pulmonary effort is normal.      Breath sounds: Normal breath sounds. No wheezing, rhonchi or rales.   Abdominal:      General: Abdomen is flat. Bowel sounds  are normal. There is no distension.      Palpations: Abdomen is soft.      Tenderness: There is no abdominal tenderness.   Musculoskeletal:         General: No swelling or tenderness. Normal range of motion.      Cervical back: Normal range of motion and neck supple. No tenderness.      Right lower leg: No edema.      Left lower leg: No edema.   Lymphadenopathy:      Cervical: No cervical adenopathy.   Skin:     Findings: No lesion or rash.   Neurological:      General: No focal deficit present.      Mental Status: She is alert.      Cranial Nerves: No cranial nerve deficit.      Sensory: No sensory deficit.      Motor: No weakness.       Significant Labs: All pertinent labs within the past 24 hours have been reviewed.    Significant Imaging: I have reviewed all pertinent imaging results/findings within the past 24 hours.

## 2022-12-07 NOTE — ASSESSMENT & PLAN NOTE
Improving   Likely secondary to sedation  No neuro deficits   Neurology following  Planning for MRI brain if no improvement over next 2 days   Speech therapy consulted, awaiting for patient to be more alert to do MBS

## 2022-12-08 LAB
ANION GAP SERPL CALC-SCNC: 9 MEQ/L
BUN SERPL-MCNC: 9.6 MG/DL (ref 9.8–20.1)
CALCIUM SERPL-MCNC: 8.6 MG/DL (ref 8.4–10.2)
CHLORIDE SERPL-SCNC: 114 MMOL/L (ref 98–107)
CO2 SERPL-SCNC: 24 MMOL/L (ref 23–31)
CREAT SERPL-MCNC: 0.67 MG/DL (ref 0.55–1.02)
CREAT/UREA NIT SERPL: 14
GFR SERPLBLD CREATININE-BSD FMLA CKD-EPI: >60 MLS/MIN/1.73/M2
GLUCOSE SERPL-MCNC: 146 MG/DL (ref 82–115)
POTASSIUM SERPL-SCNC: 3 MMOL/L (ref 3.5–5.1)
SODIUM SERPL-SCNC: 147 MMOL/L (ref 136–145)

## 2022-12-08 PROCEDURE — 80048 BASIC METABOLIC PNL TOTAL CA: CPT | Performed by: STUDENT IN AN ORGANIZED HEALTH CARE EDUCATION/TRAINING PROGRAM

## 2022-12-08 PROCEDURE — 21400001 HC TELEMETRY ROOM

## 2022-12-08 PROCEDURE — A9698 NON-RAD CONTRAST MATERIALNOC: HCPCS | Performed by: STUDENT IN AN ORGANIZED HEALTH CARE EDUCATION/TRAINING PROGRAM

## 2022-12-08 PROCEDURE — 99233 PR SUBSEQUENT HOSPITAL CARE,LEVL III: ICD-10-PCS | Mod: ,,, | Performed by: STUDENT IN AN ORGANIZED HEALTH CARE EDUCATION/TRAINING PROGRAM

## 2022-12-08 PROCEDURE — 27000221 HC OXYGEN, UP TO 24 HOURS

## 2022-12-08 PROCEDURE — 99233 SBSQ HOSP IP/OBS HIGH 50: CPT | Mod: ,,, | Performed by: STUDENT IN AN ORGANIZED HEALTH CARE EDUCATION/TRAINING PROGRAM

## 2022-12-08 PROCEDURE — 63600175 PHARM REV CODE 636 W HCPCS: Performed by: PSYCHIATRY & NEUROLOGY

## 2022-12-08 PROCEDURE — 63600175 PHARM REV CODE 636 W HCPCS: Performed by: STUDENT IN AN ORGANIZED HEALTH CARE EDUCATION/TRAINING PROGRAM

## 2022-12-08 PROCEDURE — S5010 5% DEXTROSE AND 0.45% SALINE: HCPCS | Performed by: STUDENT IN AN ORGANIZED HEALTH CARE EDUCATION/TRAINING PROGRAM

## 2022-12-08 PROCEDURE — 36415 COLL VENOUS BLD VENIPUNCTURE: CPT | Performed by: STUDENT IN AN ORGANIZED HEALTH CARE EDUCATION/TRAINING PROGRAM

## 2022-12-08 PROCEDURE — 25500020 PHARM REV CODE 255: Performed by: STUDENT IN AN ORGANIZED HEALTH CARE EDUCATION/TRAINING PROGRAM

## 2022-12-08 PROCEDURE — 25000003 PHARM REV CODE 250: Performed by: STUDENT IN AN ORGANIZED HEALTH CARE EDUCATION/TRAINING PROGRAM

## 2022-12-08 PROCEDURE — 92611 MOTION FLUOROSCOPY/SWALLOW: CPT

## 2022-12-08 RX ORDER — METOPROLOL TARTRATE 25 MG/1
12.5 TABLET ORAL 2 TIMES DAILY
Status: DISCONTINUED | OUTPATIENT
Start: 2022-12-08 | End: 2022-12-13 | Stop reason: HOSPADM

## 2022-12-08 RX ORDER — DIGOXIN 125 MCG
0.12 TABLET ORAL DAILY
Status: DISCONTINUED | OUTPATIENT
Start: 2022-12-08 | End: 2022-12-13 | Stop reason: HOSPADM

## 2022-12-08 RX ORDER — FUROSEMIDE 40 MG/1
40 TABLET ORAL DAILY
Status: DISCONTINUED | OUTPATIENT
Start: 2022-12-08 | End: 2022-12-13 | Stop reason: HOSPADM

## 2022-12-08 RX ORDER — ACETAMINOPHEN 650 MG/20.3ML
650 LIQUID ORAL EVERY 6 HOURS PRN
Status: DISCONTINUED | OUTPATIENT
Start: 2022-12-08 | End: 2022-12-13 | Stop reason: HOSPADM

## 2022-12-08 RX ORDER — POTASSIUM CHLORIDE 14.9 MG/ML
20 INJECTION INTRAVENOUS ONCE
Status: COMPLETED | OUTPATIENT
Start: 2022-12-08 | End: 2022-12-08

## 2022-12-08 RX ADMIN — POTASSIUM CHLORIDE 20 MEQ: 14.9 INJECTION, SOLUTION INTRAVENOUS at 03:12

## 2022-12-08 RX ADMIN — BARIUM SULFATE 30 ML: 0.81 POWDER, FOR SUSPENSION ORAL at 11:12

## 2022-12-08 RX ADMIN — DEXTROSE AND SODIUM CHLORIDE: 5; 450 INJECTION, SOLUTION INTRAVENOUS at 12:12

## 2022-12-08 RX ADMIN — METOPROLOL TARTRATE 12.5 MG: 25 TABLET, FILM COATED ORAL at 03:12

## 2022-12-08 RX ADMIN — ENOXAPARIN SODIUM 60 MG: 80 INJECTION SUBCUTANEOUS at 03:12

## 2022-12-08 RX ADMIN — DIGOXIN 0.12 MG: 125 TABLET ORAL at 03:12

## 2022-12-08 RX ADMIN — FUROSEMIDE 40 MG: 40 TABLET ORAL at 03:12

## 2022-12-08 RX ADMIN — ACETAMINOPHEN 650 MG: 650 SOLUTION ORAL at 09:12

## 2022-12-08 RX ADMIN — ACETAMINOPHEN 650 MG: 650 SOLUTION ORAL at 03:12

## 2022-12-08 NOTE — ASSESSMENT & PLAN NOTE
Resolved  Neurology will sign off  Possible side effect to medication, holding all sedatives  CT and  CTA head negative   EEG unremarkable

## 2022-12-08 NOTE — ASSESSMENT & PLAN NOTE
Unable to restart PO meds  Blood pressure elevated today, hydralazine PRN ordered    Once cleared by Speech therapy will restart home medications

## 2022-12-08 NOTE — ASSESSMENT & PLAN NOTE
Rate controlled  Once cleared per Speech therapy will restart oral medications   Lovenox 1 mg/kg started for anticoagulation

## 2022-12-08 NOTE — PROCEDURES
Speech Language Pathology Department  Modified Barium Swallow Study    Patient Name:  Judith Ramirez   MRN:  [id number]  Diagnosis: Dystonia     Recommendations:     General recommendations:  dysphagia therapy  Repeat MBS study: 5-7 days  Diet recommendations:  NPO, Liquid Diet Level: NPO   Swallow strategies/precautions: medications crushed in puree  General precautions: Standard, aspiration    History:     A Modified Barium Swallow Study was completed to assess the efficiency of his/her swallow function, rule out aspiration and make recommendations regarding safe dietary consistencies, effective compensatory strategies, and safe eating environment.     Past Medical History:   Diagnosis Date    COPD (chronic obstructive pulmonary disease)     Coronary artery disease     GERD (gastroesophageal reflux disease)     Hyperlipidemia     Hypertension      Past Surgical History:   Procedure Laterality Date    CHOLECYSTECTOMY      EYE SURGERY      gum grafting      rotator cuff right      thyroid goiter      total kne left      trigger finger thumb right         Home Diet: Puree and thin liquids  Current Method of Nutrition: NPO    Subjective:     Patient awake, alert, and cooperative.    Spiritual/Cultural/Taoism Beliefs/Practices that affect care: no    Pain/Comfort: Pain Rating 1: 0/10    Respiratory Status: oxymask    Fluoroscopic Results:     Oral Musculature Evaluation:  Oral Musculature: general weakness  Dentition: edentulous  Mucosal Quality: good  Voice Prior to PO Intake: hoarse    Visualization  Patient was seen in the lateral view    Oral Phase:   Adequate lip closure  Weak sucking  Prolonged/disorganized bolus formation  Reduced bolus control with liquids    Pharyngeal Phase:   Swallow delay with spill to the valleculae  Reduced base of tongue retraction  Reduced hyolaryngeal excursion  Reduced airway protection  Moderate vallecular residue with moderately thick liquid and puree, did NOT clear with additional  swallow  Consistency Laryngeal Penetration Aspiration   Mildly thick liquid by spoon None None   Puree None None   Mildly thick liquid by cup During the swallow  Did NOT clear None   Mildly thick liquid by straw During the swallow  Did NOT clear None   Moderately thick liquid by cup None None     Cervical Esophageal Phase:   UES appeared to accommodate all bolus types without stasis or retrograde movement visualized    Compensatory Strategies:  NOT effective    Additional Comments:  Additional consistencies were not tested due to severity of impairment and acuity of illness    Assessment:     Pt exhibited moderated oropharyngeal dysphagia and is at HIGH risk for aspiration and associated complications    Goals:   Multidisciplinary Problems       SLP Goals          Problem: SLP    Goal Priority Disciplines Outcome   SLP Goal     SLP Ongoing, Progressing   Description:   LTG: Tolerate least restrictive PO diet with no clinical signs/sx aspiration  STGs:  1.  Tolerate 2oz of mildly thick liquid by spoon with no clinical signs/sx aspiration  2.  Complete base of tongue and laryngeal strengthening exercises with min cues  3.  Tolerate thermal stimulation to the anterior faucial pillars with 100% effortful swallow responses and delay less than 2 seconds.                       Patient Education:     Patient provided with verbal education regarding study results and SLP POC.  Understanding was verbalized, however additional teaching warranted.    Plan:     Patient to be seen:  daily   Plan of Care expires:  12/15/22  Plan of Care reviewed with:  patient   SLP Follow-Up:  Yes      Time Tracking:     SLP Treatment Date:   12/08/22  Speech Start Time:  1055  Speech Stop Time:  1115     Speech Total Time (min):  20 min    Billable minutes:   Motion Fluoroscopic Evaluation, Video Recording, 20 minutes      12/08/2022

## 2022-12-08 NOTE — ASSESSMENT & PLAN NOTE
Improving   Likely secondary to sedation  No neuro deficits   Speech therapy consulted, awaiting MBS

## 2022-12-08 NOTE — CONSULTS
Inpatient Nutrition Assessment    Admit Date: 12/4/2022   Total duration of encounter: 4 days     Nutrition Recommendation/Prescription     -Oral diet per SLP.     -Start tube feeding @ 25ml/hr and advance by 10ml q4hr as tolerated to goal rate.  Isosource HN @ 60ml/hr will provide:   1440kcals (101% est needs)  65g protein (89% est needs)  972ml free water.  *Free water flushes recs if no IVFs: 90ml q4hr (450ml; 1422ml fl/day)    Communication of Recommendations: reviewed with patient/caregiver and reviewed with nurse    Nutrition Assessment     Malnutrition Assessment/Nutrition-Focused Physical Exam    Malnutrition in the context of acute illness or injury  Degree of Malnutrition: severe malnutrition  Energy Intake: </= 50% of estimated energy requirement for >/= 5 days  Interpretation of Weight Loss: >7.5% in 3 months  Body Fat:moderate depletion  Area of Body Fat Loss: orbital region , upper arm region - triceps / biceps, and thoracic and lumbar region - ribs, lower back, midaxillary line  Muscle Mass Loss: moderate depletion  Area of Muscle Mass Loss: temple region - temporalis muscle, clavicle bone region - pectoralis major, deltoid, trapezius muscles, clavicle and acromion bone region - deltoid muscle, scapular bone region - trapezius, supraspinus, infraspinus muscles, and dorsal hand - interosseous musle  Fluid Accumulation: does not meet criteria  Edema: does not meet criteria   Reduced  Strength: unable to obtain  A minimum of two characteristics is recommended for diagnosis of either severe or non-severe malnutrition.    Chart Review    Reason Seen: physician consult    Diagnosis:  Encephalopathy acute, improving  Hypernatremia  Chronic atrial fibrillation  Delirium  Hypertension   Moderated oropharyngeal dysphagia     Relevant Medical History: COPD, CAD, GERD, HLD, and HTN    Nutrition-Related Medications: D5+1/2NS @ 75ml/hr, furosemide  Calorie Containing IV Medications: no significant kcals from  "medications at this time    Nutrition-Related Labs:  22 Na 147, K 3, Cl 114, Gluc 146    Diet/PN Order: Diet NPO  Oral Supplement Order: none  Tube Feeding Order: none  Appetite/Oral Intake: NPO/NPO  Factors Affecting Nutritional Intake: decreased appetite and NPO  Food/Episcopalian/Cultural Preferences: none reported  Food Allergies: none reported    Skin Integrity: bruised (ecchymotic)  Wound(s):   none documented    Comments    22 Consult for tube feedings. NG placed today, NPO x4 days and failed MBS. Pt states decreased appetite for a while. 40-50lb wt loss x1 year ago (UBW 170lb) d/t multiple hospital admissions for COPD. Noticed significant wt loss (10%) in the last 2-3 months in EMR. Placed tube feeding ordered and discussed goal rate with RN.     Anthropometrics    Height: 5' 2" (157.5 cm) Height Method: Stated  Last Weight: 56.7 kg (125 lb) (22 2300) Weight Method: Bed Scale  BMI (Calculated): 22.9  BMI Classification: normal (BMI 18.5-24.9)     Ideal Body Weight (IBW), Female: 110 lb     % Ideal Body Weight, Female (lb): 113.64 %                    Usual Body Weight (UBW), k.27 kg  % Usual Body Weight: 73.53     Usual Weight Provided By: patient    Wt Readings from Last 5 Encounters:   22 56.7 kg (125 lb)   22 56.7 kg (125 lb)   22 59 kg (130 lb)   22 59 kg (130 lb 1.1 oz)   22 59.7 kg (131 lb 9.6 oz)     22 62.6kg   22 63kg    Weight Change(s) Since Admission:  Admit Weight: 56.7 kg (125 lb) (22 0026)      Estimated Needs    Weight Used For Calorie Calculations: 56.7 kg (125 lb)  Energy Calorie Requirements (kcal): 1418kcals/d (25kcals/kg)  Energy Need Method: Kcal/kg  Weight Used For Protein Calculations: 56.7 kg (125 lb)  Protein Requirements: 73-85g/d (1.3-1.5g/kg)  Fluid Requirements (mL): 1418ml fl/d (1ml/kcal)  Temp: 98.2 °F (36.8 °C)       Enteral Nutrition    Patient not receiving enteral nutrition at this time.    Parenteral " Nutrition    Patient not receiving parenteral nutrition support at this time.    Evaluation of Received Nutrient Intake    Calories: not meeting estimated needs  Protein: not meeting estimated needs    Patient Education    Not applicable.    Nutrition Diagnosis     PES: Malnutrition related to iandequate energy intake as evidenced by <50% EER >5 days, >7.5% wt loss x3 months, moderate muscle and fat wasting. (new)    Interventions/Goals     Intervention(s): general/healthful diet, modified composition of enteral nutrition, modified rate of enteral nutrition, and collaboration with other providers  Goal: Meet greater than 75% of nutritional needs by follow-up. (new)    Monitoring & Evaluation     Dietitian will monitor energy intake, weight change, electrolyte/renal panel, and glucose/endocrine profile.  Nutrition Risk/Follow-Up: high (follow-up in 1-4 days)   Please consult if re-assessment needed sooner.

## 2022-12-08 NOTE — PLAN OF CARE
Problem: SLP  Goal: SLP Goal  Description:   LTG: Tolerate least restrictive PO diet with no clinical signs/sx aspiration  STGs:  1.  Tolerate 2oz of mildly thick liquid by spoon with no clinical signs/sx aspiration  2.  Complete base of tongue and laryngeal strengthening exercises with min cues  3.  Tolerate thermal stimulation to the anterior faucial pillars with 100% effortful swallow responses and delay less than 2 seconds.    Outcome: Ongoing, Progressing

## 2022-12-08 NOTE — SUBJECTIVE & OBJECTIVE
Interval History: Patient is close to baseline, able to maintain slight conversation today. AAOx3. Complaining of dryness of mouth.     Review of Systems   Constitutional:  Negative for chills, fatigue and fever.   HENT:  Negative for congestion, rhinorrhea and sore throat.    Respiratory:  Negative for cough, chest tightness and shortness of breath.    Cardiovascular:  Negative for chest pain, palpitations and leg swelling.   Gastrointestinal:  Negative for abdominal distention, abdominal pain, constipation, diarrhea, nausea and vomiting.   Genitourinary:  Negative for dysuria.   Musculoskeletal:  Negative for arthralgias, back pain and joint swelling.   Neurological:  Negative for dizziness and headaches.   Psychiatric/Behavioral:  Negative for agitation and confusion.    Objective:     Vital Signs (Most Recent):  Temp: 98.8 °F (37.1 °C) (12/07/22 1611)  Pulse: 81 (12/07/22 1611)  Resp: (!) 22 (12/07/22 1611)  BP: (!) 141/60 (12/07/22 1611)  SpO2: 96 % (12/07/22 1611)   Vital Signs (24h Range):  Temp:  [97.7 °F (36.5 °C)-98.8 °F (37.1 °C)] 98.8 °F (37.1 °C)  Pulse:  [64-84] 81  Resp:  [16-22] 22  SpO2:  [96 %-98 %] 96 %  BP: (134-156)/(55-73) 141/60     Weight: 56.7 kg (125 lb)  Body mass index is 22.86 kg/m².    Intake/Output Summary (Last 24 hours) at 12/7/2022 1842  Last data filed at 12/7/2022 1433  Gross per 24 hour   Intake 0 ml   Output 600 ml   Net -600 ml      Physical Exam  Constitutional:       General: She is not in acute distress.     Appearance: Normal appearance.   HENT:      Head: Normocephalic and atraumatic.   Eyes:      Extraocular Movements: Extraocular movements intact.      Pupils: Pupils are equal, round, and reactive to light.   Cardiovascular:      Rate and Rhythm: Normal rate and regular rhythm.      Pulses: Normal pulses.      Heart sounds: Normal heart sounds. No murmur heard.    No friction rub. No gallop.   Pulmonary:      Effort: Pulmonary effort is normal.      Breath sounds: Normal  breath sounds. No wheezing, rhonchi or rales.   Abdominal:      General: Abdomen is flat. Bowel sounds are normal. There is no distension.      Palpations: Abdomen is soft.      Tenderness: There is no abdominal tenderness.   Musculoskeletal:         General: No swelling or tenderness. Normal range of motion.      Cervical back: Normal range of motion and neck supple. No tenderness.      Right lower leg: No edema.      Left lower leg: No edema.   Lymphadenopathy:      Cervical: No cervical adenopathy.   Skin:     Findings: No lesion or rash.   Neurological:      General: No focal deficit present.      Mental Status: She is alert and oriented to person, place, and time.      Cranial Nerves: No cranial nerve deficit.      Sensory: No sensory deficit.      Motor: No weakness.       Significant Labs: All pertinent labs within the past 24 hours have been reviewed.    Significant Imaging: I have reviewed all pertinent imaging results/findings within the past 24 hours.

## 2022-12-08 NOTE — PROGRESS NOTES
[facility] - 3rd [facility] Medicine  Progress Note    Patient Name: Judith Ramirez  MRN: [id number]  Patient Class: IP- Inpatient   Admission Date: 12/4/2022  Length of Stay: 3 days  Attending Physician: Allie Etienne MD  Primary Care Provider: Allie Baer MD        Subjective:     Principal Problem:Dystonia        HPI:  Ms Wong is a 87 y/o female patient with past medical history significant for hypertension, hyperlipidemia, atrial fibrillation, COPD, restless leg syndrome who presented to ED Friday night due to uncontrolled movements and agitation. History is obtained from son since patient is sedated at the moment of exam. Son states they first started noticing symptoms Friday afternoon. They brought patient to ED in [place] where she was given IV fluids and pramipexole was discontinued due to possible side effect of this medication. Son states patient was discharged from ED and a few hours later continued to present same symptoms therefore they brought patient to ED at [facility]. In ED patient was very agitated and presented with hallucinations. Son states the only change in medications is an increase in baclofen dose. Patient continued to be agitated since admission until this morning where she had an acute change, has been sedated, patient has received several doses of Ativan as well fentanyl x1, Benadryl and Robaxin.       Overview/Hospital Course:  No notes on file    Interval History: Patient is close to baseline, able to maintain slight conversation today. AAOx3. Complaining of dryness of mouth.     Review of Systems   Constitutional:  Negative for chills, fatigue and fever.   HENT:  Negative for congestion, rhinorrhea and sore throat.    Respiratory:  Negative for cough, chest tightness and shortness of breath.    Cardiovascular:  Negative for chest pain, palpitations and leg swelling.   Gastrointestinal:  Negative for abdominal distention, abdominal  pain, constipation, diarrhea, nausea and vomiting.   Genitourinary:  Negative for dysuria.   Musculoskeletal:  Negative for arthralgias, back pain and joint swelling.   Neurological:  Negative for dizziness and headaches.   Psychiatric/Behavioral:  Negative for agitation and confusion.    Objective:     Vital Signs (Most Recent):  Temp: 98.8 °F (37.1 °C) (12/07/22 1611)  Pulse: 81 (12/07/22 1611)  Resp: (!) 22 (12/07/22 1611)  BP: (!) 141/60 (12/07/22 1611)  SpO2: 96 % (12/07/22 1611)   Vital Signs (24h Range):  Temp:  [97.7 °F (36.5 °C)-98.8 °F (37.1 °C)] 98.8 °F (37.1 °C)  Pulse:  [64-84] 81  Resp:  [16-22] 22  SpO2:  [96 %-98 %] 96 %  BP: (134-156)/(55-73) 141/60     Weight: 56.7 kg (125 lb)  Body mass index is 22.86 kg/m².    Intake/Output Summary (Last 24 hours) at 12/7/2022 1842  Last data filed at 12/7/2022 1433  Gross per 24 hour   Intake 0 ml   Output 600 ml   Net -600 ml      Physical Exam  Constitutional:       General: She is not in acute distress.     Appearance: Normal appearance.   HENT:      Head: Normocephalic and atraumatic.   Eyes:      Extraocular Movements: Extraocular movements intact.      Pupils: Pupils are equal, round, and reactive to light.   Cardiovascular:      Rate and Rhythm: Normal rate and regular rhythm.      Pulses: Normal pulses.      Heart sounds: Normal heart sounds. No murmur heard.    No friction rub. No gallop.   Pulmonary:      Effort: Pulmonary effort is normal.      Breath sounds: Normal breath sounds. No wheezing, rhonchi or rales.   Abdominal:      General: Abdomen is flat. Bowel sounds are normal. There is no distension.      Palpations: Abdomen is soft.      Tenderness: There is no abdominal tenderness.   Musculoskeletal:         General: No swelling or tenderness. Normal range of motion.      Cervical back: Normal range of motion and neck supple. No tenderness.      Right lower leg: No edema.      Left lower leg: No edema.   Lymphadenopathy:      Cervical: No cervical  adenopathy.   Skin:     Findings: No lesion or rash.   Neurological:      General: No focal deficit present.      Mental Status: She is alert and oriented to person, place, and time.      Cranial Nerves: No cranial nerve deficit.      Sensory: No sensory deficit.      Motor: No weakness.       Significant Labs: All pertinent labs within the past 24 hours have been reviewed.    Significant Imaging: I have reviewed all pertinent imaging results/findings within the past 24 hours.      Assessment/Plan:      * Dystonia  Resolved  Neurology will sign off  Possible side effect to medication, holding all sedatives  CT and  CTA head negative   EEG unremarkable        Encephalopathy acute  Improving   Likely secondary to sedation  No neuro deficits   Speech therapy consulted, awaiting MBS        Hypernatremia  Due to volume depletion,  this morning   Continue IV fluids to D5 half NS at 75 cc/h       Chronic atrial fibrillation  Rate controlled  Once cleared per Speech therapy will restart oral medications   Lovenox 1 mg/kg started for anticoagulation       Delirium  Secondary to polypharmacy  Improving   Continue to hold all sedatives       Hypertension  Unable to restart PO meds  Blood pressure elevated today, hydralazine PRN ordered    Once cleared by Speech therapy will restart home medications       VTE Risk Mitigation (From admission, onward)         Ordered     enoxaparin injection 60 mg  Every 12 hours (non-standard times)         12/06/22 1334     Place TATIANA hose  Until discontinued         12/04/22 0358     IP VTE LOW RISK PATIENT  Once         12/04/22 0358                Discharge Planning   DEYANIRA:      Code Status: Full Code   Is the patient medically ready for discharge?:     Reason for patient still in hospital (select all that apply): Treatment  Discharge Plan A: Home Health                  Allie Baer MD  Department of Hospital Medicine   [facility] - 3rd Floor [facility]  Telemetry

## 2022-12-08 NOTE — CONSULTS
Consult Note    HPI:     This is an 87 yo CF known to Dr. 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    Patient is admitted with delirium/encephalopathy and dystonia felt secondary to possible medication side effect. GI service consulted for dysphagia.     Patient was recently admitted at [facility] 11/18/22 with dysphagia and food bolus.  She has intermittent esophageal dysphagia at baseline with occurrences of food impaction.  Clinical history of esophageal dysmotility (IEM) according to HR manometry done by Dr. Diaz in 2019.   EGD 11/18/22 found a large amount of soft food in the esophagus requiring removal which was accomplished.  No obvious stricture but did appear to have dysmotility.  Mild esophagitis from food bolus but otherwise no concerning findings. Patient was seen Dr. Diaz in clinic after discharge.  Esophagram was performed on 11/28 which found severe esophageal dysmotility with tertiary contractions and retention of contrast in the esophagus, no significant stricture evident, swallowed barium tablet passed into the stomach after a weight of a few seconds.  This exam appeared worsened compared to 2019.  Recommendations were to increase baclofen to 10 mg p.o. b.i.d..  She was previously on 5 mg.    Patient had MBS today which found moderate oropharyngeal dysphagia and is at HIGH risk for aspiration and associated complications.       She denies sensation that something is stuck in her chest at this time.      PCP:  Allie Baer MD    Review of patient's allergies indicates:   Allergen Reactions    Aspirin Hives    Aspirin-acetaminophen     Atropine-demerol     Clindamycin Hives    Clindamycin-jagruti per-hyalur na     Darvon compound 32  [propoxyphene-asa-caffeine]     Meperidine Hives    Penicillin Hives    Penicillins     Pentazocine lactate Hives    Propoxyphene Hives    Talwin compound     Tramadol Hives    Codeine Nausea And Vomiting and Other (See Comments)        Current Facility-Administered Medications   Medication Dose Route Frequency Provider Last Rate Last Admin    acetaminophen oral solution 650 mg  650 mg Per NG tube Q6H PRN Allie Etienne MD   650 mg at 12/08/22 1521    dextrose 5 % and 0.45 % NaCl infusion   Intravenous Continuous Allie Etienne MD 75 mL/hr at 12/08/22 1219 New Bag at 12/08/22 1219    digoxin tablet 0.125 mg  0.125 mg Oral Daily Allie Etienne MD   0.125 mg at 12/08/22 1521    enoxaparin injection 60 mg  1 mg/kg Subcutaneous Q12H Juan Caba MD   60 mg at 12/08/22 1521    furosemide tablet 40 mg  40 mg Oral Daily Allie Etienne MD   40 mg at 12/08/22 1521    hydrALAZINE injection 10 mg  10 mg Intravenous Q6H PRN Allie Etienne MD        melatonin tablet 6 mg  6 mg Oral Nightly PRN Breezy Wise MD        metoprolol tartrate (LOPRESSOR) split tablet 12.5 mg  12.5 mg Oral BID Allie Etienne MD   12.5 mg at 12/08/22 1521    ondansetron injection 4 mg  4 mg Intravenous Q8H PRN Breezy Wise MD   4 mg at 12/04/22 0454     Medications Prior to Admission   Medication Sig Dispense Refill Last Dose    acetaminophen (TYLENOL) 325 MG tablet Take 650 mg by mouth.       albuterol (PROVENTIL/VENTOLIN HFA) 90 mcg/actuation inhaler INHALE 2 PUFFS EVERY 6 HOURS 120 g 4     albuterol-ipratropium (DUO-NEB) 2.5 mg-0.5 mg/3 mL nebulizer solution Inhale 3 mLs into the lungs.       apixaban (ELIQUIS) 2.5 mg Tab Take by mouth 2 (two) times daily.       baclofen (LIORESAL) 5 mg Tab tablet        CONTOUR NEXT TEST STRIPS Strp USE 1 STRIP TO CHECK GLUCOSE ONCE DAILY       cyanocobalamin 1,000 mcg/mL injection        digoxin (LANOXIN) 125 mcg tablet        ELIQUIS 5 mg Tab Take 5 mg  by mouth 2 (two) times daily.       ferrous sulfate 325 (65 FE) MG EC tablet Take 1 tablet (325 mg total) by mouth every other day. 30 tablet 3     furosemide (LASIX) 40 MG tablet Take 1 tablet (40 mg total) by mouth once daily. 90 tablet 3     melatonin (MELATIN) Take 1 mg by mouth.       metoprolol tartrate (LOPRESSOR) 25 MG tablet Take 12.5 mg by mouth.       mirtazapine (REMERON) 15 MG tablet Take 1 tablet (15 mg total) by mouth nightly. 90 tablet 3     pantoprazole (PROTONIX) 40 MG tablet Take 1 tablet (40 mg total) by mouth once daily. 30 tablet 3     polyethylene glycol (GLYCOLAX) 17 gram PwPk Take by mouth.       potassium chloride SA (K-DUR,KLOR-CON) 20 MEQ tablet Take 20 mEq by mouth once daily.       pramipexole (MIRAPEX) 0.25 MG tablet Take 0.75 mg by mouth.       pravastatin (PRAVACHOL) 40 MG tablet Take 40 mg by mouth nightly.       STIOLTO RESPIMAT 2.5-2.5 mcg/actuation Mist           Past Medical History:  Past Medical History:   Diagnosis Date    COPD (chronic obstructive pulmonary disease)     Coronary artery disease     GERD (gastroesophageal reflux disease)     Hyperlipidemia     Hypertension       Past Surgical History:  Past Surgical History:   Procedure Laterality Date    CHOLECYSTECTOMY      EYE SURGERY      gum grafting      rotator cuff right      thyroid goiter      total kne left      trigger finger thumb right        Family History:  No family history on file.  Social History:  Social History     Tobacco Use    Smoking status: Former     Types: Cigarettes    Smokeless tobacco: Never    Tobacco comments:     quit 29 yrs ago   Substance Use Topics    Alcohol use: Not on file       Review of Systems:     Review of Systems   Constitutional:  Negative for appetite change, chills, diaphoresis, fatigue, fever and unexpected weight change.   HENT:  Positive for trouble swallowing.    Respiratory:  Negative for cough, chest tightness and shortness of breath.    Cardiovascular:  Negative for chest  pain, palpitations and leg swelling.   Gastrointestinal:  Negative for abdominal distention, abdominal pain, blood in stool, constipation, diarrhea, nausea, rectal pain and vomiting.   Skin:  Negative for color change and pallor.   Neurological:  Negative for dizziness and light-headedness.   Psychiatric/Behavioral:  Negative for confusion (resolved).      Objective:     VITAL SIGNS: 24 HR MIN & MAX LAST    Temp  Min: 97.7 °F (36.5 °C)  Max: 98.8 °F (37.1 °C)  98.2 °F (36.8 °C)        BP  Min: 129/77  Max: 153/78  131/86     Pulse  Min: 73  Max: 83  81     Resp  Min: 18  Max: 22  18    SpO2  Min: 93 %  Max: 98 %  98 %        Intake/Output Summary (Last 24 hours) at 12/8/2022 1603  Last data filed at 12/8/2022 1441  Gross per 24 hour   Intake 0 ml   Output 725 ml   Net -725 ml         Physical Exam  Constitutional:       General: She is not in acute distress.     Appearance: She is ill-appearing (chronically).   HENT:      Head: Normocephalic and atraumatic.      Mouth/Throat:      Mouth: Mucous membranes are dry.      Comments: NG in place  Eyes:      General: No scleral icterus.     Extraocular Movements: Extraocular movements intact.   Cardiovascular:      Rate and Rhythm: Normal rate and regular rhythm.   Pulmonary:      Effort: Pulmonary effort is normal. No respiratory distress.      Comments: 2L O2 in place  Abdominal:      General: Bowel sounds are normal. There is no distension.      Palpations: Abdomen is soft. There is no mass.      Tenderness: There is no abdominal tenderness. There is no guarding or rebound.   Musculoskeletal:         General: Normal range of motion.      Right lower leg: No edema.      Left lower leg: No edema.   Skin:     General: Skin is warm and dry.   Neurological:      Mental Status: She is alert and oriented to person, place, and time.   Psychiatric:         Mood and Affect: Mood and affect normal.         Recent Results (from the past 48 hour(s))   Basic Metabolic Panel     Collection Time: 12/07/22  4:03 AM   Result Value Ref Range    Sodium Level 149 (H) 136 - 145 mmol/L    Potassium Level 3.7 3.5 - 5.1 mmol/L    Chloride 115 (H) 98 - 107 mmol/L    Carbon Dioxide 24 23 - 31 mmol/L    Glucose Level 123 (H) 82 - 115 mg/dL    Blood Urea Nitrogen 13.9 9.8 - 20.1 mg/dL    Creatinine 0.81 0.55 - 1.02 mg/dL    BUN/Creatinine Ratio 17     Calcium Level Total 8.8 8.4 - 10.2 mg/dL    Anion Gap 10.0 mEq/L    eGFR >60 mls/min/1.73/m2   Basic Metabolic Panel    Collection Time: 12/08/22  3:49 AM   Result Value Ref Range    Sodium Level 147 (H) 136 - 145 mmol/L    Potassium Level 3.0 (L) 3.5 - 5.1 mmol/L    Chloride 114 (H) 98 - 107 mmol/L    Carbon Dioxide 24 23 - 31 mmol/L    Glucose Level 146 (H) 82 - 115 mg/dL    Blood Urea Nitrogen 9.6 (L) 9.8 - 20.1 mg/dL    Creatinine 0.67 0.55 - 1.02 mg/dL    BUN/Creatinine Ratio 14     Calcium Level Total 8.6 8.4 - 10.2 mg/dL    Anion Gap 9.0 mEq/L    eGFR >60 mls/min/1.73/m2       X-Ray Chest 1 View    Result Date: 12/6/2022  EXAMINATION: XR CHEST 1 VIEW CPT 47655 CLINICAL HISTORY: Cough; COMPARISON: December 4, 2022 FINDINGS: Examination reveals cardiomediastinal silhouette to be unchanged as compared with the previous exam. Some improvement in changes of pulmonary vascular congestion and cardiac decompensation. There is some thickening of the minor fissure and atelectatic changes in the right mid lung zone. No new focal consolidative changes no other interval change     Improving changes of pulmonary vascular congestion and cardiac decompensation with decreased prominence of the interstitial markings. Persistent changes with some thickening of the minor fissure in some atelectatic/fibrotic changes in the right mid lung zone. No other change Electronically signed by: Neil Izquierdo Date:    12/06/2022 Time:    13:50    X-Ray Chest 1 View    Result Date: 11/17/2022  EXAMINATION: XR CHEST 1 VIEW CLINICAL HISTORY: Chest pain, unspecified TECHNIQUE:  Single frontal view of the chest was performed. COMPARISON: 03/21/2022 FINDINGS: There are chronic appearing lung changes seen bilaterally. No evidence of acute infiltrate is seen. No mass is seen. No lesion is seen. No pleural effusion is seen. The heart appears normal. The aorta appears normal. The bones and joints show no acute abnormality.     Chronic changes seen no acute process Electronically signed by: Rajinder Rose Date:    11/17/2022 Time:    17:59    CT Head Without Contrast    Result Date: 12/4/2022  EXAMINATION: CT HEAD WITHOUT CONTRAST CLINICAL HISTORY: Mental status change, unknown cause;Dystonia; TECHNIQUE: Low dose axial images were obtained through the head.  Coronal and sagittal reformations were also performed. Contrast was not administered. Automatic exposure control was utilized to reduce the patient's radiation dose. DLP= 13 50 COMPARISON: None. FINDINGS: Hemorrhage: No acute intracranial hemorrhage is seen. CSF spaces: The ventricles, sulci and basal cisterns all appear mildly prominent consistent with global cerebral atrophy. Brain parenchyma: Mild microvascular change is seen in portions of the periventricular and deep white matter tracts. Cerebellum: Unremarkable. Sella and skull base: The sella appears to be within normal limits for age. Intracranial calcifications: Incidental note is made of bilateral choroid plexus calcification. Incidental note is made of some pineal region calcification. Incidental note is made of subtle bilateral basal ganglia calcifcation. Calvarium: No acute linear or depressed skull fracture is seen. Maxillofacial Structures: Paranasal sinuses: The visualized paranasal sinuses appear clear with no significant mucoperiosteal thickening or air fluid levels identified. Orbits: The orbits appear unremarkable. Zygomatic arches: The zygomatic arches are intact and unremarkable. Temporal bones and mastoids: The temporal bones and mastoids appear unremarkable. TMJ:  The mandibular condyles appear normally placed with respect to the mandibular fossa. Visualized upper cervical spine: Note is made of degenerative changes of the visualized upper cervical spine.     Impression: 1. No acute intracranial process identified. Details and other findings as noted above. Agree with overnight read.  Findings of chronic microvascular ischemic disease. Electronically signed by: Keyshawn Bennett Date:    12/04/2022 Time:    08:05    Fl Modified Barium Swallow Speech    Result Date: 12/8/2022  See procedure notes from Speech Pathologist. This procedure was auto-finalized.    X-Ray Chest AP Portable    Result Date: 12/4/2022  EXAMINATION: XR CHEST AP PORTABLE CLINICAL HISTORY: shortness of breath; TECHNIQUE: Single view of the chest COMPARISON: 11/17/2022 FINDINGS: Increasing opacification of the right mid lung zone.  Prominent interstitial markings with no focal opacification.  No pleural effusion or pneumothorax.  No acute osseous abnormality     Increasing opacification of the right mid lung zone.  Recommend continued follow-up to exclude worsening process. Electronically signed by: Keyshawn Bennett Date:    12/04/2022 Time:    08:19    FL Esophagram With Barium Tablet    Result Date: 11/28/2022  EXAMINATION: FL ESOPHAGRAM WITH BARIUM TABLET CLINICAL HISTORY: Dysphagia, unspecified COMPARISON: 21 January 2019 FINDINGS: Patient swallowed barium without difficulty. No aspiration seen.  There is severe esophageal dysmotility with tertiary contractions.  There is retention of contrast in the esophagus.  No significant stricture evident. Swallowed barium tablet passed into the stomach after a wait of a few seconds.  No significant hiatal hernia seen. Fluoro Time: 55 seconds Radiation dose 22.89 mGy.     Severe esophageal dysmotility.  This appears worsened compared to 2019. Electronically signed by: Harsha Frances Date:    11/28/2022 Time:    08:35    CTA Head and Neck (xpd)    Result Date:  12/5/2022  EXAMINATION: CTA HEAD AND NECK (XPD) CLINICAL HISTORY: Neuro deficit, acute, stroke suspected; TECHNIQUE: Non contrast low dose axial images were obtained through the head.  CT angiogram was performed from the level of the [place] to the top of the head following the IV administration 100 cc of Isovue 370 contrast .  Sagittal and coronal reconstructions and maximum intensity projection reconstructions were performed. Arterial stenosis percentages are based on NASCET measurement criteria.  Additional multiplanar reconstructions were performed on post processed imaging.  Automatic exposure control (AEC) is utilized to reduce patient radiation exposure. COMPARISON: None FINDINGS: Source images: There is some cerebral atrophy seen consistent with patient's age.  No intracranial mass or lesion is seen.  No hemorrhage is seen.  No infarct is seen.  Ventricles and basilar cisterns appear grossly unremarkable.  No cervical mass or lesion is seen.  No abnormal lymphadenopathy seen.  Thyroid appears normal.  Larynx appears normal.  Trachea is midline.  Visualized portions of the chest shows evidence of COPD and emphysema.. Atherosclerotic changes are seen within the thoracic aorta.  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 There is atherosclerotic plaque seen at the origin of the common carotid artery on the left with approximate 50% stenosis in the proximal left common carotid artery.  There is calcified plaque at the carotid bulb and proximal internal carotid artery but no high-grade stenosis is seen.  The left internal carotid artery is widely patent and seen to level of the petrous ridge and supraclinoid region. There is a mild amount of plaque in the common carotid artery bulb on the right with less than 50% stenosis in the proximal internal carotid artery secondary to the calcified plaque.  The distal internal carotid artery is widely patent and seen to level of the petrous  ridge and supraclinoid region.  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  It is dilated to 5.6 mm. The MCAs are patent bilaterally.  The M1 segments, M2 segments and M3 segments are patent.  No evidence of large vessel occlusion is seen.  The A1 segments are patent.  Anterior cerebral arteries are patent bilaterally with no obstruction or aneurysm seen.  Anterior communicating artery is not seen.  Is There is flow seen in both vertebral arteries.  There is likely retrograde flow in the left vertebral artery and a perfuses the distal subclavian artery on the left.  The basilar artery is widely patent.  No obstruction or aneurysm is seen.  Posterior cerebral arteries are patent bilaterally.  The posterior communicating artery is seen on the right but not on the left. .     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 COMPARISON: None. FINDINGS: Enteric tube terminates in the stomach. Unchanged patchy airspace opacities. Retained enteric contrast within the stomach and small bowel. Electronically signed by: Trinity Acosta Date:    12/08/2022 Time:    14:55      Assessment /  Plan:     87 yo CF known to Dr. 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  Patient is admitted with delirium/encephalopathy and dystonia felt secondary to possible medication side effect. GI service consulted for dysphagia.     Dysphagia, esophageal and oropharyngeal  Known ineffective esophageal motility.    MBS 12/8/22: moderate oropharyngeal dysphagia and is at HIGH risk for aspiration and associated complications.        - EGD would not offer much from a therapeutic or diagnostic standpoint given known hx of motor disorder and not clinically with concern for food impaction.   - Dietary/lifestyle modification is best recommendation given age and comorbidities.  Clear liquids and advance diet as tolerated to soft.  Chew food well, small bites, sit up with eating, take sips of liquid with each bite.  Although given oropharyngeal dysphagia and high risk for aspiration as well, PEG might be best option. Patient does not think she wants a PEG.  Will d/w family.     Thank you for allowing us to participate in this patient's care.

## 2022-12-09 LAB
ANION GAP SERPL CALC-SCNC: 9 MEQ/L
BUN SERPL-MCNC: 10.4 MG/DL (ref 9.8–20.1)
CALCIUM SERPL-MCNC: 8.1 MG/DL (ref 8.4–10.2)
CHLORIDE SERPL-SCNC: 109 MMOL/L (ref 98–107)
CO2 SERPL-SCNC: 26 MMOL/L (ref 23–31)
CREAT SERPL-MCNC: 0.69 MG/DL (ref 0.55–1.02)
CREAT/UREA NIT SERPL: 15
GFR SERPLBLD CREATININE-BSD FMLA CKD-EPI: >60 MLS/MIN/1.73/M2
GLUCOSE SERPL-MCNC: 139 MG/DL (ref 82–115)
POTASSIUM SERPL-SCNC: 3.1 MMOL/L (ref 3.5–5.1)
SODIUM SERPL-SCNC: 144 MMOL/L (ref 136–145)

## 2022-12-09 PROCEDURE — S5010 5% DEXTROSE AND 0.45% SALINE: HCPCS | Performed by: STUDENT IN AN ORGANIZED HEALTH CARE EDUCATION/TRAINING PROGRAM

## 2022-12-09 PROCEDURE — 97162 PT EVAL MOD COMPLEX 30 MIN: CPT

## 2022-12-09 PROCEDURE — 80048 BASIC METABOLIC PNL TOTAL CA: CPT | Performed by: STUDENT IN AN ORGANIZED HEALTH CARE EDUCATION/TRAINING PROGRAM

## 2022-12-09 PROCEDURE — 92526 ORAL FUNCTION THERAPY: CPT

## 2022-12-09 PROCEDURE — 25000003 PHARM REV CODE 250: Performed by: STUDENT IN AN ORGANIZED HEALTH CARE EDUCATION/TRAINING PROGRAM

## 2022-12-09 PROCEDURE — 21400001 HC TELEMETRY ROOM

## 2022-12-09 PROCEDURE — 63600175 PHARM REV CODE 636 W HCPCS: Performed by: PSYCHIATRY & NEUROLOGY

## 2022-12-09 PROCEDURE — 99233 SBSQ HOSP IP/OBS HIGH 50: CPT | Mod: ,,, | Performed by: STUDENT IN AN ORGANIZED HEALTH CARE EDUCATION/TRAINING PROGRAM

## 2022-12-09 PROCEDURE — 99233 PR SUBSEQUENT HOSPITAL CARE,LEVL III: ICD-10-PCS | Mod: ,,, | Performed by: STUDENT IN AN ORGANIZED HEALTH CARE EDUCATION/TRAINING PROGRAM

## 2022-12-09 PROCEDURE — 36415 COLL VENOUS BLD VENIPUNCTURE: CPT | Performed by: STUDENT IN AN ORGANIZED HEALTH CARE EDUCATION/TRAINING PROGRAM

## 2022-12-09 PROCEDURE — 63600175 PHARM REV CODE 636 W HCPCS: Performed by: STUDENT IN AN ORGANIZED HEALTH CARE EDUCATION/TRAINING PROGRAM

## 2022-12-09 RX ORDER — POTASSIUM CHLORIDE 14.9 MG/ML
20 INJECTION INTRAVENOUS ONCE
Status: COMPLETED | OUTPATIENT
Start: 2022-12-09 | End: 2022-12-09

## 2022-12-09 RX ADMIN — METOPROLOL TARTRATE 12.5 MG: 25 TABLET, FILM COATED ORAL at 09:12

## 2022-12-09 RX ADMIN — FUROSEMIDE 40 MG: 40 TABLET ORAL at 09:12

## 2022-12-09 RX ADMIN — ACETAMINOPHEN 650 MG: 650 SOLUTION ORAL at 04:12

## 2022-12-09 RX ADMIN — POTASSIUM CHLORIDE 20 MEQ: 14.9 INJECTION, SOLUTION INTRAVENOUS at 11:12

## 2022-12-09 RX ADMIN — ENOXAPARIN SODIUM 60 MG: 80 INJECTION SUBCUTANEOUS at 03:12

## 2022-12-09 RX ADMIN — DEXTROSE AND SODIUM CHLORIDE: 5; 450 INJECTION, SOLUTION INTRAVENOUS at 03:12

## 2022-12-09 RX ADMIN — DIGOXIN 0.12 MG: 125 TABLET ORAL at 09:12

## 2022-12-09 RX ADMIN — METOPROLOL TARTRATE 12.5 MG: 25 TABLET, FILM COATED ORAL at 08:12

## 2022-12-09 NOTE — PT/OT/SLP PROGRESS
Speech Language Pathology Department  Dysphagia Therapy Progress Note    Patient Name:  Judith Ramirez   MRN:  [id number]  Admitting Diagnosis: Dystonia    Recommendations:     General recommendations:  dysphagia therapy  Diet recommendations:  NPO, Liquid Diet Level: NPO   Aspiration precautions: small bites/sips and slow rate    Subjective     Patient awake and alert.    Pain/Comfort:  no    Spiritual/Cultural/Muslim Beliefs/Practices that affect care: no    Objective:     Oral Musculature Evaluation:  Oral Musculature: general weakness  Dentition: edentulous  Mucosal Quality: good  Voice Prior to PO Intake: hoarse    Therapeutic Activities:  Pt completed base of tongue and laryngeal x60 with minimal-moderate cues.  Pt tolerated thermal stimulation to the anterior faucial pillars x15 with 100% swallow responses.      Assessment:     Pt presents with dysphagia and is unsafe for PO intake.   Goals:   Multidisciplinary Problems       SLP Goals          Problem: SLP    Goal Priority Disciplines Outcome   SLP Goal     SLP Ongoing, Progressing   Description:   LTG: Tolerate least restrictive PO diet with no clinical signs/sx aspiration  STGs:  1.  Tolerate 2oz of mildly thick liquid by spoon with no clinical signs/sx aspiration  2.  Complete base of tongue and laryngeal strengthening exercises with min cues  3.  Tolerate thermal stimulation to the anterior faucial pillars with 100% effortful swallow responses and delay less than 2 seconds.                       Patient Education:     Patient provided with verbal education regarding POC.  Understanding was verbalized.    Plan:     Will continue to follow and tx as appropriate.    Patient to be seen:  daily   Plan of Care expires:  12/15/22  Plan of Care reviewed with:  patient, son   SLP Follow-Up:  Yes       Time Tracking:     SLP Treatment Date:    12/9/22  Speech Start Time:   1200  Speech Stop Time:      1210  Speech Total Time (min):   10    Billable  minutes:  Treatment of Swallow Dysfunction, 10        12/09/2022

## 2022-12-09 NOTE — ASSESSMENT & PLAN NOTE
Rate controlled  Restarted home digoxin and metoprolol per NG tube   Lovenox 1 mg/kg started for anticoagulation

## 2022-12-09 NOTE — ASSESSMENT & PLAN NOTE
Patient had issues prior to hospitalization, following with GI  GI consulted  Speech therapy following, did MBS today, recommend to keep NPO  Patient agrees to NG tube placement, tube feeds started   Will make final decision on PEG tube

## 2022-12-09 NOTE — ASSESSMENT & PLAN NOTE
Patient had issues prior to hospitalization, following with GI  Will consult GI while inpatient  Speech therapy following, did MBS today, recommend to keep NPO  Patient agrees to NG tube placement, will start tube feeds  Patient agreed to NG tube since this is temporary but does not want PEG tube

## 2022-12-09 NOTE — PT/OT/SLP PROGRESS
Speech Language Pathology Department  Dysphagia Therapy Progress Note    Patient Name:  Judith Ramirez   MRN:  [id number]  Admitting Diagnosis: Dystonia    Recommendations:     General recommendations:  dysphagia therapy  Diet recommendations:  NPO, Liquid Diet Level: NPO     Barriers to safe discharge:  acuity of illness    Subjective     Patient awake, alert, and oriented x4 .    Pain/Comfort: Pain Rating 1: 0/10    Spiritual/Cultural/Amish Beliefs/Practices that affect care: no    Respiratory Status: oxymask    Objective:     Oral Musculature Evaluation:  Oral Musculature: general weakness  Dentition: edentulous  Mucosal Quality: good  Voice Prior to PO Intake: hoarse    Therapeutic Activities:  Pt completed base of tongue and laryngeal x80 with minimal cues.  Pt tolerated thermal stimulation to the anterior faucial pillars x20 with 100% swallow responses.  Laryngeal excursion limited.  Delay varied 2-5 seconds.    Assessment:     Pt continues to present with dysphagia and remains unsafe for PO diet.    Goals:   Multidisciplinary Problems       SLP Goals          Problem: SLP    Goal Priority Disciplines Outcome   SLP Goal     SLP Ongoing, Progressing   Description:   LTG: Tolerate least restrictive PO diet with no clinical signs/sx aspiration  STGs:  1.  Tolerate 2oz of mildly thick liquid by spoon with no clinical signs/sx aspiration  2.  Complete base of tongue and laryngeal strengthening exercises with min cues  3.  Tolerate thermal stimulation to the anterior faucial pillars with 100% effortful swallow responses and delay less than 2 seconds.                       Patient Education:     Patient and son/s provided with verbal education regarding SLP POC.  Understanding was verbalized.    Plan:     Will continue to follow and tx as appropriate.    Patient to be seen:  daily   Plan of Care expires:  12/15/22  Plan of Care reviewed with:  patient, son   SLP Follow-Up:  Yes       Time Tracking:     SLP Treatment  Date:   12/09/22  Speech Start Time:  1010  Speech Stop Time:  1025     Speech Total Time (min):  15 min    Billable minutes:  Treatment of Swallow Dysfunction, 15 minutes        12/09/2022

## 2022-12-09 NOTE — SUBJECTIVE & OBJECTIVE
Interval History: Mental status continues to improve however patient continues to have dysphagia, evaluated today with MBS, speech recommends to keep NPO, had discussion with patient regarding NG tube placement and she agrees.     Review of Systems   Constitutional:  Negative for chills, fatigue and fever.   HENT:  Negative for congestion, rhinorrhea and sore throat.    Respiratory:  Negative for cough, chest tightness and shortness of breath.    Cardiovascular:  Negative for chest pain, palpitations and leg swelling.   Gastrointestinal:  Negative for abdominal distention, abdominal pain, constipation, diarrhea, nausea and vomiting.   Genitourinary:  Negative for dysuria.   Musculoskeletal:  Negative for arthralgias, back pain and joint swelling.   Neurological:  Positive for weakness. Negative for dizziness and headaches.   Psychiatric/Behavioral:  Negative for agitation and confusion.    Objective:     Vital Signs (Most Recent):  Temp: 98.2 °F (36.8 °C) (12/08/22 1537)  Pulse: 81 (12/08/22 1537)  Resp: 18 (12/08/22 1537)  BP: 131/86 (12/08/22 1537)  SpO2: 98 % (12/08/22 1537) Vital Signs (24h Range):  Temp:  [97.7 °F (36.5 °C)-98.8 °F (37.1 °C)] 98.2 °F (36.8 °C)  Pulse:  [73-83] 81  Resp:  [18-20] 18  SpO2:  [93 %-98 %] 98 %  BP: (129-153)/(71-86) 131/86     Weight: 56.7 kg (125 lb)  Body mass index is 22.86 kg/m².    Intake/Output Summary (Last 24 hours) at 12/8/2022 1824  Last data filed at 12/8/2022 1441  Gross per 24 hour   Intake 0 ml   Output 725 ml   Net -725 ml      Physical Exam  Constitutional:       General: She is not in acute distress.     Appearance: Normal appearance.   HENT:      Head: Normocephalic and atraumatic.   Eyes:      Extraocular Movements: Extraocular movements intact.      Pupils: Pupils are equal, round, and reactive to light.   Cardiovascular:      Rate and Rhythm: Normal rate and regular rhythm.      Pulses: Normal pulses.      Heart sounds: Normal heart sounds. No murmur heard.     No friction rub. No gallop.   Pulmonary:      Effort: Pulmonary effort is normal.      Breath sounds: Normal breath sounds. No wheezing, rhonchi or rales.   Abdominal:      General: Abdomen is flat. Bowel sounds are normal. There is no distension.      Palpations: Abdomen is soft.      Tenderness: There is no abdominal tenderness.   Musculoskeletal:         General: No swelling or tenderness. Normal range of motion.      Cervical back: Normal range of motion and neck supple. No tenderness.      Right lower leg: No edema.      Left lower leg: No edema.   Lymphadenopathy:      Cervical: No cervical adenopathy.   Skin:     Findings: No lesion or rash.   Neurological:      General: No focal deficit present.      Mental Status: She is alert and oriented to person, place, and time.      Cranial Nerves: No cranial nerve deficit.      Sensory: No sensory deficit.      Motor: No weakness.   Psychiatric:         Mood and Affect: Mood normal.         Behavior: Behavior normal.         Thought Content: Thought content normal.       Significant Labs: All pertinent labs within the past 24 hours have been reviewed.    Significant Imaging: I have reviewed all pertinent imaging results/findings within the past 24 hours.

## 2022-12-09 NOTE — PLAN OF CARE
Problem: Physical Therapy  Goal: Physical Therapy Goal  Description: Goals to be met by: 2023     Patient will increase functional independence with mobility by performin. Supine to sit with [place]  2. Sit to stand transfer with [place]  3. Bed to chair transfer with Modified [place] using Rolling Walker  4. Gait  x 400 feet with Modified [place] using Rolling Walker.     Outcome: Ongoing, Progressing

## 2022-12-09 NOTE — PROGRESS NOTES
"Gastroenterology Progress Note    Subjective/Interval History:  Remains with NG tube.  Worked with ST today and remains inappropriate for PO intake.  C/O foot pain.     Vital Signs:  /67   Pulse 79   Temp 98.4 °F (36.9 °C) (Oral)   Resp 20   Ht 5' 2" (1.575 m)   Wt 56.7 kg (125 lb)   SpO2 100%   BMI 22.86 kg/m²   Body mass index is 22.86 kg/m².    Physical Exam:  Constitutional:       General: She is not in acute distress.     Appearance: She is ill-appearing (chronically).   HENT:      Head: Normocephalic and atraumatic.      Mouth/Throat:      Mouth: Mucous membranes are dry.      Comments: NG in place  Eyes:      General: No scleral icterus.     Extraocular Movements: Extraocular movements intact.   Cardiovascular:      Rate and Rhythm: Normal rate and regular rhythm.   Pulmonary:      Effort: Pulmonary effort is normal. No respiratory distress.      Comments: 2L O2 in place  Abdominal:      General: Bowel sounds are normal. There is no distension.      Palpations: Abdomen is soft. There is no mass.      Tenderness: There is no abdominal tenderness. There is no guarding or rebound.   Musculoskeletal:         General: Normal range of motion.      Right lower leg: No edema.      Left lower leg: No edema.   Skin:     General: Skin is warm and dry.   Neurological:      Mental Status: She is alert and oriented to person, place, and time.   Psychiatric:         Mood and Affect: Mood and affect normal.     Labs:  Recent Results (from the past 48 hour(s))   Basic Metabolic Panel    Collection Time: 12/08/22  3:49 AM   Result Value Ref Range    Sodium Level 147 (H) 136 - 145 mmol/L    Potassium Level 3.0 (L) 3.5 - 5.1 mmol/L    Chloride 114 (H) 98 - 107 mmol/L    Carbon Dioxide 24 23 - 31 mmol/L    Glucose Level 146 (H) 82 - 115 mg/dL    Blood Urea Nitrogen 9.6 (L) 9.8 - 20.1 mg/dL    Creatinine 0.67 0.55 - 1.02 mg/dL    BUN/Creatinine Ratio 14     Calcium Level Total 8.6 8.4 - 10.2 mg/dL    Anion Gap 9.0 " mEq/L    eGFR >60 mls/min/1.73/m2   Basic Metabolic Panel    Collection Time: 12/09/22  4:14 AM   Result Value Ref Range    Sodium Level 144 136 - 145 mmol/L    Potassium Level 3.1 (L) 3.5 - 5.1 mmol/L    Chloride 109 (H) 98 - 107 mmol/L    Carbon Dioxide 26 23 - 31 mmol/L    Glucose Level 139 (H) 82 - 115 mg/dL    Blood Urea Nitrogen 10.4 9.8 - 20.1 mg/dL    Creatinine 0.69 0.55 - 1.02 mg/dL    BUN/Creatinine Ratio 15     Calcium Level Total 8.1 (L) 8.4 - 10.2 mg/dL    Anion Gap 9.0 mEq/L    eGFR >60 mls/min/1.73/m2         Assessment/Plan:  89 yo CF known to Dr. 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  Patient is admitted with delirium/encephalopathy and dystonia felt secondary to possible medication side effect. GI service consulted for dysphagia.      Dysphagia, esophageal and oropharyngeal  Known ineffective esophageal motility.    MBS 12/8/22: moderate oropharyngeal dysphagia and is at HIGH risk for aspiration and associated complications.        - EGD would not offer much from a therapeutic or diagnostic standpoint given known hx of motor disorder and not clinically with concern for food impaction.   - Dietary/lifestyle modification is best recommendation given age and comorbidities aside from PEG...Chew food well, small bites, sit up with eating, take sips of liquid with each bite.  Although given oropharyngeal dysphagia and high risk for aspiration as well, PEG might be best option. D/W family as well.      Patient has decided on PEG. Will place on schedule for Monday.  Continue to hold Eliquis and use lovenox but hold Monday AM.     Discussed with the patient in details.  Patient reports partial gastric resection more than 50 years ago for peptic ulcer disease.  This  might make endoscopic placement somewhat difficult although it can be attempted as patient has underlying respiratory issues which is probably concerning general anesthesia.

## 2022-12-09 NOTE — ASSESSMENT & PLAN NOTE
Resolved  Neurology signed off  Possible side effect to medication, holding all sedatives  CT and  CTA head negative   EEG unremarkable  PT consulted

## 2022-12-09 NOTE — PT/OT/SLP EVAL
Physical Therapy Evaluation    Patient Name:  Judith Ramirez   MRN:  [id number]    Recommendations:     Discharge Recommendations: nursing facility, skilled   Discharge Equipment Recommendations: none   Barriers to discharge: Decreased caregiver support    Assessment:     Judith Ramirez is a 88 y.o. female admitted with a medical diagnosis of Dystonia, delirium, abnormal involuntary movement, pulmonary infiltrate.  She presents with the following impairments/functional limitations:   Pt requires min assist to go from supine to sitting EOB. She is independent with scooting and requires modA to perform a sit to stand using a RW. Pt took steps to transfer to her bed side chair.    Rehab Prognosis: Good; patient would benefit from acute skilled PT services to address these deficits and reach maximum level of function.    Recent Surgery: * No surgery found *      Plan:     During this hospitalization, patient to be seen 6 x/week to address the identified rehab impairments via therapeutic activities, gait training, therapeutic exercises and progress toward the following goals:    Plan of Care Expires:       Subjective     Chief Complaint: none  Patient/Family Comments/goals: get better to go home  Pain/Comfort:  Pain Rating 1:  (Pt noted pain in R forefoot)  Location - Side 1: Right  Pain Addressed 1: Nurse notified    Patients cultural, spiritual, Sabianism conflicts given the current situation: no    Living Environment:  Lives alone in a SLH with one step to enter. Her two sons lives next door to her. She has a bathtub shower.  Prior to admission, patients level of function was independent with ADLs.  Equipment used at home: walker, rolling, cane, straight.  DME owned (not currently used): none.  Upon discharge, patient will have assistance from sons.    Objective:     Communicated with nurisng prior to session.  Patient found HOB elevated with    upon PT entry to room.    General Precautions: Standard,  aspiration  Orthopedic Precautions:    Braces:    Respiratory Status:  oxymask 2L/min    Exams:  Cognitive Exam:  Patient is oriented to Person, Place, and Time  RLE ROM: WNL  RLE Strength: WNL  LLE ROM: WNL  LLE Strength: WNL    Functional Mobility:  Bed Mobility:     Scooting: independence  Supine to Sit: minimum assistance  Transfers:     Sit to Stand:  moderate assistance with rolling walker  Bed to Chair: contact guard assistance with  rolling walker  using  Step Transfer      AM-PAC 6 CLICK MOBILITY  Total Score:16       Treatment & Education:  Bed mobility  STSx2  Bed to chair transfer    Patient left up in chair with all lines intact, call button in reach, nurse notified, and son present.    GOALS:   Multidisciplinary Problems       Physical Therapy Goals          Problem: Physical Therapy    Goal Priority Disciplines Outcome Goal Variances Interventions   Physical Therapy Goal     PT, PT/OT Ongoing, Progressing     Description: Goals to be met by: 2023     Patient will increase functional independence with mobility by performin. Supine to sit with [place]  2. Sit to stand transfer with [place]  3. Bed to chair transfer with Modified [place] using Rolling Walker  4. Gait  x 400 feet with Modified [place] using Rolling Walker.                          History:     Past Medical History:   Diagnosis Date    COPD (chronic obstructive pulmonary disease)     Coronary artery disease     GERD (gastroesophageal reflux disease)     Hyperlipidemia     Hypertension        Past Surgical History:   Procedure Laterality Date    CHOLECYSTECTOMY      EYE SURGERY      gum grafting      rotator cuff right      thyroid goiter      total kne left      trigger finger thumb right         Time Tracking:     PT Received On: 22  PT Start Time: 923     PT Stop Time: 947  PT Total Time (min): 24 min     Billable Minutes: Evaluation 24      2022

## 2022-12-09 NOTE — PROGRESS NOTES
[facility] - 3rd [facility] Medicine  Progress Note    Patient Name: Judith Ramirez  MRN: [id number]  Patient Class: IP- Inpatient   Admission Date: 12/4/2022  Length of Stay: 4 days  Attending Physician: Allie Etienne MD  Primary Care Provider: Allie Baer MD        Subjective:     Principal Problem:Dystonia        HPI:  Ms Wong is a 89 y/o female patient with past medical history significant for hypertension, hyperlipidemia, atrial fibrillation, COPD, restless leg syndrome who presented to ED Friday night due to uncontrolled movements and agitation. History is obtained from son since patient is sedated at the moment of exam. Son states they first started noticing symptoms Friday afternoon. They brought patient to ED in [place] where she was given IV fluids and pramipexole was discontinued due to possible side effect of this medication. Son states patient was discharged from ED and a few hours later continued to present same symptoms therefore they brought patient to ED at [facility]. In ED patient was very agitated and presented with hallucinations. Son states the only change in medications is an increase in baclofen dose. Patient continued to be agitated since admission until this morning where she had an acute change, has been sedated, patient has received several doses of Ativan as well fentanyl x1, Benadryl and Robaxin.       Overview/Hospital Course:  No notes on file    Interval History: Mental status continues to improve however patient continues to have dysphagia, evaluated today with MBS, speech recommends to keep NPO, had discussion with patient regarding NG tube placement and she agrees.     Review of Systems   Constitutional:  Negative for chills, fatigue and fever.   HENT:  Negative for congestion, rhinorrhea and sore throat.    Respiratory:  Negative for cough, chest tightness and shortness of breath.    Cardiovascular:  Negative for chest pain,  palpitations and leg swelling.   Gastrointestinal:  Negative for abdominal distention, abdominal pain, constipation, diarrhea, nausea and vomiting.   Genitourinary:  Negative for dysuria.   Musculoskeletal:  Negative for arthralgias, back pain and joint swelling.   Neurological:  Positive for weakness. Negative for dizziness and headaches.   Psychiatric/Behavioral:  Negative for agitation and confusion.    Objective:     Vital Signs (Most Recent):  Temp: 98.2 °F (36.8 °C) (12/08/22 1537)  Pulse: 81 (12/08/22 1537)  Resp: 18 (12/08/22 1537)  BP: 131/86 (12/08/22 1537)  SpO2: 98 % (12/08/22 1537) Vital Signs (24h Range):  Temp:  [97.7 °F (36.5 °C)-98.8 °F (37.1 °C)] 98.2 °F (36.8 °C)  Pulse:  [73-83] 81  Resp:  [18-20] 18  SpO2:  [93 %-98 %] 98 %  BP: (129-153)/(71-86) 131/86     Weight: 56.7 kg (125 lb)  Body mass index is 22.86 kg/m².    Intake/Output Summary (Last 24 hours) at 12/8/2022 1824  Last data filed at 12/8/2022 1441  Gross per 24 hour   Intake 0 ml   Output 725 ml   Net -725 ml      Physical Exam  Constitutional:       General: She is not in acute distress.     Appearance: Normal appearance.   HENT:      Head: Normocephalic and atraumatic.   Eyes:      Extraocular Movements: Extraocular movements intact.      Pupils: Pupils are equal, round, and reactive to light.   Cardiovascular:      Rate and Rhythm: Normal rate and regular rhythm.      Pulses: Normal pulses.      Heart sounds: Normal heart sounds. No murmur heard.    No friction rub. No gallop.   Pulmonary:      Effort: Pulmonary effort is normal.      Breath sounds: Normal breath sounds. No wheezing, rhonchi or rales.   Abdominal:      General: Abdomen is flat. Bowel sounds are normal. There is no distension.      Palpations: Abdomen is soft.      Tenderness: There is no abdominal tenderness.   Musculoskeletal:         General: No swelling or tenderness. Normal range of motion.      Cervical back: Normal range of motion and neck supple. No  tenderness.      Right lower leg: No edema.      Left lower leg: No edema.   Lymphadenopathy:      Cervical: No cervical adenopathy.   Skin:     Findings: No lesion or rash.   Neurological:      General: No focal deficit present.      Mental Status: She is alert and oriented to person, place, and time.      Cranial Nerves: No cranial nerve deficit.      Sensory: No sensory deficit.      Motor: No weakness.   Psychiatric:         Mood and Affect: Mood normal.         Behavior: Behavior normal.         Thought Content: Thought content normal.       Significant Labs: All pertinent labs within the past 24 hours have been reviewed.    Significant Imaging: I have reviewed all pertinent imaging results/findings within the past 24 hours.      Assessment/Plan:      * Dystonia  Resolved  Neurology signed off  Possible side effect to medication, holding all sedatives  CT and  CTA head negative   EEG unremarkable  Will get PT to evaluate now she is more alert       Encephalopathy acute  Improving   Likely secondary to sedation  No neuro deficits   Speech therapy consulted, awaiting MBS        Hypernatremia  Due to volume depletion, improving    Continue IV fluids to D5 half NS at 75 cc/h       Chronic atrial fibrillation  Rate controlled  Restarted home digoxin and metoprolol per NG tube   Lovenox 1 mg/kg started for anticoagulation       Delirium  Secondary to polypharmacy  Improving   Continue to hold all sedatives       Dysphagia  Patient had issues prior to hospitalization, following with GI  Will consult GI while inpatient  Speech therapy following, did MBS today, recommend to keep NPO  Patient agrees to NG tube placement, will start tube feeds  Patient agreed to NG tube since this is temporary but does not want PEG tube       Hypertension  Restarted home meds, NG tube placed   Hydralazine PRN       VTE Risk Mitigation (From admission, onward)         Ordered     enoxaparin injection 60 mg  Every 12 hours (non-standard  times)         12/06/22 1334     Place TATIANA hose  Until discontinued         12/04/22 0358     IP VTE LOW RISK PATIENT  Once         12/04/22 0358                Discharge Planning   DEYANIRA:      Code Status: Full Code   Is the patient medically ready for discharge?:     Reason for patient still in hospital (select all that apply): Treatment  Discharge Plan A: Home Health                  Allie Baer MD  Department of Hospital Medicine   [facility] - 3rd Floor Medical Telemetry

## 2022-12-09 NOTE — PLAN OF CARE
12/09/22 1145   Post-Acute Status   Post-Acute Authorization Placement   Post-Acute Placement Status Referrals Sent   Discharge Plan   Discharge Plan A Skilled Nursing Facility   Discharge Plan B Skilled Nursing Facility     Therapy recommended SNF.  Daughter wants patient to go to TCU for SNF, will follow up.

## 2022-12-09 NOTE — SUBJECTIVE & OBJECTIVE
Interval History: Had discussion with family and CM regarding SNF. Patient and family agrees to going to SNF. Discussed likely need for PEG tube, patient has stated she does not wish to have this but will discuss with family and will make a final decision. Continues to wel with PT and speech therapy. No new complaints today.     Review of Systems   Constitutional:  Negative for chills, fatigue and fever.   HENT:  Negative for congestion, rhinorrhea and sore throat.    Respiratory:  Negative for cough, chest tightness and shortness of breath.    Cardiovascular:  Negative for chest pain, palpitations and leg swelling.   Gastrointestinal:  Negative for abdominal distention, abdominal pain, constipation, diarrhea, nausea and vomiting.   Genitourinary:  Negative for dysuria.   Musculoskeletal:  Negative for arthralgias, back pain and joint swelling.   Neurological:  Negative for dizziness and headaches.   Psychiatric/Behavioral:  Negative for agitation and confusion.    Objective:     Vital Signs (Most Recent):  Temp: 98.4 °F (36.9 °C) (12/09/22 0723)  Pulse: 79 (12/09/22 1116)  Resp: 20 (12/09/22 1116)  BP: 124/67 (12/09/22 1116)  SpO2: 100 % (12/09/22 1116) Vital Signs (24h Range):  Temp:  [97.7 °F (36.5 °C)-99 °F (37.2 °C)] 98.4 °F (36.9 °C)  Pulse:  [61-82] 79  Resp:  [18-20] 20  SpO2:  [94 %-100 %] 100 %  BP: (116-134)/(64-86) 124/67     Weight: 56.7 kg (125 lb)  Body mass index is 22.86 kg/m².    Intake/Output Summary (Last 24 hours) at 12/9/2022 1321  Last data filed at 12/8/2022 2100  Gross per 24 hour   Intake 0 ml   Output 900 ml   Net -900 ml      Physical Exam  Constitutional:       General: She is not in acute distress.     Appearance: Normal appearance.   HENT:      Head: Normocephalic and atraumatic.   Eyes:      Extraocular Movements: Extraocular movements intact.      Pupils: Pupils are equal, round, and reactive to light.   Cardiovascular:      Rate and Rhythm: Normal rate and regular rhythm.       Pulses: Normal pulses.      Heart sounds: Normal heart sounds. No murmur heard.    No friction rub. No gallop.   Pulmonary:      Effort: Pulmonary effort is normal.      Breath sounds: Normal breath sounds. No wheezing, rhonchi or rales.   Abdominal:      General: Abdomen is flat. Bowel sounds are normal. There is no distension.      Palpations: Abdomen is soft.      Tenderness: There is no abdominal tenderness.   Musculoskeletal:         General: No swelling or tenderness. Normal range of motion.      Cervical back: Normal range of motion and neck supple. No tenderness.      Right lower leg: No edema.      Left lower leg: No edema.   Lymphadenopathy:      Cervical: No cervical adenopathy.   Skin:     Findings: No lesion or rash.   Neurological:      General: No focal deficit present.      Mental Status: She is alert and oriented to person, place, and time.      Cranial Nerves: No cranial nerve deficit.      Sensory: No sensory deficit.      Motor: No weakness.       Significant Labs: All pertinent labs within the past 24 hours have been reviewed.    Significant Imaging: I have reviewed all pertinent imaging results/findings within the past 24 hours.

## 2022-12-09 NOTE — ASSESSMENT & PLAN NOTE
Resolved  Neurology signed off  Possible side effect to medication, holding all sedatives  CT and  CTA head negative   EEG unremarkable  Will get PT to evaluate now she is more alert

## 2022-12-09 NOTE — PROGRESS NOTES
[facility] - 3rd [facility] Medicine  Progress Note    Patient Name: Judith Ramirez  MRN: [id number]  Patient Class: IP- Inpatient   Admission Date: 12/4/2022  Length of Stay: 5 days  Attending Physician: Allie Etienne MD  Primary Care Provider: Allie Baer MD        Subjective:     Principal Problem:Dystonia        HPI:  Ms Wong is a 87 y/o female patient with past medical history significant for hypertension, hyperlipidemia, atrial fibrillation, COPD, restless leg syndrome who presented to ED Friday night due to uncontrolled movements and agitation. History is obtained from son since patient is sedated at the moment of exam. Son states they first started noticing symptoms Friday afternoon. They brought patient to ED in [place] where she was given IV fluids and pramipexole was discontinued due to possible side effect of this medication. Son states patient was discharged from ED and a few hours later continued to present same symptoms therefore [place] [facility] [place] patient was very agitated and presented with hallucinations. Son states the only change in medications is an increase in baclofen dose. Patient continued to be agitated since admission until this morning where she had an acute change, has been sedated, patient has received several doses of Ativan as well fentanyl x1, Benadryl and Robaxin.       Interval History: Had discussion with family and CM regarding SNF. Patient and family agrees to going to SNF. Discussed likely need for PEG tube, patient has stated she does not wish to have this but will discuss with family and will make a final decision. Continues to wel with PT and speech therapy. No new complaints today.     Review of Systems   Constitutional:  Negative for chills, fatigue and fever.   HENT:  Negative for congestion, rhinorrhea and sore throat.    Respiratory:  Negative for cough, chest tightness and shortness of  breath.    Cardiovascular:  Negative for chest pain, palpitations and leg swelling.   Gastrointestinal:  Negative for abdominal distention, abdominal pain, constipation, diarrhea, nausea and vomiting.   Genitourinary:  Negative for dysuria.   Musculoskeletal:  Negative for arthralgias, back pain and joint swelling.   Neurological:  Negative for dizziness and headaches.   Psychiatric/Behavioral:  Negative for agitation and confusion.    Objective:     Vital Signs (Most Recent):  Temp: 98.4 °F (36.9 °C) (12/09/22 0723)  Pulse: 79 (12/09/22 1116)  Resp: 20 (12/09/22 1116)  BP: 124/67 (12/09/22 1116)  SpO2: 100 % (12/09/22 1116) Vital Signs (24h Range):  Temp:  [97.7 °F (36.5 °C)-99 °F (37.2 °C)] 98.4 °F (36.9 °C)  Pulse:  [61-82] 79  Resp:  [18-20] 20  SpO2:  [94 %-100 %] 100 %  BP: (116-134)/(64-86) 124/67     Weight: 56.7 kg (125 lb)  Body mass index is 22.86 kg/m².    Intake/Output Summary (Last 24 hours) at 12/9/2022 1321  Last data filed at 12/8/2022 2100  Gross per 24 hour   Intake 0 ml   Output 900 ml   Net -900 ml      Physical Exam  Constitutional:       General: She is not in acute distress.     Appearance: Normal appearance.   HENT:      Head: Normocephalic and atraumatic.   Eyes:      Extraocular Movements: Extraocular movements intact.      Pupils: Pupils are equal, round, and reactive to light.   Cardiovascular:      Rate and Rhythm: Normal rate and regular rhythm.      Pulses: Normal pulses.      Heart sounds: Normal heart sounds. No murmur heard.    No friction rub. No gallop.   Pulmonary:      Effort: Pulmonary effort is normal.      Breath sounds: Normal breath sounds. No wheezing, rhonchi or rales.   Abdominal:      General: Abdomen is flat. Bowel sounds are normal. There is no distension.      Palpations: Abdomen is soft.      Tenderness: There is no abdominal tenderness.   Musculoskeletal:         General: No swelling or tenderness. Normal range of motion.      Cervical back: Normal range of motion  and neck supple. No tenderness.      Right lower leg: No edema.      Left lower leg: No edema.   Lymphadenopathy:      Cervical: No cervical adenopathy.   Skin:     Findings: No lesion or rash.   Neurological:      General: No focal deficit present.      Mental Status: She is alert and oriented to person, place, and time.      Cranial Nerves: No cranial nerve deficit.      Sensory: No sensory deficit.      Motor: No weakness.       Significant Labs: All pertinent labs within the past 24 hours have been reviewed.    Significant Imaging: I have reviewed all pertinent imaging results/findings within the past 24 hours.      Assessment/Plan:      * Dystonia  Resolved  Neurology signed off  Possible side effect to medication, holding all sedatives  CT and  CTA head negative   EEG unremarkable  PT consulted       Encephalopathy acute  Improving   Likely secondary to sedation  No neuro deficits   Speech therapy consulted, awaiting MBS        Hypernatremia  Due to volume depletion, improving    Continue IV fluids to D5 half NS at 75 cc/h       Chronic atrial fibrillation  Rate controlled  Restarted home digoxin and metoprolol per NG tube   Lovenox 1 mg/kg started for anticoagulation       Delirium  Secondary to polypharmacy  Resolved  Continue to hold all sedatives       Dysphagia  Patient had issues prior to hospitalization, following with GI  GI consulted  Speech therapy following, did MBS today, recommend to keep NPO  Patient agrees to NG tube placement, tube feeds started   Will make final decision on PEG tube       Hypertension  Controlled with home meds   Restarted home meds, NG tube placed   Hydralazine PRN         VTE Risk Mitigation (From admission, onward)         Ordered     enoxaparin injection 60 mg  Every 12 hours (non-standard times)         12/06/22 1334     Place TATIANA hose  Until discontinued         12/04/22 0358     IP VTE LOW RISK PATIENT  Once         12/04/22 0358                Discharge Planning   DEYANIRA:       Code Status: Full Code   Is the patient medically ready for discharge?:     Reason for patient still in hospital (select all that apply): Treatment  Discharge Plan A: Skilled Nursing Facility                  Allie Baer MD  Department of Hospital Medicine   [facility] - 3rd [facility] Medical Telemetry

## 2022-12-10 PROBLEM — M79.673 FOOT PAIN: Status: ACTIVE | Noted: 2022-12-10

## 2022-12-10 PROCEDURE — 99233 SBSQ HOSP IP/OBS HIGH 50: CPT | Mod: ,,, | Performed by: INTERNAL MEDICINE

## 2022-12-10 PROCEDURE — 97530 THERAPEUTIC ACTIVITIES: CPT | Mod: CQ

## 2022-12-10 PROCEDURE — 21400001 HC TELEMETRY ROOM

## 2022-12-10 PROCEDURE — 94761 N-INVAS EAR/PLS OXIMETRY MLT: CPT

## 2022-12-10 PROCEDURE — 27100171 HC OXYGEN HIGH FLOW UP TO 24 HOURS

## 2022-12-10 PROCEDURE — 63600175 PHARM REV CODE 636 W HCPCS: Performed by: PSYCHIATRY & NEUROLOGY

## 2022-12-10 PROCEDURE — 25000003 PHARM REV CODE 250: Performed by: STUDENT IN AN ORGANIZED HEALTH CARE EDUCATION/TRAINING PROGRAM

## 2022-12-10 PROCEDURE — 63600175 PHARM REV CODE 636 W HCPCS: Performed by: INTERNAL MEDICINE

## 2022-12-10 PROCEDURE — 99233 PR SUBSEQUENT HOSPITAL CARE,LEVL III: ICD-10-PCS | Mod: ,,, | Performed by: INTERNAL MEDICINE

## 2022-12-10 RX ORDER — POTASSIUM CHLORIDE 14.9 MG/ML
40 INJECTION INTRAVENOUS ONCE
Status: COMPLETED | OUTPATIENT
Start: 2022-12-10 | End: 2022-12-10

## 2022-12-10 RX ADMIN — POTASSIUM CHLORIDE 20 MEQ: 14.9 INJECTION, SOLUTION INTRAVENOUS at 02:12

## 2022-12-10 RX ADMIN — ENOXAPARIN SODIUM 60 MG: 80 INJECTION SUBCUTANEOUS at 02:12

## 2022-12-10 RX ADMIN — METOPROLOL TARTRATE 12.5 MG: 25 TABLET, FILM COATED ORAL at 08:12

## 2022-12-10 RX ADMIN — ENOXAPARIN SODIUM 60 MG: 80 INJECTION SUBCUTANEOUS at 05:12

## 2022-12-10 RX ADMIN — METOPROLOL TARTRATE 12.5 MG: 25 TABLET, FILM COATED ORAL at 09:12

## 2022-12-10 RX ADMIN — DIGOXIN 0.12 MG: 125 TABLET ORAL at 09:12

## 2022-12-10 RX ADMIN — FUROSEMIDE 40 MG: 40 TABLET ORAL at 09:12

## 2022-12-10 RX ADMIN — ACETAMINOPHEN 650 MG: 650 SOLUTION ORAL at 01:12

## 2022-12-10 NOTE — SUBJECTIVE & OBJECTIVE
Interval History: She reports that she can't bear weight on her right foot since her epidose of delirium.  No other complaints. She has decided to do the PEG tube.    Review of Systems  Objective:     Vital Signs (Most Recent):  Temp: 99.1 °F (37.3 °C) (12/10/22 1130)  Pulse: 85 (12/10/22 1130)  Resp: (!) 25 (12/10/22 1130)  BP: 106/67 (12/10/22 1130)  SpO2: 98 % (12/10/22 1130)   Vital Signs (24h Range):  Temp:  [97.6 °F (36.4 °C)-99.1 °F (37.3 °C)] 99.1 °F (37.3 °C)  Pulse:  [69-85] 85  Resp:  [18-25] 25  SpO2:  [94 %-98 %] 98 %  BP: (106-138)/(56-72) 106/67     Weight: 56.7 kg (125 lb)  Body mass index is 22.86 kg/m².    Intake/Output Summary (Last 24 hours) at 12/10/2022 1425  Last data filed at 12/10/2022 1351  Gross per 24 hour   Intake 0 ml   Output 1100 ml   Net -1100 ml      Physical Exam  Constitutional:       General: She is not in acute distress.     Appearance: Normal appearance.   HENT:      Head: Normocephalic and atraumatic.   Eyes:      General: No scleral icterus.     Conjunctiva/sclera: Conjunctivae normal.   Neck:      Vascular: No carotid bruit.   Cardiovascular:      Rate and Rhythm: Normal rate and regular rhythm.      Pulses: Normal pulses.      Heart sounds: Normal heart sounds. No murmur heard.    No friction rub. No gallop.   Pulmonary:      Effort: Pulmonary effort is normal.      Breath sounds: Normal breath sounds.   Abdominal:      General: Bowel sounds are normal.      Palpations: Abdomen is soft. There is no mass.      Tenderness: There is no abdominal tenderness. There is no guarding or rebound.   Musculoskeletal:         General: No deformity.      Cervical back: No rigidity or tenderness.      Right lower leg: No edema.      Left lower leg: No edema.      Comments: Some ttp on the top of her foot.  No obvious deformity, redness, or swelling.   Lymphadenopathy:      Cervical: No cervical adenopathy.   Skin:     Coloration: Skin is not jaundiced or pale.      Findings: No erythema.    Neurological:      General: No focal deficit present.      Mental Status: She is alert and oriented to person, place, and time.      Gait: Gait normal.   Psychiatric:         Mood and Affect: Mood normal.         Behavior: Behavior normal.         Thought Content: Thought content normal.         Judgment: Judgment normal.       Significant Labs: All pertinent labs within the past 24 hours have been reviewed.    Significant Imaging: I have reviewed all pertinent imaging results/findings within the past 24 hours.

## 2022-12-10 NOTE — ASSESSMENT & PLAN NOTE
Rate controlled  Restarted home digoxin and metoprolol per NG tube   Lovenox 1 mg/kg started for anticoagulation pending peg placement Monday

## 2022-12-10 NOTE — PT/OT/SLP PROGRESS
Physical Therapy Treatment    Patient Name:  Judith Ramirez   MRN:  [id number]    Recommendations:     Discharge Recommendations:  nursing facility, skilled   Discharge Equipment Recommendations: none     Subjective     Patient awake.     Communicated with NSG prior to session to see if Pt is appropriate for therapy today. Pt greeted and agreed to session.    Objective:     General Precautions: Standard, aspiration   Orthopedic Precautions:    Braces:    Respiratory Status: Nasal cannula, flow 3 L/min     Functional Mobility:    Bed Mobility: Min Assist  Sit to Stand: Min Assist  Transfers: Min Assist    Equipment Used: Gait belt, Rolling walker    Assessment:     Judith Ramirez is a 88 y.o. female admitted with a medical diagnosis of Dystonia.     Treatment Encounter Note:  Patient actively participated with treatment today with no adverse effects. Patient performed supine to EOB mobility. Pt tolerated sitting EOB x 16 mins. Patient left  with all lines intact and call button in reach.    Rehab Prognosis: Good; patient would benefit from acute skilled PT services to address these deficits and reach maximum level of function.    Recent Surgery: Procedure(s) (LRB):  EGD, WITH PEG TUBE INSERTION (N/A)    GOALS:   Multidisciplinary Problems       Physical Therapy Goals          Problem: Physical Therapy    Goal Priority Disciplines Outcome Goal Variances Interventions   Physical Therapy Goal     PT, PT/OT Ongoing, Progressing     Description: Goals to be met by: 2023     Patient will increase functional independence with mobility by performin. Supine to sit with Gregory  2. Sit to stand transfer with Gregory  3. Bed to chair transfer with Modified Gregory using Rolling Walker  4. Gait  x 400 feet with Modified Gregory using Rolling Walker.                          Plan:     During this hospitalization, patient to be seen 6 x/week to address the identified rehab impairments via therapeutic  activities, gait training, therapeutic exercises and progress toward the following goals:    Plan of Care Expires:       Billable Minutes     Billable Minutes: Therapeutic Activity 23    Treatment Type: Treatment  PT/PTA: PTA     PTA Visit Number: 1     12/10/2022

## 2022-12-10 NOTE — PROGRESS NOTES
[facility] - 3rd [facility] Medicine  Progress Note    Patient Name: Judith Ramirez  MRN: [id number]  Patient Class: IP- Inpatient   Admission Date: 12/4/2022  Length of Stay: 6 days  Attending Physician: Allie Etienne MD  Primary Care Provider: Allie Baer MD        Subjective:     Principal Problem:Dystonia        HPI:  Ms Wong is a 87 y/o female patient with past medical history significant for hypertension, hyperlipidemia, atrial fibrillation, COPD, restless leg syndrome who presented to ED Friday night due to uncontrolled movements and agitation. History is obtained from son since patient is sedated at the moment of exam. Son states they first started noticing symptoms Friday afternoon. They brought patient to ED in [place] where she was given IV fluids and pramipexole was discontinued due to possible side effect of this medication. Son states patient was discharged from ED and a few hours later continued to present same symptoms therefore they [place] [facility] [place] In [place] patient was very agitated and presented with hallucinations. Son states the only change in medications is an increase in baclofen dose. Patient continued to be agitated since admission until this morning where she had an acute change, has been sedated, patient has received several doses of Ativan as well fentanyl x1, Benadryl and Robaxin.       Overview/Hospital Course:  No notes on file    Interval History: She reports that she can't bear weight on her right foot since her epidose of delirium.  No other complaints. She has decided to do the PEG tube.    Review of Systems  Objective:     Vital Signs (Most Recent):  Temp: 99.1 °F (37.3 °C) (12/10/22 1130)  Pulse: 85 (12/10/22 1130)  Resp: (!) 25 (12/10/22 1130)  BP: 106/67 (12/10/22 1130)  SpO2: 98 % (12/10/22 1130)   Vital Signs (24h Range):  Temp:  [97.6 °F (36.4 °C)-99.1 °F (37.3 °C)] 99.1 °F (37.3 °C)  Pulse:  [69-85]  85  Resp:  [18-25] 25  SpO2:  [94 %-98 %] 98 %  BP: (106-138)/(56-72) 106/67     Weight: 56.7 kg (125 lb)  Body mass index is 22.86 kg/m².    Intake/Output Summary (Last 24 hours) at 12/10/2022 1425  Last data filed at 12/10/2022 1351  Gross per 24 hour   Intake 0 ml   Output 1100 ml   Net -1100 ml      Physical Exam  Constitutional:       General: She is not in acute distress.     Appearance: Normal appearance.   HENT:      Head: Normocephalic and atraumatic.   Eyes:      General: No scleral icterus.     Conjunctiva/sclera: Conjunctivae normal.   Neck:      Vascular: No carotid bruit.   Cardiovascular:      Rate and Rhythm: Normal rate and regular rhythm.      Pulses: Normal pulses.      Heart sounds: Normal heart sounds. No murmur heard.    No friction rub. No gallop.   Pulmonary:      Effort: Pulmonary effort is normal.      Breath sounds: Normal breath sounds.   Abdominal:      General: Bowel sounds are normal.      Palpations: Abdomen is soft. There is no mass.      Tenderness: There is no abdominal tenderness. There is no guarding or rebound.   Musculoskeletal:         General: No deformity.      Cervical back: No rigidity or tenderness.      Right lower leg: No edema.      Left lower leg: No edema.      Comments: Some ttp on the top of her foot.  No obvious deformity, redness, or swelling.   Lymphadenopathy:      Cervical: No cervical adenopathy.   Skin:     Coloration: Skin is not jaundiced or pale.      Findings: No erythema.   Neurological:      General: No focal deficit present.      Mental Status: She is alert and oriented to person, place, and time.      Gait: Gait normal.   Psychiatric:         Mood and Affect: Mood normal.         Behavior: Behavior normal.         Thought Content: Thought content normal.         Judgment: Judgment normal.       Significant Labs: All pertinent labs within the past 24 hours have been reviewed.    Significant Imaging: I have reviewed all pertinent imaging  results/findings within the past 24 hours.      Assessment/Plan:      * Dystonia  Resolved  Neurology signed off  Possible side effect to medication, holding all sedatives  CT and  CTA head negative   EEG unremarkable  PT consulted       Foot pain  Xray doesn't show a fracture.  Check uric acid level.      Encephalopathy acute  Resolved        Hypernatremia  Stop fluids and monitor.      Chronic atrial fibrillation  Rate controlled  Restarted home digoxin and metoprolol per NG tube   Lovenox 1 mg/kg started for anticoagulation pending peg placement Monday      Delirium  Secondary to polypharmacy  Resolved  Continue to hold all sedatives       Dysphagia  PEG placement on Monday.  Cont NG with TF for now.      Pulmonary emphysema  She is on chronic home oxygen.      Hypertension  Controlled with home meds   Restarted home meds, NG tube placed   Hydralazine PRN         VTE Risk Mitigation (From admission, onward)         Ordered     enoxaparin injection 60 mg  Every 12 hours (non-standard times)         12/06/22 1334     Place TATIANA hose  Until discontinued         12/04/22 0358     IP VTE LOW RISK PATIENT  Once         12/04/22 0358                Discharge Planning   DEYANIRA:      Code Status: Full Code   Is the patient medically ready for discharge?:     Reason for patient still in hospital (select all that apply): Treatment  Discharge Plan A: Skilled Nursing Facility                  Laura Barry MD  Department of Hospital Medicine   [facility] - 3rd Floor Medical Telemetry

## 2022-12-11 ENCOUNTER — ANESTHESIA EVENT (OUTPATIENT)
Dept: ENDOSCOPY | Facility: HOSPITAL | Age: 87
DRG: 091 | End: 2022-12-11
Payer: MEDICARE

## 2022-12-11 LAB
ANION GAP SERPL CALC-SCNC: 8 MEQ/L
BUN SERPL-MCNC: 16.6 MG/DL (ref 9.8–20.1)
CALCIUM SERPL-MCNC: 8.9 MG/DL (ref 8.4–10.2)
CHLORIDE SERPL-SCNC: 102 MMOL/L (ref 98–107)
CO2 SERPL-SCNC: 30 MMOL/L (ref 23–31)
CREAT SERPL-MCNC: 0.61 MG/DL (ref 0.55–1.02)
CREAT/UREA NIT SERPL: 27
GFR SERPLBLD CREATININE-BSD FMLA CKD-EPI: >60 MLS/MIN/1.73/M2
GLUCOSE SERPL-MCNC: 143 MG/DL (ref 82–115)
MAGNESIUM SERPL-MCNC: 2.1 MG/DL (ref 1.6–2.6)
PHOSPHATE SERPL-MCNC: 3.2 MG/DL (ref 2.3–4.7)
POTASSIUM SERPL-SCNC: 3.6 MMOL/L (ref 3.5–5.1)
SODIUM SERPL-SCNC: 140 MMOL/L (ref 136–145)
URATE SERPL-MCNC: 4.3 MG/DL (ref 2.6–6)

## 2022-12-11 PROCEDURE — 36415 COLL VENOUS BLD VENIPUNCTURE: CPT | Performed by: INTERNAL MEDICINE

## 2022-12-11 PROCEDURE — 21400001 HC TELEMETRY ROOM

## 2022-12-11 PROCEDURE — 99232 PR SUBSEQUENT HOSPITAL CARE,LEVL II: ICD-10-PCS | Mod: ,,, | Performed by: INTERNAL MEDICINE

## 2022-12-11 PROCEDURE — 83735 ASSAY OF MAGNESIUM: CPT | Performed by: INTERNAL MEDICINE

## 2022-12-11 PROCEDURE — 27000221 HC OXYGEN, UP TO 24 HOURS

## 2022-12-11 PROCEDURE — 25000003 PHARM REV CODE 250: Performed by: INTERNAL MEDICINE

## 2022-12-11 PROCEDURE — 25000003 PHARM REV CODE 250: Performed by: STUDENT IN AN ORGANIZED HEALTH CARE EDUCATION/TRAINING PROGRAM

## 2022-12-11 PROCEDURE — 84550 ASSAY OF BLOOD/URIC ACID: CPT | Performed by: INTERNAL MEDICINE

## 2022-12-11 PROCEDURE — 80048 BASIC METABOLIC PNL TOTAL CA: CPT | Performed by: INTERNAL MEDICINE

## 2022-12-11 PROCEDURE — 84100 ASSAY OF PHOSPHORUS: CPT | Performed by: INTERNAL MEDICINE

## 2022-12-11 PROCEDURE — 63600175 PHARM REV CODE 636 W HCPCS: Performed by: PSYCHIATRY & NEUROLOGY

## 2022-12-11 PROCEDURE — 99232 SBSQ HOSP IP/OBS MODERATE 35: CPT | Mod: ,,, | Performed by: INTERNAL MEDICINE

## 2022-12-11 RX ORDER — ONDANSETRON 2 MG/ML
4 INJECTION INTRAMUSCULAR; INTRAVENOUS ONCE AS NEEDED
Status: CANCELLED | OUTPATIENT
Start: 2022-12-11 | End: 2034-05-09

## 2022-12-11 RX ORDER — HYDROMORPHONE HYDROCHLORIDE 2 MG/ML
0.2 INJECTION, SOLUTION INTRAMUSCULAR; INTRAVENOUS; SUBCUTANEOUS EVERY 5 MIN PRN
Status: CANCELLED | OUTPATIENT
Start: 2022-12-11

## 2022-12-11 RX ORDER — SODIUM CHLORIDE 9 MG/ML
INJECTION, SOLUTION INTRAVENOUS CONTINUOUS
Status: DISCONTINUED | OUTPATIENT
Start: 2022-12-11 | End: 2022-12-13 | Stop reason: HOSPADM

## 2022-12-11 RX ADMIN — FUROSEMIDE 40 MG: 40 TABLET ORAL at 08:12

## 2022-12-11 RX ADMIN — SODIUM CHLORIDE: 9 INJECTION, SOLUTION INTRAVENOUS at 11:12

## 2022-12-11 RX ADMIN — ENOXAPARIN SODIUM 60 MG: 80 INJECTION SUBCUTANEOUS at 02:12

## 2022-12-11 RX ADMIN — METOPROLOL TARTRATE 12.5 MG: 25 TABLET, FILM COATED ORAL at 08:12

## 2022-12-11 RX ADMIN — DIGOXIN 0.12 MG: 125 TABLET ORAL at 08:12

## 2022-12-11 NOTE — ANESTHESIA PREPROCEDURE EVALUATION
12/11/2022  Judith Ramirez is a 88 y.o., female , who presents for the following:    Procedure: EGD, WITH PEG TUBE INSERTION (Abdomen)   Anesthesia type: General/MAC   Diagnosis: Dysphagia, unspecified type [R13.10]   Location: [facility] ENDO 02 / [facility] ENDOSCOPY   Surgeons: Jo Ann Montenegro III, MD         HPI:      This is an 87 yo CF known to Dr. 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     Patient is admitted with delirium/encephalopathy and dystonia felt secondary to possible medication side effect. GI service consulted for dysphagia.      Patient was recently admitted at [facility] 11/18/22 with dysphagia and food bolus.  She has intermittent esophageal dysphagia at baseline with occurrences of food impaction.  Clinical history of esophageal dysmotility (IEM) according to HR manometry done by Dr. Diaz in 2019.   EGD 11/18/22 found a large amount of soft food in the esophagus requiring removal which was accomplished.  No obvious stricture but did appear to have dysmotility.  Mild esophagitis from food bolus but otherwise no concerning findings. Patient was seen Dr. Diaz in clinic after discharge.  Esophagram was performed on 11/28 which found severe esophageal dysmotility with tertiary contractions and retention of contrast in the esophagus, no significant stricture evident, swallowed barium tablet passed into the stomach after a weight of a few seconds.  This exam appeared worsened compared to 2019.  Recommendations were to increase baclofen to 10 mg p.o. b.i.d..  She was previously on 5 mg.     Patient had MBS today which found moderate oropharyngeal dysphagia and  is at HIGH risk for aspiration and associated complications.          Pre-op Assessment    I have reviewed the Patient Summary Reports.     I have reviewed the Nursing Notes. I have reviewed the NPO Status.   I have reviewed the Medications.     Review of Systems  Anesthesia Hx:  No problems with previous Anesthesia  Denies Family Hx of Anesthesia complications.   Denies Personal Hx of Anesthesia complications.   Social:  Former Smoker    Cardiovascular:   Hypertension CAD  Dysrhythmias  hyperlipidemia    Pulmonary:   COPD Home O2   Hepatic/GI:   GERD    Psych:   Delerium         Physical Exam  General: Alert and Oriented    Airway:  Mallampati: II   Mouth Opening: Normal  TM Distance: Normal  Tongue: Normal  Neck ROM: Normal ROM    Chest/Lungs:  Normal Respiratory Rate    Heart:  Rate: Normal  Rhythm: Regular Rhythm        Anesthesia Plan  Type of Anesthesia, risks & benefits discussed:    Anesthesia Type: Gen Natural Airway  Intra-op Monitoring Plan: Standard ASA Monitors  Post Op Pain Control Plan: IV/PO Opioids PRN  Induction:  IV  Airway Plan: Direct  Informed Consent: Informed consent signed with the Patient and all parties understand the risks and agree with anesthesia plan.  All questions answered. Patient consented to blood products? No  ASA Score: 4  Day of Surgery Review of History & Physical: H&P Update referred to the surgeon/provider.  Anesthesia Plan Notes: Nasal cannula vs facemask supplemental oxygenation   For patients with PAOLA/obesity, may consider SuperNoval Nasal CPAP      Ready For Surgery From Anesthesia Perspective.     .

## 2022-12-11 NOTE — PROGRESS NOTES
[facility] - 3rd [facility] Medicine  Progress Note    Patient Name: Judith Ramirez  MRN: [id number]  Patient Class: IP- Inpatient   Admission Date: 12/4/2022  Length of Stay: 7 days  Attending Physician: Allie Etienne MD  Primary Care Provider: Allie Baer MD        Subjective:     Principal Problem:Dystonia        HPI:  Ms Wong is a 89 y/o female patient with past medical history significant for hypertension, hyperlipidemia, atrial fibrillation, COPD, restless leg syndrome who presented to ED Friday night due to uncontrolled movements and agitation. History is obtained from son since patient is sedated at the moment of exam. Son states they first started noticing symptoms Friday afternoon. They brought patient to ED in [place] where she was given IV fluids and pramipexole was discontinued due to possible side effect of this medication. Son states patient was discharged from ED and a few hours later continued to present same symptoms [place] [facility] [place] was very agitated and presented with hallucinations. Son states the only change in medications is an increase in baclofen dose. Patient continued to be agitated since admission until this morning where she had an acute change, has been sedated, patient has received several doses of Ativan as well fentanyl x1, Benadryl and Robaxin.       Overview/Hospital Course:  No notes on file    Interval History: No new complaints.  Foot pain seems a little better.    Review of Systems  Objective:     Vital Signs (Most Recent):  Temp: 97.8 °F (36.6 °C) (12/11/22 1102)  Pulse: 78 (12/11/22 1102)  Resp: 17 (12/11/22 0337)  BP: 116/69 (12/11/22 1102)  SpO2: 95 % (12/11/22 1102) Vital Signs (24h Range):  Temp:  [97.8 °F (36.6 °C)-98.6 °F (37 °C)] 97.8 °F (36.6 °C)  Pulse:  [76-86] 78  Resp:  [17-18] 17  SpO2:  [94 %-99 %] 95 %  BP: (113-128)/(63-78) 116/69     Weight: 56.7 kg (125  lb)  Body mass index is 22.86 kg/m².    Intake/Output Summary (Last 24 hours) at 12/11/2022 1459  Last data filed at 12/10/2022 2300  Gross per 24 hour   Intake 0 ml   Output 900 ml   Net -900 ml      Physical Exam  Constitutional:       General: She is not in acute distress.     Appearance: Normal appearance.   HENT:      Head: Normocephalic and atraumatic.   Eyes:      General: No scleral icterus.     Conjunctiva/sclera: Conjunctivae normal.   Neck:      Vascular: No carotid bruit.   Cardiovascular:      Rate and Rhythm: Normal rate. Rhythm irregular.      Pulses: Normal pulses.      Heart sounds: Normal heart sounds. No murmur heard.    No friction rub. No gallop.   Pulmonary:      Effort: Pulmonary effort is normal.      Breath sounds: Normal breath sounds.   Abdominal:      General: Bowel sounds are normal.      Palpations: Abdomen is soft. There is no mass.      Tenderness: There is no abdominal tenderness. There is no guarding or rebound.      Comments: Has NG     Musculoskeletal:         General: No deformity.      Cervical back: No rigidity or tenderness.      Right lower leg: No edema.      Left lower leg: No edema.   Lymphadenopathy:      Cervical: No cervical adenopathy.   Skin:     Coloration: Skin is not jaundiced or pale.      Findings: No erythema.   Neurological:      General: No focal deficit present.      Mental Status: She is alert and oriented to person, place, and time.      Gait: Gait normal.   Psychiatric:         Mood and Affect: Mood normal.         Behavior: Behavior normal.         Thought Content: Thought content normal.         Judgment: Judgment normal.       Significant Labs: All pertinent labs within the past 24 hours have been reviewed.    Significant Imaging: I have reviewed all pertinent imaging results/findings within the past 24 hours.      Assessment/Plan:      * Dystonia  Resolved  Neurology signed off  Possible side effect to medication, holding all sedatives  CT and  CTA  head negative   EEG unremarkable  PT consulted       Foot pain  Xray doesn't show a fracture. Uric acid level was normal.  Seems to be improving.      Encephalopathy acute  Resolved        Hypernatremia  Stop fluids and monitor.      Chronic atrial fibrillation  Rate controlled  Restarted home digoxin and metoprolol per NG tube   Lovenox 1 mg/kg started for anticoagulation pending peg placement Monday      Delirium  Secondary to polypharmacy  Resolved  Continue to hold all sedatives       Dysphagia  PEG placement on Monday.  Cont NG with TF for now.      Pulmonary emphysema  She is on chronic home oxygen.      Hypertension  Controlled with home meds   Restarted home meds, NG tube placed   Hydralazine PRN         VTE Risk Mitigation (From admission, onward)         Ordered     enoxaparin injection 60 mg  Every 12 hours (non-standard times)         12/06/22 1334     Place TATIANA hose  Until discontinued         12/04/22 0358     IP VTE LOW RISK PATIENT  Once         12/04/22 0358                Discharge Planning   DEYANIRA:      Code Status: Full Code   Is the patient medically ready for discharge?:     Reason for patient still in hospital (select all that apply): Treatment  Discharge Plan A: Skilled Nursing Facility                  Laura Barry MD  Department of Hospital Medicine   [facility] - 3rd Floor Medical Telemetry

## 2022-12-11 NOTE — SUBJECTIVE & OBJECTIVE
Interval History: No new complaints.  Foot pain seems a little better.    Review of Systems  Objective:     Vital Signs (Most Recent):  Temp: 97.8 °F (36.6 °C) (12/11/22 1102)  Pulse: 78 (12/11/22 1102)  Resp: 17 (12/11/22 0337)  BP: 116/69 (12/11/22 1102)  SpO2: 95 % (12/11/22 1102) Vital Signs (24h Range):  Temp:  [97.8 °F (36.6 °C)-98.6 °F (37 °C)] 97.8 °F (36.6 °C)  Pulse:  [76-86] 78  Resp:  [17-18] 17  SpO2:  [94 %-99 %] 95 %  BP: (113-128)/(63-78) 116/69     Weight: 56.7 kg (125 lb)  Body mass index is 22.86 kg/m².    Intake/Output Summary (Last 24 hours) at 12/11/2022 1459  Last data filed at 12/10/2022 2300  Gross per 24 hour   Intake 0 ml   Output 900 ml   Net -900 ml      Physical Exam  Constitutional:       General: She is not in acute distress.     Appearance: Normal appearance.   HENT:      Head: Normocephalic and atraumatic.   Eyes:      General: No scleral icterus.     Conjunctiva/sclera: Conjunctivae normal.   Neck:      Vascular: No carotid bruit.   Cardiovascular:      Rate and Rhythm: Normal rate. Rhythm irregular.      Pulses: Normal pulses.      Heart sounds: Normal heart sounds. No murmur heard.    No friction rub. No gallop.   Pulmonary:      Effort: Pulmonary effort is normal.      Breath sounds: Normal breath sounds.   Abdominal:      General: Bowel sounds are normal.      Palpations: Abdomen is soft. There is no mass.      Tenderness: There is no abdominal tenderness. There is no guarding or rebound.      Comments: Has NG     Musculoskeletal:         General: No deformity.      Cervical back: No rigidity or tenderness.      Right lower leg: No edema.      Left lower leg: No edema.   Lymphadenopathy:      Cervical: No cervical adenopathy.   Skin:     Coloration: Skin is not jaundiced or pale.      Findings: No erythema.   Neurological:      General: No focal deficit present.      Mental Status: She is alert and oriented to person, place, and time.      Gait: Gait normal.   Psychiatric:          Mood and Affect: Mood normal.         Behavior: Behavior normal.         Thought Content: Thought content normal.         Judgment: Judgment normal.       Significant Labs: All pertinent labs within the past 24 hours have been reviewed.    Significant Imaging: I have reviewed all pertinent imaging results/findings within the past 24 hours.

## 2022-12-12 ENCOUNTER — ANESTHESIA (OUTPATIENT)
Dept: ENDOSCOPY | Facility: HOSPITAL | Age: 87
DRG: 091 | End: 2022-12-12
Payer: MEDICARE

## 2022-12-12 PROCEDURE — 21400001 HC TELEMETRY ROOM

## 2022-12-12 PROCEDURE — 99233 PR SUBSEQUENT HOSPITAL CARE,LEVL III: ICD-10-PCS | Mod: ,,, | Performed by: STUDENT IN AN ORGANIZED HEALTH CARE EDUCATION/TRAINING PROGRAM

## 2022-12-12 PROCEDURE — 99233 SBSQ HOSP IP/OBS HIGH 50: CPT | Mod: ,,, | Performed by: STUDENT IN AN ORGANIZED HEALTH CARE EDUCATION/TRAINING PROGRAM

## 2022-12-12 PROCEDURE — 27000221 HC OXYGEN, UP TO 24 HOURS

## 2022-12-12 PROCEDURE — 25000003 PHARM REV CODE 250: Performed by: INTERNAL MEDICINE

## 2022-12-12 PROCEDURE — 43246 EGD PLACE GASTROSTOMY TUBE: CPT | Performed by: INTERNAL MEDICINE

## 2022-12-12 PROCEDURE — 25000003 PHARM REV CODE 250: Performed by: STUDENT IN AN ORGANIZED HEALTH CARE EDUCATION/TRAINING PROGRAM

## 2022-12-12 PROCEDURE — 25000003 PHARM REV CODE 250: Performed by: NURSE ANESTHETIST, CERTIFIED REGISTERED

## 2022-12-12 PROCEDURE — 37000008 HC ANESTHESIA 1ST 15 MINUTES: Performed by: INTERNAL MEDICINE

## 2022-12-12 PROCEDURE — 63600175 PHARM REV CODE 636 W HCPCS: Performed by: NURSE ANESTHETIST, CERTIFIED REGISTERED

## 2022-12-12 PROCEDURE — 37000009 HC ANESTHESIA EA ADD 15 MINS: Performed by: INTERNAL MEDICINE

## 2022-12-12 RX ORDER — PROPOFOL 10 MG/ML
VIAL (ML) INTRAVENOUS
Status: COMPLETED
Start: 2022-12-12 | End: 2022-12-12

## 2022-12-12 RX ORDER — LIDOCAINE HYDROCHLORIDE 10 MG/ML
1 INJECTION, SOLUTION EPIDURAL; INFILTRATION; INTRACAUDAL; PERINEURAL ONCE
Status: DISCONTINUED | OUTPATIENT
Start: 2022-12-12 | End: 2022-12-13 | Stop reason: HOSPADM

## 2022-12-12 RX ORDER — SODIUM CHLORIDE, SODIUM GLUCONATE, SODIUM ACETATE, POTASSIUM CHLORIDE AND MAGNESIUM CHLORIDE 30; 37; 368; 526; 502 MG/100ML; MG/100ML; MG/100ML; MG/100ML; MG/100ML
INJECTION, SOLUTION INTRAVENOUS CONTINUOUS
Status: DISCONTINUED | OUTPATIENT
Start: 2022-12-12 | End: 2022-12-13 | Stop reason: HOSPADM

## 2022-12-12 RX ORDER — PROPOFOL 10 MG/ML
VIAL (ML) INTRAVENOUS CONTINUOUS PRN
Status: DISCONTINUED | OUTPATIENT
Start: 2022-12-12 | End: 2022-12-12

## 2022-12-12 RX ORDER — LEVOFLOXACIN 5 MG/ML
INJECTION, SOLUTION INTRAVENOUS
Status: DISCONTINUED | OUTPATIENT
Start: 2022-12-12 | End: 2022-12-12

## 2022-12-12 RX ORDER — LEVOFLOXACIN 5 MG/ML
INJECTION, SOLUTION INTRAVENOUS
Status: COMPLETED
Start: 2022-12-12 | End: 2022-12-12

## 2022-12-12 RX ADMIN — SODIUM CHLORIDE, SODIUM GLUCONATE, SODIUM ACETATE, POTASSIUM CHLORIDE AND MAGNESIUM CHLORIDE: 526; 502; 368; 37; 30 INJECTION, SOLUTION INTRAVENOUS at 09:12

## 2022-12-12 RX ADMIN — SODIUM CHLORIDE: 9 INJECTION, SOLUTION INTRAVENOUS at 03:12

## 2022-12-12 RX ADMIN — METOPROLOL TARTRATE 12.5 MG: 25 TABLET, FILM COATED ORAL at 08:12

## 2022-12-12 RX ADMIN — PROPOFOL 150 MCG/KG/MIN: 10 INJECTION, EMULSION INTRAVENOUS at 09:12

## 2022-12-12 RX ADMIN — LEVOFLOXACIN 500 MG: 500 INJECTION, SOLUTION INTRAVENOUS at 09:12

## 2022-12-12 NOTE — TRANSFER OF CARE
"Anesthesia Transfer of Care Note    Patient: Judith Ramirez    Procedure(s) Performed: Procedure(s) (LRB):  EGD, WITH PEG TUBE INSERTION (N/A)    Patient location: GI    Anesthesia Type: general    Transport from OR: Transported from OR on room air with adequate spontaneous ventilation    Post pain: adequate analgesia    Post assessment: no apparent anesthetic complications and tolerated procedure well    Post vital signs: stable    Level of consciousness: responds to stimulation    Nausea/Vomiting: no nausea/vomiting    Complications: none    Transfer of care protocol was followed      Last vitals:   Visit Vitals  BP (!) 88/63   Pulse 93   Temp 36 °C (96.8 °F) (Tympanic)   Resp (!) 32   Ht 5' 2" (1.575 m)   Wt 56.7 kg (125 lb)   SpO2 97%   BMI 22.86 kg/m²     "

## 2022-12-12 NOTE — PROVATION PATIENT INSTRUCTIONS
Discharge Summary/Instructions after an Endoscopic Procedure  Patient Name: Judith Ramirez  Patient MRN: [id number]  Patient YOB: 1934  Monday, December 12, 2022  Jo Ann Montenegro III, MD  Dear patient,  As a result of recent federal legislation (The Federal Cures Act), you may   receive lab or pathology results from your procedure in your [facility]   account before your physician is able to contact you. Your physician or   their representative will relay the results to you with their   recommendations at their soonest availability.  Thank you,  RESTRICTIONS:  During your procedure today, you received medications for sedation.  These   medications may affect your judgment, balance and coordination.  Therefore,   for 24 hours, you have the following restrictions:   - DO NOT drive a car, operate machinery, make legal/financial decisions,   sign important papers or drink alcohol.    ACTIVITY:  Today: no heavy lifting, straining or running due to procedural   sedation/anesthesia.  The following day: return to full activity including work.  DIET:  Eat and drink normally unless instructed otherwise.     TREATMENT FOR COMMON SIDE EFFECTS:  - Mild abdominal pain, nausea, belching, bloating or excessive gas:  rest,   eat lightly and use a heating pad.  - Sore Throat: treat with throat lozenges and/or gargle with warm salt   water.  - Because air was used during the procedure, expelling large amounts of air   from your rectum or belching is normal.  - If a bowel prep was taken, you may not have a bowel movement for 1-3 days.    This is normal.  SYMPTOMS TO WATCH FOR AND REPORT TO YOUR PHYSICIAN:  1. Abdominal pain or bloating, other than gas cramps.  2. Chest pain.  3. Back pain.  4. Signs of infection such as: chills or fever occurring within 24 hours   after the procedure.  5. Rectal bleeding, which would show as bright red, maroon, or black stools.   (A tablespoon of blood from the rectum is not serious, especially  if   hemorrhoids are present.)  6. Vomiting.  7. Weakness or dizziness.  GO DIRECTLY TO THE NEAREST EMERGENCY ROOM IF YOU HAVE ANY OF THE FOLLOWING:      Difficulty breathing              Chills and/or fever over 101 F   Persistent vomiting and/or vomiting blood   Severe abdominal pain   Severe chest pain   Black, tarry stools   Bleeding- more than one tablespoon   Any other symptom or condition that you feel may need urgent attention  Your doctor recommends these additional instructions:  If any biopsies were taken, your doctors clinic will contact you in 1 to 2   weeks with any results.  - Please follow the post-PEG recommendations.   - Please follow the post-PEG recommendations including: may use PEG today   for meds and water and may use PEG tomorrow for feedings.   - Please follow the post-PEG recommendations [Post-PEG Recommendations].  For questions, problems or results please call your physician - Jo Ann Montenegro III, MD at Work:  [phone].  [facility], EMERGENCY ROOM PHONE NUMBER: [phone]  IF A COMPLICATION OR EMERGENCY SITUATION ARISES AND YOU ARE UNABLE TO REACH   YOUR PHYSICIAN - GO DIRECTLY TO THE EMERGENCY ROOM.  Jo Ann Montenegro III, MD  12/12/2022 9:53:28 AM  This report has been verified and signed electronically.  Dear patient,  As a result of recent federal legislation (The Federal Cures Act), you may   receive lab or pathology results from your procedure in your [facility]   account before your physician is able to contact you. Your physician or   their representative will relay the results to you with their   recommendations at their soonest availability.  Thank you,  PROVATION

## 2022-12-12 NOTE — PLAN OF CARE
Problem: Adult Inpatient Plan of Care  Goal: Plan of Care Review  Outcome: Ongoing, Progressing  Flowsheets (Taken 12/11/2022 2129)  Plan of Care Reviewed With: patient  Goal: Patient-Specific Goal (Individualized)  Outcome: Ongoing, Progressing  Goal: Absence of Hospital-Acquired Illness or Injury  Outcome: Ongoing, Progressing  Intervention: Identify and Manage Fall Risk  Flowsheets (Taken 12/11/2022 2129)  Safety Promotion/Fall Prevention:   assistive device/personal item within reach   side rails raised x 3   instructed to call staff for mobility   lighting adjusted   medications reviewed  Intervention: Prevent Skin Injury  Flowsheets (Taken 12/11/2022 2129)  Body Position: 30 degrees  Skin Protection:   adhesive use limited   incontinence pads utilized  Intervention: Prevent and Manage VTE (Venous Thromboembolism) Risk  Flowsheets (Taken 12/11/2022 2129)  Activity Management: Rolling - L1  Intervention: Prevent Infection  Flowsheets (Taken 12/11/2022 2129)  Infection Prevention:   personal protective equipment utilized   environmental surveillance performed   rest/sleep promoted   single patient room provided   hand hygiene promoted  Goal: Optimal Comfort and Wellbeing  Outcome: Ongoing, Progressing  Intervention: Monitor Pain and Promote Comfort  Flowsheets (Taken 12/11/2022 2129)  Pain Management Interventions:   pain management plan reviewed with patient/caregiver   relaxation techniques promoted   position adjusted   pillow support provided   care clustered   breathing exercises utilized  Intervention: Provide Person-Centered Care  Flowsheets (Taken 12/11/2022 2129)  Trust Relationship/Rapport:   questions encouraged   care explained   choices provided   reassurance provided   thoughts/feelings acknowledged   emotional support provided   empathic listening provided   questions answered  Goal: Readiness for Transition of Care  Outcome: Ongoing, Progressing     Problem: Skin Injury Risk Increased  Goal:  Skin Health and Integrity  Outcome: Ongoing, Progressing  Intervention: Optimize Skin Protection  Flowsheets (Taken 12/11/2022 2129)  Pressure Reduction Techniques: frequent weight shift encouraged  Pressure Reduction Devices: positioning supports utilized  Skin Protection:   adhesive use limited   incontinence pads utilized  Head of Bed (HOB) Positioning: HOB at 30 degrees     Problem: Fall Injury Risk  Goal: Absence of Fall and Fall-Related Injury  Outcome: Ongoing, Progressing  Intervention: Identify and Manage Contributors  Flowsheets (Taken 12/11/2022 2129)  Self-Care Promotion:   BADL personal objects within reach   safe use of adaptive equipment encouraged  Medication Review/Management: medications reviewed  Intervention: Promote Injury-Free Environment  Flowsheets (Taken 12/11/2022 2129)  Safety Promotion/Fall Prevention:   assistive device/personal item within reach   side rails raised x 3   instructed to call staff for mobility   lighting adjusted   medications reviewed

## 2022-12-12 NOTE — ANESTHESIA POSTPROCEDURE EVALUATION
Anesthesia Post Evaluation    Patient: Judith Ramirez    Procedure(s) Performed: Procedure(s) (LRB):  EGD, WITH PEG TUBE INSERTION (N/A)    Final Anesthesia Type: general      Patient location during evaluation: GI PACU  Patient participation: Yes- Able to Participate  Level of consciousness: awake and oriented  Post-procedure vital signs: reviewed and stable  Pain management: adequate  Airway patency: patent    PONV status at discharge: No PONV  Anesthetic complications: no      Cardiovascular status: stable and hemodynamically stable  Respiratory status: unassisted and spontaneous ventilation  Hydration status: euvolemic  Follow-up not needed.          Vitals Value Taken Time   BP 95/62 12/12/22 1021   Temp 36 12/12/22 1424   Pulse 88 12/12/22 1021   Resp 29 12/12/22 1021   SpO2 99 % 12/12/22 1021         No case tracking events are documented in the log.      Pain/Saji Score: Saji Score: 9 (12/12/2022 10:21 AM)

## 2022-12-12 NOTE — PROGRESS NOTES
Inpatient Nutrition Assessment    Admit Date: 12/4/2022   Total duration of encounter: 8 days     Nutrition Recommendation/Prescription     -Oral diet per SLP.     -PEG tube feedings once ok per GI.   Start @ 20ml/hr and advance by 10ml q4hr as tolerated to goal rate.  Isosource HN @ 60ml/hr, goal rate, will provide:   1440kcals (101% est needs)  65g protein (89% est needs)  972ml free water.  *Free water flushes recs if no IVFs: 90ml q4hr (450ml; 1422ml fl/day)    -Bolus recommendations once continuous feeds are tolerated:  250ml Isosource HN/Jevity 1.2 5x/day + 50ml water flush before and after each bolus. Will provide:  1500kcals (105% est needs), 68g protein (93% est needs), and 1510ml free water.     Communication of Recommendations: reviewed with nurse    Nutrition Assessment     Malnutrition Assessment/Nutrition-Focused Physical Exam    Malnutrition in the context of acute illness or injury  Degree of Malnutrition: severe malnutrition  Energy Intake: </= 50% of estimated energy requirement for >/= 5 days  Interpretation of Weight Loss: >7.5% in 3 months  Body Fat:moderate depletion  Area of Body Fat Loss: orbital region , upper arm region - triceps / biceps, and thoracic and lumbar region - ribs, lower back, midaxillary line  Muscle Mass Loss: moderate depletion  Area of Muscle Mass Loss: temple region - temporalis muscle, clavicle bone region - pectoralis major, deltoid, trapezius muscles, clavicle and acromion bone region - deltoid muscle, scapular bone region - trapezius, supraspinus, infraspinus muscles, and dorsal hand - interosseous musle  Fluid Accumulation: does not meet criteria  Edema: does not meet criteria   Reduced  Strength: unable to obtain  A minimum of two characteristics is recommended for diagnosis of either severe or non-severe malnutrition.    Chart Review    Reason Seen: physician consult and follow-up    Diagnosis:  Encephalopathy acute, improving  Hypernatremia  Chronic atrial  "fibrillation  Delirium  Hypertension   Moderated oropharyngeal dysphagia     Relevant Medical History: COPD, CAD, GERD, HLD, and HTN    Nutrition-Related Medications: NS @ 75ml/hr, electrolyte-A infustion @ 50ml/hr, furosemide  Calorie Containing IV Medications: no significant kcals from medications at this time    Nutrition-Related Labs:  22 Na 147, K 3, Cl 114, Gluc 146  22 Gluc 143     Diet/PN Order: Diet NPO  Oral Supplement Order: none  Tube Feeding Order:  Isosource HN @ 60ml/hr + 90ml q4hr (see below for calculation)  Appetite/Oral Intake: NPO/NPO  Factors Affecting Nutritional Intake: decreased appetite and NPO  Food/Christian/Cultural Preferences: none reported  Food Allergies: none reported    Skin Integrity: bruised (ecchymotic)  Wound(s):   none documented    Comments    22 Consult for tube feedings. NG placed today, NPO x4 days and failed MBS. Pt states decreased appetite for a while. 40-50lb wt loss x1 year ago (UBW 170lb) d/t multiple hospital admissions for COPD. Noticed significant wt loss (10%) in the last 2-3 months in EMR. Placed tube feeding ordered and discussed goal rate with RN.   22 Pt out for PEG. TF held at MN but RN reports pt has been tolerating at goal. Will resume feeds tomorrow morning, or as per GI.     Anthropometrics    Height: 5' 2" (157.5 cm) Height Method: Stated  Last Weight: 56.7 kg (125 lb) (22 2300) Weight Method: Bed Scale  BMI (Calculated): 22.9  BMI Classification: normal (BMI 18.5-24.9)     Ideal Body Weight (IBW), Female: 110 lb     % Ideal Body Weight, Female (lb): 113.64 %                    Usual Body Weight (UBW), k.27 kg  % Usual Body Weight: 73.53     Usual Weight Provided By: patient    Wt Readings from Last 5 Encounters:   22 56.7 kg (125 lb)   22 56.7 kg (125 lb)   22 59 kg (130 lb)   22 59 kg (130 lb 1.1 oz)   22 59.7 kg (131 lb 9.6 oz)     22 62.6kg   22 63kg    Weight Change(s) Since " Admission:  Admit Weight: 56.7 kg (125 lb) (12/04/22 0026)  12/12/22 no updated wt     Estimated Needs    Weight Used For Calorie Calculations: 56.7 kg (125 lb)  Energy Calorie Requirements (kcal): 1418kcals/d (25kcals/kg)  Energy Need Method: Kcal/kg  Weight Used For Protein Calculations: 56.7 kg (125 lb)  Protein Requirements: 73-85g/d (1.3-1.5g/kg)  Fluid Requirements (mL): 1418ml fl/d (1ml/kcal)  Temp: 97.6 °F (36.4 °C)       Enteral Nutrition    12/12/22- Currently on hold s/t PEG placement   Formula: Isosource HN  Rate/Volume: 60ml/hr  Water Flushes: 90ml q4hr (450ml/d)  Additives/Modulars: none at this time  Route: nasogastric tube  Method: continuous  Total Nutrition Provided by Tube Feeding, Additives, and Flushes:  Calories Provided  1440 kcal/d, 101% needs   Protein Provided  65 g/d, 89% needs   Fluid Provided  1422 ml/d, 100% needs   Continuous feeding calculations based on estimated 20 hr/d run time unless otherwise stated.    Parenteral Nutrition    Patient not receiving parenteral nutrition support at this time.    Evaluation of Received Nutrient Intake    Calories: not meeting estimated needs  Protein: not meeting estimated needs    Patient Education    Not applicable.    Nutrition Diagnosis     PES: Malnutrition related to iandequate energy intake as evidenced by <50% EER >5 days, >7.5% wt loss x3 months, moderate muscle and fat wasting. (improving)    Interventions/Goals     Intervention(s): general/healthful diet, modified composition of enteral nutrition, modified rate of enteral nutrition, and collaboration with other providers  Goal: Meet greater than 75% of nutritional needs by follow-up. (goal progressing)    Monitoring & Evaluation     Dietitian will monitor energy intake, weight change, electrolyte/renal panel, and glucose/endocrine profile.  Nutrition Risk/Follow-Up: high (follow-up in 1-4 days)   Please consult if re-assessment needed sooner.

## 2022-12-12 NOTE — PT/OT/SLP PROGRESS
Physical Therapy      Patient Name:  Judith Ramirez   MRN:  [id number]    Tx held secondary to pt just getting back from PEG procedure. Will f/u id schedule allows.

## 2022-12-12 NOTE — PROGRESS NOTES
Procedure results reported to ANTONIETTA Coelho. Informed of medications given, IVF administered, vital signs, diet/PEG orders, and patient's stability. Nurse verbalized understanding.

## 2022-12-13 VITALS
WEIGHT: 125 LBS | SYSTOLIC BLOOD PRESSURE: 99 MMHG | RESPIRATION RATE: 20 BRPM | OXYGEN SATURATION: 96 % | TEMPERATURE: 99 F | HEART RATE: 85 BPM | BODY MASS INDEX: 23 KG/M2 | DIASTOLIC BLOOD PRESSURE: 69 MMHG | HEIGHT: 62 IN

## 2022-12-13 LAB
ALBUMIN SERPL-MCNC: 2.3 GM/DL (ref 3.4–4.8)
ALBUMIN/GLOB SERPL: 0.6 RATIO (ref 1.1–2)
ALP SERPL-CCNC: 109 UNIT/L (ref 40–150)
ALT SERPL-CCNC: 22 UNIT/L (ref 0–55)
AST SERPL-CCNC: 35 UNIT/L (ref 5–34)
BASOPHILS # BLD AUTO: 0.03 X10(3)/MCL (ref 0–0.2)
BASOPHILS NFR BLD AUTO: 0.4 %
BILIRUBIN DIRECT+TOT PNL SERPL-MCNC: 0.5 MG/DL
BUN SERPL-MCNC: 13.4 MG/DL (ref 9.8–20.1)
CALCIUM SERPL-MCNC: 8.8 MG/DL (ref 8.4–10.2)
CHLORIDE SERPL-SCNC: 104 MMOL/L (ref 98–107)
CO2 SERPL-SCNC: 28 MMOL/L (ref 23–31)
CREAT SERPL-MCNC: 0.7 MG/DL (ref 0.55–1.02)
EOSINOPHIL # BLD AUTO: 0.04 X10(3)/MCL (ref 0–0.9)
EOSINOPHIL NFR BLD AUTO: 0.5 %
ERYTHROCYTE [DISTWIDTH] IN BLOOD BY AUTOMATED COUNT: 22.3 % (ref 11.5–17)
GFR SERPLBLD CREATININE-BSD FMLA CKD-EPI: >60 MLS/MIN/1.73/M2
GLOBULIN SER-MCNC: 3.8 GM/DL (ref 2.4–3.5)
GLUCOSE SERPL-MCNC: 136 MG/DL (ref 82–115)
HCT VFR BLD AUTO: 38.5 % (ref 37–47)
HGB BLD-MCNC: 11.7 GM/DL (ref 12–16)
IMM GRANULOCYTES # BLD AUTO: 0.04 X10(3)/MCL (ref 0–0.04)
IMM GRANULOCYTES NFR BLD AUTO: 0.5 %
LYMPHOCYTES # BLD AUTO: 1.44 X10(3)/MCL (ref 0.6–4.6)
LYMPHOCYTES NFR BLD AUTO: 17.3 %
MCH RBC QN AUTO: 24.5 PG (ref 27–31)
MCHC RBC AUTO-ENTMCNC: 30.4 MG/DL (ref 33–36)
MCV RBC AUTO: 80.7 FL (ref 80–94)
MONOCYTES # BLD AUTO: 0.79 X10(3)/MCL (ref 0.1–1.3)
MONOCYTES NFR BLD AUTO: 9.5 %
NEUTROPHILS # BLD AUTO: 6 X10(3)/MCL (ref 2.1–9.2)
NEUTROPHILS NFR BLD AUTO: 71.8 %
NRBC BLD AUTO-RTO: 0 %
PLATELET # BLD AUTO: 254 X10(3)/MCL (ref 130–400)
PMV BLD AUTO: 9.8 FL (ref 7.4–10.4)
POTASSIUM SERPL-SCNC: 4.2 MMOL/L (ref 3.5–5.1)
PROT SERPL-MCNC: 6.1 GM/DL (ref 5.8–7.6)
RBC # BLD AUTO: 4.77 X10(6)/MCL (ref 4.2–5.4)
SARS-COV-2 RDRP RESP QL NAA+PROBE: NEGATIVE
SODIUM SERPL-SCNC: 139 MMOL/L (ref 136–145)
WBC # SPEC AUTO: 8.3 X10(3)/MCL (ref 4.5–11.5)

## 2022-12-13 PROCEDURE — 92526 ORAL FUNCTION THERAPY: CPT

## 2022-12-13 PROCEDURE — 63600175 PHARM REV CODE 636 W HCPCS: Performed by: INTERNAL MEDICINE

## 2022-12-13 PROCEDURE — 87635 SARS-COV-2 COVID-19 AMP PRB: CPT | Performed by: STUDENT IN AN ORGANIZED HEALTH CARE EDUCATION/TRAINING PROGRAM

## 2022-12-13 PROCEDURE — 99239 PR HOSPITAL DISCHARGE DAY,>30 MIN: ICD-10-PCS | Mod: ,,, | Performed by: STUDENT IN AN ORGANIZED HEALTH CARE EDUCATION/TRAINING PROGRAM

## 2022-12-13 PROCEDURE — 25000003 PHARM REV CODE 250: Performed by: STUDENT IN AN ORGANIZED HEALTH CARE EDUCATION/TRAINING PROGRAM

## 2022-12-13 PROCEDURE — 80053 COMPREHEN METABOLIC PANEL: CPT | Performed by: STUDENT IN AN ORGANIZED HEALTH CARE EDUCATION/TRAINING PROGRAM

## 2022-12-13 PROCEDURE — 97530 THERAPEUTIC ACTIVITIES: CPT | Mod: CQ

## 2022-12-13 PROCEDURE — 99239 HOSP IP/OBS DSCHRG MGMT >30: CPT | Mod: ,,, | Performed by: STUDENT IN AN ORGANIZED HEALTH CARE EDUCATION/TRAINING PROGRAM

## 2022-12-13 PROCEDURE — 85025 COMPLETE CBC W/AUTO DIFF WBC: CPT | Performed by: STUDENT IN AN ORGANIZED HEALTH CARE EDUCATION/TRAINING PROGRAM

## 2022-12-13 PROCEDURE — 36415 COLL VENOUS BLD VENIPUNCTURE: CPT | Performed by: STUDENT IN AN ORGANIZED HEALTH CARE EDUCATION/TRAINING PROGRAM

## 2022-12-13 RX ORDER — SODIUM CHLORIDE, SODIUM GLUCONATE, SODIUM ACETATE, POTASSIUM CHLORIDE AND MAGNESIUM CHLORIDE 30; 37; 368; 526; 502 MG/100ML; MG/100ML; MG/100ML; MG/100ML; MG/100ML
50 INJECTION, SOLUTION INTRAVENOUS CONTINUOUS
Refills: 0 | Status: ON HOLD
Start: 2022-12-13 | End: 2023-01-02 | Stop reason: HOSPADM

## 2022-12-13 RX ADMIN — FUROSEMIDE 40 MG: 40 TABLET ORAL at 09:12

## 2022-12-13 RX ADMIN — DIGOXIN 0.12 MG: 125 TABLET ORAL at 09:12

## 2022-12-13 RX ADMIN — METOPROLOL TARTRATE 12.5 MG: 25 TABLET, FILM COATED ORAL at 09:12

## 2022-12-13 RX ADMIN — ENOXAPARIN SODIUM 60 MG: 80 INJECTION SUBCUTANEOUS at 06:12

## 2022-12-13 NOTE — NURSING
Patient and son educated on discharge information to TCU. Patient verbalized understanding of all discharge info. Patient being transported via mervat with 2L of oxygen in wheelchair. Report given to TCU. IV and Tele discontinued. VSS. NAD. No complaints of pain. ABD binder in place. Tube feeds held for transport. Patient and son denies any further needs.

## 2022-12-13 NOTE — PRE ADMISSION SCREENING
[facility]    Pre-Admission Patient Screening                    Pre-Screen type:  SNF    Facility Status: Accept     Referring Physician:  Jaimie    Admitting Physician:  Allie Etienne MD    Primary Care Physician:  Allie Baer MD    History         Patient Active Problem List    Diagnosis Date Noted    Foot pain 12/10/2022    Hypernatremia 12/06/2022    Encephalopathy acute 12/06/2022    Chronic atrial fibrillation 12/05/2022    Delirium 12/04/2022    Dystonia 12/04/2022    Hospital discharge follow-up 11/23/2022    Dysphagia 11/18/2022    Food bolus obstruction of intestine 11/18/2022    Diverticulosis 11/17/2022    Former smoker 11/17/2022    History of osteoporosis 11/17/2022    Gastroesophageal reflux disease 11/17/2022    Adenocarcinoma of lung 11/17/2022    Age related osteoporosis 11/17/2022    Anxiety state 11/17/2022    Cardiac conduction disorder 11/17/2022    Closed fracture of base of fifth metatarsal bone 11/17/2022    Gastroparesis 11/17/2022    Lung mass 11/17/2022    Polyp of colon 11/17/2022    Restless legs syndrome 11/17/2022    Nausea 11/17/2022    Hypertension 08/08/2022    Hypercholesterolemia 08/08/2022    Neuropathy 08/08/2022    Supraventricular tachycardia 08/08/2022    Pulmonary emphysema 08/08/2022    Primary cancer of right lower lobe of lung 08/01/2022    Centrilobular emphysema 08/01/2022    Urinary tract infection 11/02/2020         Previous Specialties/Consulted physicians:      Other: N/A      Past and Current Medical History    Past Medical History:   Diagnosis Date    COPD (chronic obstructive pulmonary disease)     Coronary artery disease     GERD (gastroesophageal reflux disease)     Hyperlipidemia     Hypertension            History of Present Illness        HPI:  Ms Wong is a 89 y/o female patient with past medical history significant for hypertension, hyperlipidemia, atrial fibrillation, COPD, restless leg syndrome who presented to  ED Friday night due to uncontrolled movements and agitation. History is obtained from son since patient is sedated at the moment of exam. Son states they first started noticing symptoms Friday afternoon. They brought patient to ED in [place] where she was given IV fluids and pramipexole was discontinued due to possible side effect of this medication. Son states patient was discharged from ED and a few hours later continued to present same symptoms therefore they brought patient to ED at [facility]. In ED patient was very agitated and presented with hallucinations. Son states the only change in medications is an increase in baclofen dose. Patient continued to be agitated since admission until this morning where she had an acute change, has been sedated, patient has received several doses of Ativan as well fentanyl x1, Benadryl and Robaxin.  LTACH Admission Criteria:    N/A: SNF Inpatient    LTACH more appropriate than other levels of care (eg, skilled nursing facility, home health care), as indicated by:    Clinical management of patient deemed too frequent and needed beyond the capabilities of alternative levels of care as evidence by: Continued Titration of IV Medications, Continued use of IV analgesics, Blood glucose monitoring greater than or equal to 4 times daily requiring clinical intervention, Active titration of oxygen , and Frequency of IV medications greater than or equal to 2 times daily  Frequent diagnostic services needed on an inpatient basis, including clinical assessments, laboratory, and imaging as evidence by: Frequent monitoring and clinical assessments performed by a licensed RN to identify current and future patient needs by incorporating the recognition of normal vs abnormal body physiology, and to prompt recognition of pertinent changes to identify and prioritize appropriate interventions that can be performed within the acute inpatient setting. , Frequent monitoring and clinical assessments  performed by a licensed RT to identify current and future patient needs by incorporating the recognition of normal vs abnormal body physiology of the Respiratory System, and to prompt recognition of pertinent changes to identify and prioritize appropriate interventions that can be performed within the acute inpatient setting. , and Frequent laboratory testing and/or imaging to aide in the improvement and effectiveness of patient's individualized treatment plan.   More intensive services, such as speciality nursing care, and/or onsite physician assessments needed that are not available at a lower level of care as evidence by: Daily physician intervention , Collaboration between consulting and attending providers still deemed a necessity to aide in the improvement and effectiveness of patient's individualized treatment plan, which can be provided at an [facility] level of care. , Continued inpatient hemodialysis (HD), Continued inpatient hyperbarics (HBO) , and Therapy Services to be included in patient's treatment plan in an effort to restore/improve patient's modality status to a safe level of functioning prior to acute illness.    Lower level of care has failed (eg, patient readmitted to acute care from a lower level of care).   Palliative and/or Hospice Care has been refused by patient/patient family.    Patient is stable for transfer to [facility], as indicated by ALL of the following:      Hypotension Absent     Cardiovascular status stable     Stable chest findings     Renal function accepctable   Pain adequately managed    Intake acceptable       No acute significant hepatic dysfunction (eg, new encephalopathy)   No acute severe unstable neurologic abnormalities (eg, Altered mental status that is severe or persistent, or evidence of ongoing CNS embolization or ischemia, worsening hydrocephalus)   No active bleeding or unstable disorders of hemostasis (eg, no recent need for transfusion, severe thrombocytopenia with  bleeding)   No need for respiratory or other isolation, OR manageable at LTACH level of care    Long-term enteral feeding (eg, PEG) and intravenous access established, not needed, OR to be placed at LTACH level of care      Anticipated Discharge Disposition:    SNF             Patient Traveled outside of the [place] in the last 3 months? no     Patient discharged from this LTAC to SNF within the last 45 days? no    Patient discharged from this LTAC to Rehab within the last 27 days? no    Prior residence: home    Prior Post-Acute Services: other: Home with fly or an assisted living facility    Allergies:   Review of patient's allergies indicates:   Allergen Reactions    Aspirin Hives    Aspirin-acetaminophen     Atropine-demerol     Clindamycin Hives    Clindamycin-jagruti per-hyalur na     Darvon compound 32 [propoxyphene-asa-caffeine]     Meperidine Hives    Penicillin Hives    Penicillins     Pentazocine lactate Hives    Propoxyphene Hives    Talwin compound     Tramadol Hives    Codeine Nausea And Vomiting and Other (See Comments)       Has patient received the current influenza vaccine (Oct 1 - March 31)? Unknown     Has patient received PPD skin test prior to admit? No    Code Status: Full Code    Orientation: Time, Place, Person, and Events    Speech: normal     Vital Signs:     Date     Blood Pressure     Pulse     Respiratory Rate     O2 Saturation     Temperature         Bowel/Bladder: continent of bladder and continent of bowel    Dialysis: N/A         Peripheral IV - Single Lumen 12/09/22 1000 22 G Anterior;Right Forearm (Active)   Site Assessment Clean;Dry;Intact;No redness;No swelling;No warmth;No drainage 12/13/22 1200   Extremity Assessment Distal to IV No abnormal discoloration;No redness;No swelling;No warmth 12/13/22 0800   Line Status Infusing 12/13/22 1200   Dressing Status Clean;Dry;Intact 12/13/22 1200   Dressing Intervention Integrity maintained 12/13/22 1200   Number of days: 4             Gastrostomy/Enterostomy 12/12/22 1500 Percutaneous endoscopic gastrostomy (PEG) midline (Active)   Securement secured to abdomen 12/13/22 0800   Feeding Type continuous 12/13/22 0800   Feeding Action feeding restarted 12/13/22 0800   Dressing dry and intact 12/13/22 0800   Flush/Irrigation flushed w/;water 12/13/22 0800   Current Rate (mL/hr) 20 mL/hr 12/13/22 0800   Goal Rate (mL/hr) 60 mL/hr 12/13/22 0800   Water Bolus (mL) 90 mL 12/13/22 0800   Residual Amount (ml) 40 ml 12/13/22 0800   Number of days: 0       CBGs/Accuchecks: No     Precautions: Fall    Restraints: No     Isolation Precautions: N/A       Facility-Administered Medications as of 12/13/2022   Medication Dose Route Frequency Provider Last Rate Last Admin    0.9%  NaCl infusion   Intravenous Continuous Laura Barry MD 75 mL/hr at 12/12/22 1516 New Bag at 12/12/22 1516    acetaminophen oral solution 650 mg  650 mg Per NG tube Q6H PRN Allie Etienne MD   650 mg at 12/10/22 1328    [COMPLETED] barium sulfate (VARIBAR NECTAR) oral suspension 30 mL  30 mL Oral ONCE PRN Allie Etienne MD   30 mL at 12/08/22 1114    [COMPLETED] barium sulfate (VARIBAR PUDDING) oral paste 30 mL  30 mL Oral ONCE PRN Allie Etienne MD   30 mL at 12/08/22 1114    [COMPLETED] barium sulfate (VARIBAR THIN LIQUID) oral powder 30 mL  30 mL Oral ONCE PRN Allie Etienne MD   30 mL at 12/08/22 1114    digoxin tablet 0.125 mg  0.125 mg Oral Daily Allie Etienne MD   0.125 mg at 12/13/22 0905    [COMPLETED] diphenhydrAMINE injection 25 mg  25 mg Intravenous ED 1 Time Breezy Wise MD   25 mg at 12/04/22 0130    electrolyte-A infusion   Intravenous Continuous Jo Ann Montenegro III, MD 50 mL/hr at 12/12/22 0911 New Bag at 12/12/22 0911    enoxaparin injection 60 mg  1 mg/kg Subcutaneous Q12H Laura Barry MD   60 mg at 12/13/22 0608    [COMPLETED] fentaNYL injection 50 mcg  50 mcg Intravenous ED 1 Time Breezy Wise MD   50 mcg at 12/04/22  0500    furosemide tablet 40 mg  40 mg Oral Daily Allie Etienne MD   40 mg at 12/13/22 0905    hydrALAZINE injection 10 mg  10 mg Intravenous Q6H PRN Allie Etienne MD        [COMPLETED] iopamidoL (ISOVUE-370) injection 100 mL  100 mL Intravenous ONCE PRN Allie Etienne MD   100 mL at 12/05/22 1402    [COMPLETED] levoFLOXacin (LEVAQUIN) 500 mg/100 mL IVPB             [COMPLETED] levoFLOXacin 750 mg/150 mL IVPB 750 mg  750 mg Intravenous Once Breezy Wise MD   Stopped at 12/04/22 0558    LIDOcaine (PF) 10 mg/ml (1%) injection 10 mg  1 mL Intradermal Once Ashok Todd MD        [COMPLETED] LORazepam (ATIVAN) 2 mg/mL injection        2 mg at 12/04/22 1500    [COMPLETED] LORazepam injection 1 mg  1 mg Intravenous ED 1 Time Breezy Wise MD   1 mg at 12/04/22 0100    [COMPLETED] LORazepam injection 1 mg  1 mg Intravenous ED 1 Time Breezy Wise MD   1 mg at 12/04/22 0130    [COMPLETED] LORazepam injection 1 mg  1 mg Intravenous ED 1 Time Breezy Wise MD   1 mg at 12/04/22 0840    melatonin tablet 6 mg  6 mg Oral Nightly PRN Breezy Wise MD        [COMPLETED] methocarbamoL injection 1,000 mg  1,000 mg Intramuscular Once Darwin Puente MD   1,000 mg at 12/04/22 1440    metoprolol tartrate (LOPRESSOR) split tablet 12.5 mg  12.5 mg Oral BID Allie Etienne MD   12.5 mg at 12/13/22 0905    ondansetron injection 4 mg  4 mg Intravenous Q8H PRN Breezy Wise MD   4 mg at 12/04/22 0454    [COMPLETED] potassium chloride 20 mEq in 100 mL IVPB (FOR CENTRAL LINE ADMINISTRATION ONLY)  20 mEq Intravenous Once Allie Etienne MD 50 mL/hr at 12/08/22 1522 20 mEq at 12/08/22 1522    [COMPLETED] potassium chloride 20 mEq in 100 mL IVPB (FOR CENTRAL LINE ADMINISTRATION ONLY)  20 mEq Intravenous Once Allie Etienne MD 50 mL/hr at 12/09/22 1158 20 mEq at 12/09/22 1158    [COMPLETED] potassium chloride 20 mEq in 100 mL IVPB (FOR CENTRAL LINE ADMINISTRATION  ONLY)  40 mEq Intravenous Once Laura Barry MD 50 mL/hr at 12/10/22 1457 20 mEq at 12/10/22 1457    [COMPLETED] propofol (DIPRIVAN) 10 mg/mL IVP injection             [COMPLETED] sodium chloride 0.9% bolus 500 mL  500 mL Intravenous ED 1 Time Breezy Wise MD   Stopped at 12/04/22 0334     Outpatient Medications as of 12/13/2022   Medication Sig Dispense Refill    acetaminophen (TYLENOL) 325 MG tablet Take 650 mg by mouth.      albuterol (PROVENTIL/VENTOLIN HFA) 90 mcg/actuation inhaler INHALE 2 PUFFS EVERY 6 HOURS 120 g 4    albuterol-ipratropium (DUO-NEB) 2.5 mg-0.5 mg/3 mL nebulizer solution Inhale 3 mLs into the lungs.      apixaban (ELIQUIS) 2.5 mg Tab Take by mouth 2 (two) times daily.      baclofen (LIORESAL) 5 mg Tab tablet       CONTOUR NEXT TEST STRIPS Strp USE 1 STRIP TO CHECK GLUCOSE ONCE DAILY      cyanocobalamin 1,000 mcg/mL injection       digoxin (LANOXIN) 125 mcg tablet       ELIQUIS 5 mg Tab Take 5 mg by mouth 2 (two) times daily.      ferrous sulfate 325 (65 FE) MG EC tablet Take 1 tablet (325 mg total) by mouth every other day. 30 tablet 3    furosemide (LASIX) 40 MG tablet Take 1 tablet (40 mg total) by mouth once daily. 90 tablet 3    melatonin (MELATIN) Take 1 mg by mouth.      metoprolol tartrate (LOPRESSOR) 25 MG tablet Take 12.5 mg by mouth.      mirtazapine (REMERON) 15 MG tablet Take 1 tablet (15 mg total) by mouth nightly. 90 tablet 3    pantoprazole (PROTONIX) 40 MG tablet Take 1 tablet (40 mg total) by mouth once daily. 30 tablet 3    polyethylene glycol (GLYCOLAX) 17 gram PwPk Take by mouth.      potassium chloride SA (K-DUR,KLOR-CON) 20 MEQ tablet Take 20 mEq by mouth once daily.      pramipexole (MIRAPEX) 0.25 MG tablet Take 0.75 mg by mouth.      pravastatin (PRAVACHOL) 40 MG tablet Take 40 mg by mouth nightly.      STIOLTO RESPIMAT 2.5-2.5 mcg/actuation Mist           Cardiovascular:    Cardiovascular Review: Within definable limits (WDL)    Telemetry: Yes    Rhythm:  "N/A    AICD: No      Respiratory:    Oxygen:  2L NC    CPT/Frequency: N/A    Incentive Spirometer/Frequency: N/A    CPAP/Settings: N/A    BiPAP/Settings: N/A    Oxygen Saturation: N/A    Suction Frequency: N/A      Vent Settings:   Mode:   Rate:   TV:   FIO2:   PEEP:   PS:   iTime:    Trial/HS Settings:   Mode:   Rate:   TV:   FIO2:   PEEP:   PS:       Nutrition:      Ht Readings from Last 3 Encounters:   12/04/22 5' 2" (1.575 m)   12/03/22 5' 2" (1.575 m)   11/23/22 5' 2" (1.575 m)     Wt Readings from Last 1 Encounters:   12/04/22 2300 56.7 kg (125 lb)   12/04/22 0026 56.7 kg (125 lb)       Feeding Status:   Current Diet: PEG tube feedings   Supplements: Isosource   Tube Feeding: N/A   Flushes: N/A  PEG tube feedings once ok per GI.   Start @ 20ml/hr and advance by 10ml q4hr as tolerated to goal rate.  Isosource HN @ 60ml/hr, goal rate, will provide:   1440kcals (101% est needs)  65g protein (89% est needs)  972ml free water.  *Free water flushes recs if no IVFs: 90ml q4hr (450ml; 1422ml fl/day)     -Bolus recommendations once continuous feeds are tolerated:  250ml Isosource HN/Jevity 1.2 5x/day + 50ml water flush before and after each bolus. Will provide:  1500kcals (105% est needs), 68g protein (93% est needs), and 1510ml free water.     Integumentary:    Integumentary: Within definable limits (WDL)              Musculoskeletal:    Transfer assist: Minimal Assistance    Weight Bearing Status: N/A    Comments: N/A    ADL Assist: Moderate Assistance    Special Equipment: walker    Radiology:    Radiology (Last 168 hours)               12/13 1121 CARDIAC MONITORING STRIPS     12/13 0736 CARDIAC MONITORING STRIPS     12/13 0435 CARDIAC MONITORING STRIPS     12/13 0435 CARDIAC MONITORING STRIPS     12/12 1522 CARDIAC MONITORING STRIPS     12/12 1131 CARDIAC MONITORING STRIPS     12/12 0708 CARDIAC MONITORING STRIPS     12/12 0345 CARDIAC MONITORING STRIPS     12/12 0345 CARDIAC MONITORING STRIPS     12/11 1775 " CARDIAC MONITORING STRIPS     12/11 1924 CARDIAC MONITORING STRIPS     12/11 1443 CARDIAC MONITORING STRIPS     12/11 1048 CARDIAC MONITORING STRIPS     12/11 0339 CARDIAC MONITORING STRIPS     12/10 2321 CARDIAC MONITORING STRIPS     12/10 1925 CARDIAC MONITORING STRIPS     12/10 1628 CARDIAC MONITORING STRIPS     12/10 1046 CARDIAC MONITORING STRIPS     12/10 0649 CARDIAC MONITORING STRIPS     12/10 0337 CARDIAC MONITORING STRIPS     12/10 0026 CARDIAC MONITORING STRIPS     12/09 2023 CARDIAC MONITORING STRIPS     12/09 1455 X-Ray Foot 2 View Right       12/09 1350 CARDIAC MONITORING STRIPS     12/09 1024 CARDIAC MONITORING STRIPS     12/09 0627 CARDIAC MONITORING STRIPS     12/09 0625 CARDIAC MONITORING STRIPS     12/09 0331 CARDIAC MONITORING STRIPS     12/08 2219 CARDIAC MONITORING STRIPS     12/08 1831 CARDIAC MONITORING STRIPS     12/08 1522 CARDIAC MONITORING STRIPS     12/08 1349 X-ray Abdomen for NG Tube Placement (Nursing should notify Radiology after placement)       12/08 1112 Fl Modified Barium Swallow Speech       12/08 0615 CARDIAC MONITORING STRIPS     12/08 0445 CARDIAC MONITORING STRIPS     12/08 0444 CARDIAC MONITORING STRIPS     12/08 0444 CARDIAC MONITORING STRIPS     12/07 1459 CARDIAC MONITORING STRIPS     12/07 1113 CARDIAC MONITORING STRIPS     12/07 0650 CARDIAC MONITORING STRIPS     12/07 0305 CARDIAC MONITORING STRIPS     12/07 0000 CARDIAC MONITORING STRIPS     12/06 1822 CARDIAC MONITORING STRIPS     12/06 1424 CARDIAC MONITORING STRIPS     12/06 1329 X-Ray Chest 1 View       12/04 0000 CARDIAC MONITORING STRIPS     12/04 0000 CARDIAC MONITORING STRIPS     12/04 0000 CARDIAC MONITORING STRIPS     12/04 0000 CARDIAC MONITORING STRIPS     12/04 0000 CARDIAC MONITORING STRIPS     12/04 0000 CARDIAC MONITORING STRIPS     12/04 0000 CARDIAC MONITORING STRIPS     12/04 0000 CARDIAC MONITORING STRIPS     12/04 0000 CARDIAC MONITORING STRIPS     12/04 0000 CARDIAC MONITORING STRIPS               X-Ray Foot 2 View Right  Narrative: EXAMINATION:  XR FOOT 2 VIEW RIGHT    CLINICAL HISTORY:  pain;    COMPARISON:  None    FINDINGS:  AP and lateral views of the right foot were obtained.  The visualized osseous structures appear demineralized.  No definite evidence of acute displaced fracture or active dislocation is visualized.  There appears to be deformity of the 2nd metatarsal shaft which may be related to remote fracture.  Mild-to-moderate hallux valgus is seen.  Scattered interphalangeal joint space narrowing is seen.  Mild midfoot arthritic changes are seen including small dorsal marginal osteophytes.  Plantar and dorsal calcaneal spurs are noted.  No radiopaque foreign bodies are seen.  Soft tissue swelling about the foot is noted.  Impression: No definite evidence of acute displaced fracture or active dislocation is visualized.  If the patient's symptoms are persistent or otherwise clinically indicated, a repeat study in 7 to 10 days may be obtained when a radiographically occult fracture may be more conspicuous.    Electronically signed by: Tam Farnsworth MD  Date:    12/09/2022  Time:    16:49      Lab/Cultures:    Blood Urea Nitrogen   Date Value Ref Range Status   12/13/2022 13.4 9.8 - 20.1 mg/dL Final   12/11/2022 16.6 9.8 - 20.1 mg/dL Final     Creatinine   Date Value Ref Range Status   12/13/2022 0.70 0.55 - 1.02 mg/dL Final   12/11/2022 0.61 0.55 - 1.02 mg/dL Final     WBC   Date Value Ref Range Status   12/13/2022 8.3 4.5 - 11.5 x10(3)/mcL Final   12/06/2022 10.3 4.5 - 11.5 x10(3)/mcL Final      No results found for: CULTBLD, LABBLOO, CULBFAERO, CULBFANAERO, CULTGAS, CULTMRSA, CULTSTOOL, THROATCULTA, CULTURESP, LABURIN, URINESENSE, CULWND, TCULT, RESPIRATORYC, CDIFFICILEAN, CDIFFTOX  No results for input(s): PH, PCO2, PO2, HCO3, POCSATURATED, BE in the last 72 hours.

## 2022-12-13 NOTE — PLAN OF CARE
Pt accepted to TCU today. I informed pt and her dil Gloria and her son Abdulaziz who arrived to hospital. Pt set up with [facility] at 3:30 for transportation. Kimber with TCU stated they can accept pt with TF not at goal. Susan MANE stated pt has been tolerating TF thus far and would be at goal around 5. Pt is on 2 l oxygen by facemask. Pt is able to sit in w/c for travel.  No IMM noted in Epic.  Accepting at TCU is Dr López Weaver. DC packet given to Susan MANE.

## 2022-12-13 NOTE — PROGRESS NOTES
[facility] - [facility]rd [facility] Medicine  Progress Note    Patient Name: Judith Ramirez  MRN: [id number]  Patient Class: IP- Inpatient   Admission Date: 12/4/2022  Length of Stay: 8 days  Attending Physician: Allie Etienne MD  Primary Care Provider: Allie Baer MD        Subjective:     Principal Problem:Dystonia        HPI:  Ms Wong is a 87 y/o female patient with past medical history significant for hypertension, hyperlipidemia, atrial fibrillation, COPD, restless leg syndrome who presented to ED Friday night due to uncontrolled movements and agitation. History is obtained from son since patient is sedated at the moment of exam. Son states they first started noticing symptoms Friday afternoon. They brought patient to ED in [place] where she was given IV fluids and pramipexole was discontinued due to possible side effect of this medication. Son states patient was discharged from ED and a few hours later continued to present same symptoms therefore they brought patient to ED at [facility]. In ED patient was very agitated and presented with hallucinations. Son states the only change in medications is an increase in baclofen dose. Patient continued to be agitated since admission until this morning where she had an acute change, has been sedated, patient has received several doses of Ativan as well fentanyl x1, Benadryl and Robaxin.       Overview/Hospital Course:  No notes on file    Interval History: No new complaints today, states foot pain has improved over the weekend, had PEG done today, tolerated procedure well.     Review of Systems   Constitutional:  Negative for chills, fatigue and fever.   HENT:  Negative for congestion, rhinorrhea and sore throat.    Respiratory:  Negative for cough, chest tightness and shortness of breath.    Cardiovascular:  Negative for chest pain, palpitations and leg swelling.   Gastrointestinal:  Negative for abdominal distention,  abdominal pain, constipation, diarrhea, nausea and vomiting.   Genitourinary:  Negative for dysuria.   Musculoskeletal:  Negative for arthralgias, back pain and joint swelling.   Neurological:  Negative for dizziness and headaches.   Psychiatric/Behavioral:  Negative for agitation and confusion.    Objective:     Vital Signs (Most Recent):  Temp: 98.3 °F (36.8 °C) (12/12/22 1541)  Pulse: 87 (12/12/22 1541)  Resp: 18 (12/12/22 1541)  BP: (!) 145/76 (12/12/22 1541)  SpO2: 97 % (12/12/22 1541)   Vital Signs (24h Range):  Temp:  [96.8 °F (36 °C)-98.9 °F (37.2 °C)] 98.3 °F (36.8 °C)  Pulse:  [76-96] 87  Resp:  [18-32] 18  SpO2:  [94 %-99 %] 97 %  BP: ()/(58-85) 145/76     Weight: 56.7 kg (125 lb)  Body mass index is 22.86 kg/m².    Intake/Output Summary (Last 24 hours) at 12/12/2022 1800  Last data filed at 12/12/2022 1423  Gross per 24 hour   Intake 452 ml   Output 1950 ml   Net -1498 ml      Physical Exam  Constitutional:       General: She is not in acute distress.     Appearance: Normal appearance.   HENT:      Head: Normocephalic and atraumatic.   Eyes:      Extraocular Movements: Extraocular movements intact.      Pupils: Pupils are equal, round, and reactive to light.   Cardiovascular:      Rate and Rhythm: Normal rate and regular rhythm.      Pulses: Normal pulses.      Heart sounds: Normal heart sounds. No murmur heard.    No friction rub. No gallop.   Pulmonary:      Effort: Pulmonary effort is normal.      Breath sounds: Normal breath sounds. No wheezing, rhonchi or rales.   Abdominal:      General: Abdomen is flat. Bowel sounds are normal. There is no distension.      Palpations: Abdomen is soft.      Tenderness: There is no abdominal tenderness.   Musculoskeletal:         General: No swelling or tenderness. Normal range of motion.      Cervical back: Normal range of motion and neck supple. No tenderness.      Right lower leg: No edema.      Left lower leg: No edema.   Lymphadenopathy:      Cervical: No  cervical adenopathy.   Skin:     Findings: No lesion or rash.   Neurological:      General: No focal deficit present.      Mental Status: She is alert and oriented to person, place, and time.      Cranial Nerves: No cranial nerve deficit.      Sensory: No sensory deficit.      Motor: No weakness.   Psychiatric:         Mood and Affect: Mood normal.         Behavior: Behavior normal.         Thought Content: Thought content normal.       Significant Labs: All pertinent labs within the past 24 hours have been reviewed.    Significant Imaging: I have reviewed all pertinent imaging results/findings within the past 24 hours.      Assessment/Plan:      * Dystonia  Resolved  Neurology signed off  Possible side effect to medication, holding all sedatives  CT and  CTA head negative   EEG unremarkable  PT consulted       Foot pain  Xray doesn't show a fracture.  Improving       Encephalopathy acute  Resolved        Hypernatremia  Resolved       Chronic atrial fibrillation  Rate controlled  Restarted home digoxin and metoprolol per NG tube   Lovenox 1 mg/kg started for anticoagulation     Delirium  Secondary to polypharmacy  Resolved  Continue to hold all sedatives       Dysphagia  Peg done today  Will resume tube feeds       Pulmonary emphysema  She is on chronic home oxygen.      Hypertension  Controlled with home meds   Restarted home meds, NG tube placed   Hydralazine PRN         VTE Risk Mitigation (From admission, onward)         Ordered     enoxaparin injection 60 mg  Every 12 hours (non-standard times)         12/06/22 1334     Place TATIANA hose  Until discontinued         12/04/22 0358     IP VTE LOW RISK PATIENT  Once         12/04/22 0358                Discharge Planning   DEYANIRA:      Code Status: Full Code   Is the patient medically ready for discharge?:     Reason for patient still in hospital (select all that apply): Treatment  Discharge Plan A: Skilled Nursing Facility                  Allie Baer,  MD  Department of Hospital Medicine   [facility] - 3rd Floor Medical Telemetry

## 2022-12-13 NOTE — PT/OT/SLP PROGRESS
"Physical Therapy Treatment    Patient Name:  Judith Ramirez   MRN:  [id number]    Recommendations:     Discharge Recommendations: nursing facility, skilled  Discharge Equipment Recommendations: none  Barriers to discharge:  placement    Assessment:     Judith Ramirez is a 88 y.o. female admitted with a medical diagnosis of Dystonia.  She presents with the following impairments/functional limitations: weakness, impaired endurance, gait instability, impaired balance, impaired functional mobility .    Rehab Prognosis: Good; patient would benefit from acute skilled PT services to address these deficits and reach maximum level of function.    Recent Surgery: Procedure(s) (LRB):  EGD, WITH PEG TUBE INSERTION (N/A) 1 Day Post-Op    Plan:     During this hospitalization, patient to be seen 6 x/week to address the identified rehab impairments via gait training, therapeutic activities, therapeutic exercises and progress toward the following goals:    Plan of Care Expires:       Subjective     Chief Complaint: dizziness  Patient/Family Comments/goals:   Pain/Comfort:         Objective:     Communicated with RN prior to session.  Patient found HOB elevated with   upon PT entry to room.     General Precautions: Standard, aspiration  Orthopedic Precautions:    Braces:    Respiratory Status: Nasal cannula, flow 2 L/min     Functional Mobility:  Bed Mobility:     Supine to Sit: stand by assistance  Transfers:     Sit to Stand:  minimum assistance with rolling walker  Gait: Pt amb 4 ft from EOB>Recliner with RW Fabio. Pt with c/o "heavy legs".      AM-PAC 6 CLICK MOBILITY          Treatment & Education:  Pt sat EOB roughly 10" before amb to recliner chair secondary to dizziness and fatigue. Vitals stable.    Patient left up in chair with all lines intact, call button in reach, and RN notified..    GOALS:   Multidisciplinary Problems       Physical Therapy Goals          Problem: Physical Therapy    Goal Priority Disciplines Outcome Goal " Variances Interventions   Physical Therapy Goal     PT, PT/OT Ongoing, Progressing     Description: Goals to be met by: 2023     Patient will increase functional independence with mobility by performin. Supine to sit with [place]  2. Sit to stand transfer with Child[place]  3. Bed to chair transfer with Modified Childress using Rolling Walker  4. Gait  x 400 feet with Modified Childress using Rolling Walker.                          Time Tracking:     PT Received On: 22  PT Start Time: 1122     PT Stop Time: 1145  PT Total Time (min): 23 min     Billable Minutes: Therapeutic Activity 23    Treatment Type: Treatment  PT/PTA: PTA     PTA Visit Number: 2     2022

## 2022-12-13 NOTE — PT/OT/SLP PROGRESS
Speech Language Pathology Department  Dysphagia Therapy Progress Note    Patient Name:  Judith Ramirze   MRN:  [id number]  Admitting Diagnosis: Dystonia    Recommendations:     General recommendations:  dysphagia therapy  Diet recommendations:  NPO, Liquid Diet Level: NPO   Discharge recommendations:    snf, tcu  Barriers to safe discharge:  acuity of illness    Subjective     Patient awake and alert.    Pain/Comfort: Pain Rating 1: 0/10    Spiritual/Cultural/Sabianist Beliefs/Practices that affect care: no    Respiratory Status: oxymask    Objective:     Oral Musculature Evaluation:  Oral Musculature: general weakness  Dentition: edentulous  Mucosal Quality: good  Voice Prior to PO Intake: hoarse    Therapeutic Activities:  Pt completed base of tongue and laryngeal x50 with minimal cues.  Pt tolerated thermal stimulation to the anterior faucial pillars x10 with 100 swallow responses.  Laryngeal excursion reduced.      Assessment:     Pt presents with dysphagia and is unsafe for PO intake.     Goals:   Multidisciplinary Problems       SLP Goals          Problem: SLP    Goal Priority Disciplines Outcome   SLP Goal     SLP Ongoing, Progressing   Description:   LTG: Tolerate least restrictive PO diet with no clinical signs/sx aspiration  STGs:  1.  Tolerate 2oz of mildly thick liquid by spoon with no clinical signs/sx aspiration  2.  Complete base of tongue and laryngeal strengthening exercises with min cues  3.  Tolerate thermal stimulation to the anterior faucial pillars with 100% effortful swallow responses and delay less than 2 seconds.                       Patient Education:     Patient provided with verbal education regarding POC.  Understanding was verbalized.    Plan:     Will continue to follow and tx as appropriate.    Patient to be seen:  daily   Plan of Care expires:  12/15/22  Plan of Care reviewed with:  patient, son   SLP Follow-Up:  Yes       Time Tracking:     SLP Treatment Date:    12/13/22  Speech Start  Time:   1000  Speech Stop Time:     1010   Speech Total Time (min):   10    Billable minutes:  Treatment of Swallow Dysfunction, 10        12/13/2022

## 2022-12-13 NOTE — SUBJECTIVE & OBJECTIVE
Interval History: No new complaints today, states foot pain has improved over the weekend, had PEG done today, tolerated procedure well.     Review of Systems   Constitutional:  Negative for chills, fatigue and fever.   HENT:  Negative for congestion, rhinorrhea and sore throat.    Respiratory:  Negative for cough, chest tightness and shortness of breath.    Cardiovascular:  Negative for chest pain, palpitations and leg swelling.   Gastrointestinal:  Negative for abdominal distention, abdominal pain, constipation, diarrhea, nausea and vomiting.   Genitourinary:  Negative for dysuria.   Musculoskeletal:  Negative for arthralgias, back pain and joint swelling.   Neurological:  Negative for dizziness and headaches.   Psychiatric/Behavioral:  Negative for agitation and confusion.    Objective:     Vital Signs (Most Recent):  Temp: 98.3 °F (36.8 °C) (12/12/22 1541)  Pulse: 87 (12/12/22 1541)  Resp: 18 (12/12/22 1541)  BP: (!) 145/76 (12/12/22 1541)  SpO2: 97 % (12/12/22 1541)   Vital Signs (24h Range):  Temp:  [96.8 °F (36 °C)-98.9 °F (37.2 °C)] 98.3 °F (36.8 °C)  Pulse:  [76-96] 87  Resp:  [18-32] 18  SpO2:  [94 %-99 %] 97 %  BP: ()/(58-85) 145/76     Weight: 56.7 kg (125 lb)  Body mass index is 22.86 kg/m².    Intake/Output Summary (Last 24 hours) at 12/12/2022 1800  Last data filed at 12/12/2022 1423  Gross per 24 hour   Intake 452 ml   Output 1950 ml   Net -1498 ml      Physical Exam  Constitutional:       General: She is not in acute distress.     Appearance: Normal appearance.   HENT:      Head: Normocephalic and atraumatic.   Eyes:      Extraocular Movements: Extraocular movements intact.      Pupils: Pupils are equal, round, and reactive to light.   Cardiovascular:      Rate and Rhythm: Normal rate and regular rhythm.      Pulses: Normal pulses.      Heart sounds: Normal heart sounds. No murmur heard.    No friction rub. No gallop.   Pulmonary:      Effort: Pulmonary effort is normal.      Breath sounds:  Normal breath sounds. No wheezing, rhonchi or rales.   Abdominal:      General: Abdomen is flat. Bowel sounds are normal. There is no distension.      Palpations: Abdomen is soft.      Tenderness: There is no abdominal tenderness.   Musculoskeletal:         General: No swelling or tenderness. Normal range of motion.      Cervical back: Normal range of motion and neck supple. No tenderness.      Right lower leg: No edema.      Left lower leg: No edema.   Lymphadenopathy:      Cervical: No cervical adenopathy.   Skin:     Findings: No lesion or rash.   Neurological:      General: No focal deficit present.      Mental Status: She is alert and oriented to person, place, and time.      Cranial Nerves: No cranial nerve deficit.      Sensory: No sensory deficit.      Motor: No weakness.   Psychiatric:         Mood and Affect: Mood normal.         Behavior: Behavior normal.         Thought Content: Thought content normal.       Significant Labs: All pertinent labs within the past 24 hours have been reviewed.    Significant Imaging: I have reviewed all pertinent imaging results/findings within the past 24 hours.

## 2022-12-13 NOTE — PHYSICIAN QUERY
PT Name: Judith Ramirez  MR #: [id number]    DOCUMENTATION CLARIFICATION     CDS/: Dawna Rodriguez   RN CCDS            Contact information: [email or web address]    This form is a permanent document in the medical record.     Query Date: December 13, 2022    By submitting this query, we are merely seeking further clarification of documentation.. Please utilize your independent clinical judgment when addressing the question(s) below.    The medical record contains the following:   Indicators  Supporting Clinical Findings Location in Medical Record   x Energy Intake Energy Intake: </= 50% of estimated energy requirement for >/= 5 days   Registered Dietician PN 12/12   x Weight Loss Interpretation of Weight Loss: >7.5% in 3 months   Registered Dietician PN 12/12     x Fat Loss Body Fat:moderate depletion  Area of Body Fat Loss: orbital region , upper arm region - triceps / biceps, and thoracic and lumbar region - ribs, lower back, midaxillary line   Registered Dietician PN 12/12   x Muscle Loss Muscle Mass Loss: moderate depletion  Area of Muscle Mass Loss: temple region - temporalis muscle, clavicle bone region - pectoralis major, deltoid, trapezius muscles, clavicle and acromion bone region - deltoid muscle, scapular bone region - trapezius, supraspinus, infraspinus muscles, and  dorsal hand - interosseous musle   Registered Dietician PN 12/12    Edema/Fluid Accumulation      Reduced  Strength (by dynamometer)     x Weight, BMI, Usual Body Weight BMI (Calculated): 22.9  BMI Classification: normal (BMI 18.5-24.9)    12/8/22 Consult for tube feedings. NG placed today, NPO x4 days and failed MBS. Pt states decreased appetite for a while. 40-50lb wt loss x1 year ago (UBW 170lb) d/t multiple hospital admissions for COPD. Noticed significant wt loss (10%) in the last  2-3 months in EMR. Placed tube feeding ordered and discussed goal rate with RN.   Registered Dietician PN 12/12    Delayed Wound Healing     x  Registered Dietician Diagnosis Malnutrition in the context of acute illness or injury  Degree of Malnutrition: severe malnutrition   Registered Dietician PN 12/12   x Acute or Chronic Illness Encephalopathy acute, improving  Hypernatremia  Chronic atrial fibrillation  Delirium  Hypertension  Moderated oropharyngeal dysphagia   COPD, CAD, GERD, HLD, and HTN   Registered Dietician PN 12/12    Social or Environmental Circumstances     x Treatment Intervention(s): general/healthful diet, modified composition of enteral nutrition, modified rate of enteral nutrition, and collaboration with other providers  Goal: Meet greater than 75% of nutritional needs by follow-up. (goal progressing)   Registered Dietician PN 12/12   x Other PES: Malnutrition related to iandequate energy intake as evidenced by <50% EER >5 days, >7.5% wt loss x3 months, moderate muscle and fat wasting.   Registered Dietician PN 12/12     Academy of Nutrition and Dietetics (Academy) and the American Society for Parenteral and Enteral Nutrition (A.S.P.E.N.) Clinical Characteristics to support Malnutrition   Malnutrition in the Context of Acute Illness or Injury Malnutrition in the Context of Chronic Illness or Injury Malnutrition in the Context of Social or Environmental Circumstances   Malnutrition Level Moderate Severe Moderate Severe   Moderate   Severe   Energy Intake <75%                   >7 days <50%                 >5 days <75%           >1 month <75%                      >1 month   <75% for >3 months   <50% for >1 month   Weight Loss   1-2% in 1 week >2% in 1 week 5% in 1 month >5% in 1 month 5% in 1 month >5% in 1 month    5% in 1 month >5% in 1 month 7.5% in 3 months >7.5% in 3 months 7.5% in 3 months >7.5% in 3 months    7.5% in 3 months >7.5% in 3 months 10% in 6 months >10% in 6 months 10% in 6 months >10% in 6 months        20% in 1 year                    >20% in 1 year                                                                  20%  in 1 year                            >20% in 1 year                                                  Subcutaneous Fat Loss Mild  Moderate  Mild  Severe    Mild   Severe   Muscle Loss Mild  Moderate  Mild  Severe    Mild   Severe   Edema/Fluid Accumulation Mild Moderate to severe  Mild  Severe   Mild   Severe   Reduced  Strength         (based on standards supplied by  of dynamometer) N/A Measurably reduced N/A Measurably reduced N/A Measurably reduced     Criteria for mild malnutrition is defined as 1 characteristic outlined above within the established moderate or severe parameters.  A minimum of 2 out of the 6 characteristics noted above are recommended for a diagnosis of moderate or severe malnutrition.  Chronic illness/injury is a disease/condition lasting 3 months or longer.    The noted clinical guidelines are only system guidelines and do not replace the providers clinical judgment.    Provider, please specify diagnosis or diagnoses associated with above clinical findings.    [   ] Severe Malnutrition - a minimum of 2 of the 6 severe malnutrition characteristics noted above    [   ] Other Nutritional Diagnosis (please specify): _______   [ x ] Clinically Undetermined         Please document in your progress notes daily for the duration of treatment until resolved and  include in your discharge summary.      References:    MARA Hogue, & OTM Swift. (2022, April). Assessment and management of anorexia and cachexia in palliative care. Retrieved May 23, 2022, from https://[email or web address]/contents/assessment-and-management-of-anorexia-and-cachexia-in-palliative-care?[email or web address]&source=see_link     CAROLINA Cid, PhD, RD, Nasir GLOVER P., PhD, RN, GAL Espinal MD, PhD, Ivis CROOK A., MS, RD, [facility], DAWN Vaughn, MS, RD, The Academy Malnutrition Work Group, The A.S.P.E.N. Board of Directors. (2012). Consensus Statement: Academy of Nutrition and Dietetics and American Society for Parenteral and  Enteral Nutrition: Characteristics Recommended for the Identification and Documentation of Adult Malnutrition (Undernutrition). Journal of Parenteral and Enteral Nutrition, 36(3), 275-283. doi:10.1177/5241920336143523     Form No. 79843

## 2022-12-13 NOTE — PROGRESS NOTES
"Gastroenterology Progress Note    Subjective/Interval History:  Tolerating continuous PEG Tfs at 40 cc/hr.  No abdominal pain, n/v.  No issues with meds per PEG.     Vital Signs:  /67   Pulse 79   Temp 98.4 °F (36.9 °C) (Oral)   Resp 20   Ht 5' 2" (1.575 m)   Wt 56.7 kg (125 lb)   SpO2 100%   BMI 22.86 kg/m²   Body mass index is 22.86 kg/m².    Physical Exam:  Constitutional:       General: She is not in acute distress.     Appearance: She is ill-appearing (chronically).   HENT:      Head: Normocephalic and atraumatic.      Mouth/Throat:      Mouth: Mucous membranes are dry.   Eyes:      General: No scleral icterus.     Extraocular Movements: Extraocular movements intact.   Cardiovascular:      Rate and Rhythm: Normal rate and regular rhythm.   Pulmonary:      Effort: Pulmonary effort is normal. No respiratory distress.      Comments: 2L O2 in place  Abdominal:      General: Bowel sounds are normal. There is no distension.      Palpations: Abdomen is soft. There is no mass.      Tenderness: There is no abdominal tenderness. There is no guarding or rebound.      Comments: PEG in place in epigastrium c/d/I with continuous Tfs going at 40 cc/hr. External bumper at 4.5cm.  Abdominal binder in place.   Musculoskeletal:         General: Normal range of motion.      Right lower leg: No edema.      Left lower leg: No edema.   Skin:     General: Skin is warm and dry.   Neurological:      Mental Status: She is alert and oriented to person, place, and time.   Psychiatric:         Mood and Affect: Mood and affect normal.     Labs:  Recent Results (from the past 48 hour(s))   Basic Metabolic Panel    Collection Time: 12/08/22  3:49 AM   Result Value Ref Range    Sodium Level 147 (H) 136 - 145 mmol/L    Potassium Level 3.0 (L) 3.5 - 5.1 mmol/L    Chloride 114 (H) 98 - 107 mmol/L    Carbon Dioxide 24 23 - 31 mmol/L    Glucose Level 146 (H) 82 - 115 mg/dL    Blood Urea Nitrogen 9.6 (L) 9.8 - 20.1 mg/dL    Creatinine " 0.67 0.55 - 1.02 mg/dL    BUN/Creatinine Ratio 14     Calcium Level Total 8.6 8.4 - 10.2 mg/dL    Anion Gap 9.0 mEq/L    eGFR >60 mls/min/1.73/m2   Basic Metabolic Panel    Collection Time: 12/09/22  4:14 AM   Result Value Ref Range    Sodium Level 144 136 - 145 mmol/L    Potassium Level 3.1 (L) 3.5 - 5.1 mmol/L    Chloride 109 (H) 98 - 107 mmol/L    Carbon Dioxide 26 23 - 31 mmol/L    Glucose Level 139 (H) 82 - 115 mg/dL    Blood Urea Nitrogen 10.4 9.8 - 20.1 mg/dL    Creatinine 0.69 0.55 - 1.02 mg/dL    BUN/Creatinine Ratio 15     Calcium Level Total 8.1 (L) 8.4 - 10.2 mg/dL    Anion Gap 9.0 mEq/L    eGFR >60 mls/min/1.73/m2         Assessment/Plan:  89 yo CF known to Dr. 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  Patient is admitted with delirium/encephalopathy and dystonia felt secondary to possible medication side effect. GI service consulted for dysphagia.      Dysphagia, esophageal and oropharyngeal  Known ineffective esophageal motility.    MBS 12/8/22: moderate oropharyngeal dysphagia and is at HIGH risk for aspiration and associated complications.     s/p EGD/PEG 12/13/22  - Continue TFs and advance as tolerated to goal as per set orders.   - PEG site care.   - Keep abdominal binder in place at all times to prevent dislodgement.    PEG looks good and is without malfunction.  Please call GI if needed.       The documentation recorded by the scribe/NP accurately reflects the service I personally performed and the decisions made by me.       Jo Ann Montenegro Iii, MD  [facility]

## 2022-12-14 NOTE — HOSPITAL COURSE
Patient continued to be agitated at first however on day 3 of hospitalization she became difficult to arouse, rapid response was called and patient had a repeat CT head and EEG which were unremarkable. All sedatives were held and patient's mental status continued to improve in the next 48 hours until she was back at baseline. Continued to have dysphagia throughout her hospitalization, failed MBS, GI was consulted for PEG tube placement, this was discussed with patient and family and they agreed. PEG was placed on 12/12, patient is tolerating tube feeds well. Has been able to work with PT, recommended skilled nursing facility since patient is not independent. Patient and family agreed and will be going to TCU today.

## 2022-12-14 NOTE — DISCHARGE SUMMARY
[facility] - 3rd [facility] Medicine  Discharge Summary      Patient Name: Judith Ramirez  MRN: [id number]  JOSELINE: [id number]  Patient Class: IP- Inpatient  Admission Date: 12/4/2022  Hospital Length of Stay: 9 days  Discharge Date and Time:  12/13/2022 6:56 PM  Attending Physician: Puja att. providers found   Discharging Provider: Allie Baer MD  Primary Care Provider: Allie Baer MD    Primary Care Team: Networked reference to record PCT     HPI:   Ms Wong is a 87 y/o female patient with past medical history significant for hypertension, hyperlipidemia, atrial fibrillation, COPD, restless leg syndrome who presented to ED Friday night due to uncontrolled movements and agitation. History is obtained from son since patient is sedated at the moment of exam. Son states they first started noticing symptoms Friday afternoon. They brought patient to ED in [place] where she was given IV fluids and pramipexole was discontinued due to possible side effect of this medication. Son states patient was discharged from ED and a few hours later continued to present same symptoms therefore they brought patient to ED at [facility]. In ED patient was very agitated and presented with hallucinations. Son states the only change in medications is an increase in baclofen dose.       Procedure(s) (LRB):  EGD, WITH PEG TUBE INSERTION (N/A)      Hospital Course:   Patient continued to be agitated at first however on day 3 of hospitalization she became difficult to arouse, rapid response was called and patient had a repeat CT head and EEG which were unremarkable. All sedatives were held and patient's mental status continued to improve in the next 48 hours until she was back at baseline. Continued to have dysphagia throughout her hospitalization, failed MBS, GI was consulted for PEG tube placement, this was discussed with patient and family and they agreed. PEG was placed on 12/12, patient is  tolerating tube feeds well. Has been able to work with PT, recommended skilled nursing facility since patient is not independent. Patient and family agreed and will be going to TCU today.        Goals of Care Treatment Preferences:  Code Status: Full Code      Consults:   Consults (From admission, onward)        Status Ordering Provider     Inpatient consult to Registered Dietitian/Nutritionist  Once        Provider:  (Not yet assigned)    DINA Babcock     Inpatient consult to Neurology  Once        Provider:  Augusta Walls MD    Completed AIDE GUY        Vitals:    12/13/22 1200   BP:    Pulse: 85   Resp: 20   Temp:      Physical Exam  Constitutional:       General: She is not in acute distress.     Appearance: Normal appearance.   HENT:      Head: Normocephalic and atraumatic.   Eyes:      Extraocular Movements: Extraocular movements intact.      Pupils: Pupils are equal, round, and reactive to light.   Cardiovascular:      Rate and Rhythm: Normal rate and regular rhythm.      Pulses: Normal pulses.      Heart sounds: Normal heart sounds. No murmur heard.    No friction rub. No gallop.   Pulmonary:      Effort: Pulmonary effort is normal.      Breath sounds: Normal breath sounds. No wheezing, rhonchi or rales.   Abdominal:      General: Abdomen is flat. Bowel sounds are normal. There is no distension.      Palpations: Abdomen is soft.      Tenderness: There is no abdominal tenderness.   Musculoskeletal:         General: No swelling or tenderness. Normal range of motion.      Cervical back: Normal range of motion and neck supple. No tenderness.      Right lower leg: No edema.      Left lower leg: No edema.   Lymphadenopathy:      Cervical: No cervical adenopathy.   Skin:     Findings: No lesion or rash.   Neurological:      General: No focal deficit present.      Mental Status: She is alert and oriented to person, place, and time.      Cranial Nerves: No cranial nerve deficit.       Sensory: No sensory deficit.      Motor: No weakness.   Psychiatric:         Mood and Affect: Mood normal.         Behavior: Behavior normal.         Thought Content: Thought content normal.         * Dystonia  Resolved  Possible side effect to medication, holding all sedatives  CT and  CTA head negative   EEG unremarkable  Continue PT      Chronic atrial fibrillation  Rate controlled  Continue home medications     Delirium  Secondary to polypharmacy  Resolved  Continue to hold all sedatives       Dysphagia  Continue tube feeds through PEG  Needs to reach goal of 60 ml/h       Pulmonary emphysema  She is on chronic home oxygen  Continue home meds       Hypertension  Continue home meds         Final Active Diagnoses:    Diagnosis Date Noted POA    PRINCIPAL PROBLEM:  Dystonia [G24.9] 12/04/2022 Yes    Foot pain [M79.673] 12/10/2022 Unknown    Hypernatremia [E87.0] 12/06/2022 Unknown    Encephalopathy acute [G93.40] 12/06/2022 Yes    Chronic atrial fibrillation [I48.20] 12/05/2022 Yes    Delirium [R41.0] 12/04/2022 Yes    Dysphagia [R13.10] 11/18/2022 Yes    Hypertension [I10] 08/08/2022 Yes    Pulmonary emphysema [J43.9] 08/08/2022 Yes      Problems Resolved During this Admission:       Discharged Condition: stable    Disposition: Skilled Nursing Facility    Follow Up:   Follow-up Information     Allie Baer MD Follow up.    Specialty: Internal Medicine  Contact information:  [address] [place] [id number] [phone]                       Patient Instructions:   No discharge procedures on file.    Significant Diagnostic Studies: Labs:   BMP:   Recent Labs   Lab 12/13/22  1042      K 4.2   CO2 28   BUN 13.4   CREATININE 0.70   CALCIUM 8.8    and CMP   Recent Labs   Lab 12/13/22  1042      K 4.2   CO2 28   BUN 13.4   CREATININE 0.70   CALCIUM 8.8   ALBUMIN 2.3*   BILITOT 0.5   ALKPHOS 109   AST 35*   ALT 22       Pending Diagnostic Studies:     None          Medications:  Reconciled Home Medications:      Medication List      START taking these medications    electrolyte-A Solp infusion  Commonly known as: PLASMALYTE  Inject 50 mL/hr into the vein continuous.        CHANGE how you take these medications    apixaban 2.5 mg Tab  Commonly known as: ELIQUIS  Take by mouth 2 (two) times daily.  What changed: Another medication with the same name was removed. Continue taking this medication, and follow the directions you see here.        CONTINUE taking these medications    albuterol 90 mcg/actuation inhaler  Commonly known as: PROVENTIL/VENTOLIN HFA  INHALE 2 PUFFS EVERY 6 HOURS     albuterol-ipratropium 2.5 mg-0.5 mg/3 mL nebulizer solution  Commonly known as: DUO-NEB  Inhale 3 mLs into the lungs.     cyanocobalamin 1,000 mcg/mL injection     digoxin 125 mcg tablet  Commonly known as: LANOXIN     ferrous sulfate 325 (65 FE) MG EC tablet  Take 1 tablet (325 mg total) by mouth every other day.     furosemide 40 MG tablet  Commonly known as: LASIX  Take 1 tablet (40 mg total) by mouth once daily.     melatonin  Commonly known as: MELATIN  Take 1 mg by mouth.     metoprolol tartrate 25 MG tablet  Commonly known as: LOPRESSOR  Take 12.5 mg by mouth.     pantoprazole 40 MG tablet  Commonly known as: PROTONIX  Take 1 tablet (40 mg total) by mouth once daily.     pravastatin 40 MG tablet  Commonly known as: PRAVACHOL  Take 40 mg by mouth nightly.     STIOLTO RESPIMAT 2.5-2.5 mcg/actuation Mist  Generic drug: tiotropium-olodateroL     TylenoL 325 MG tablet  Generic drug: acetaminophen  Take 650 mg by mouth.        STOP taking these medications    baclofen 5 mg Tab tablet  Commonly known as: LIORESAL     CONTOUR NEXT TEST STRIPS Strp  Generic drug: blood sugar diagnostic     mirtazapine 15 MG tablet  Commonly known as: REMERON     polyethylene glycol 17 gram Pwpk  Commonly known as: GLYCOLAX     potassium chloride SA 20 MEQ tablet  Commonly known as: K-DUR,KLOR-CON     pramipexole  0.25 MG tablet  Commonly known as: MIRAPEX            Indwelling Lines/Drains at time of discharge:   Lines/Drains/Airways     Drain  Duration                Gastrostomy/Enterostomy 12/12/22 1500 Percutaneous endoscopic gastrostomy (PEG) midline 1 day                Time spent on the discharge of patient: > 30 minutes         Allie Baer MD  Department of Hospital Medicine  [facility] - 3rd Floor Medical Telemetry

## 2022-12-14 NOTE — ASSESSMENT & PLAN NOTE
Resolved  Possible side effect to medication, holding all sedatives  CT and  CTA head negative   EEG unremarkable  Continue PT

## 2023-01-24 DIAGNOSIS — C34.31 SQUAMOUS CELL CARCINOMA OF BRONCHUS IN RIGHT LOWER LOBE: Primary | ICD-10-CM

## 2023-01-30 ENCOUNTER — LAB REQUISITION (OUTPATIENT)
Dept: LAB | Facility: HOSPITAL | Age: 88
End: 2023-01-30
Payer: MEDICARE

## 2023-01-30 DIAGNOSIS — I50.9 HEART FAILURE, UNSPECIFIED: ICD-10-CM

## 2023-01-30 LAB
DIGOXIN SERPL-MCNC: 0.7 NG/ML (ref 0.8–2)
FERRITIN SERPL-MCNC: 24.37 NG/ML (ref 4.63–204)
IRON SERPL-MCNC: 33 UG/DL (ref 50–170)

## 2023-01-30 PROCEDURE — 80162 ASSAY OF DIGOXIN TOTAL: CPT | Performed by: FAMILY MEDICINE

## 2023-01-30 PROCEDURE — 82728 ASSAY OF FERRITIN: CPT | Performed by: FAMILY MEDICINE

## 2023-01-30 PROCEDURE — 83540 ASSAY OF IRON: CPT | Performed by: FAMILY MEDICINE

## 2023-02-20 ENCOUNTER — HOSPITAL ENCOUNTER (OUTPATIENT)
Dept: RADIOLOGY | Facility: HOSPITAL | Age: 88
Discharge: HOME | End: 2023-02-20
Attending: RADIOLOGY
Payer: MEDICARE

## 2023-02-20 DIAGNOSIS — C34.31 SQUAMOUS CELL CARCINOMA OF BRONCHUS IN RIGHT LOWER LOBE: ICD-10-CM

## 2023-02-20 PROBLEM — N39.0 URINARY TRACT INFECTION: Status: RESOLVED | Noted: 2020-11-02 | Resolved: 2023-02-20

## 2023-02-20 PROCEDURE — A9552 F18 FDG: HCPCS

## 2023-02-27 PROBLEM — Z09 HOSPITAL DISCHARGE FOLLOW-UP: Status: RESOLVED | Noted: 2022-11-23 | Resolved: 2023-02-27

## 2023-03-09 ENCOUNTER — TELEPHONE (OUTPATIENT)
Dept: INTERNAL MEDICINE | Facility: CLINIC | Age: 88
End: 2023-03-09
Payer: MEDICARE

## 2023-04-26 ENCOUNTER — LAB REQUISITION (OUTPATIENT)
Dept: LAB | Facility: HOSPITAL | Age: 88
End: 2023-04-26
Payer: MEDICARE

## 2023-04-26 DIAGNOSIS — I10 ESSENTIAL (PRIMARY) HYPERTENSION: ICD-10-CM

## 2023-04-26 LAB
HCT VFR BLD AUTO: 33.9 % (ref 37–47)
HGB BLD-MCNC: 11 G/DL (ref 12–16)

## 2023-04-26 PROCEDURE — 85014 HEMATOCRIT: CPT | Performed by: FAMILY MEDICINE

## 2023-06-15 ENCOUNTER — HOSPITAL ENCOUNTER (EMERGENCY)
Facility: HOSPITAL | Age: 88
Discharge: HOME | End: 2023-06-15
Attending: EMERGENCY MEDICINE
Payer: MEDICARE

## 2023-06-15 VITALS
HEIGHT: 65 IN | BODY MASS INDEX: 20.83 KG/M2 | SYSTOLIC BLOOD PRESSURE: 123 MMHG | DIASTOLIC BLOOD PRESSURE: 77 MMHG | HEART RATE: 66 BPM | WEIGHT: 125 LBS | RESPIRATION RATE: 20 BRPM | OXYGEN SATURATION: 96 % | TEMPERATURE: 98 F

## 2023-06-15 DIAGNOSIS — S09.90XA CLOSED HEAD INJURY, INITIAL ENCOUNTER: ICD-10-CM

## 2023-06-15 DIAGNOSIS — T14.90XA TRAUMA: ICD-10-CM

## 2023-06-15 DIAGNOSIS — M25.579 ANKLE PAIN: ICD-10-CM

## 2023-06-15 DIAGNOSIS — S90.31XA CONTUSION OF RIGHT FOOT, INITIAL ENCOUNTER: Primary | ICD-10-CM

## 2023-06-15 DIAGNOSIS — S00.03XA SCALP HEMATOMA, INITIAL ENCOUNTER: ICD-10-CM

## 2023-06-15 LAB
ABORH RETYPE: NORMAL
ALBUMIN SERPL-MCNC: 3.2 G/DL (ref 3.4–4.8)
ALBUMIN/GLOB SERPL: 0.9 RATIO (ref 1.1–2)
ALP SERPL-CCNC: 106 UNIT/L (ref 40–150)
ALT SERPL-CCNC: 13 UNIT/L (ref 0–55)
APPEARANCE UR: CLEAR
APTT PPP: 40.8 SECONDS (ref 23.2–33.7)
AST SERPL-CCNC: 16 UNIT/L (ref 5–34)
BACTERIA #/AREA URNS AUTO: ABNORMAL /HPF
BASOPHILS # BLD AUTO: 0.04 X10(3)/MCL
BASOPHILS NFR BLD AUTO: 0.5 %
BILIRUB UR QL STRIP.AUTO: NEGATIVE MG/DL
BILIRUBIN DIRECT+TOT PNL SERPL-MCNC: 0.4 MG/DL
BUN SERPL-MCNC: 14 MG/DL (ref 9.8–20.1)
CALCIUM SERPL-MCNC: 8.7 MG/DL (ref 8.4–10.2)
CHLORIDE SERPL-SCNC: 106 MMOL/L (ref 98–107)
CO2 SERPL-SCNC: 26 MMOL/L (ref 23–31)
COLOR UR: YELLOW
CREAT SERPL-MCNC: 0.85 MG/DL (ref 0.55–1.02)
EOSINOPHIL # BLD AUTO: 0.07 X10(3)/MCL (ref 0–0.9)
EOSINOPHIL NFR BLD AUTO: 0.8 %
ERYTHROCYTE [DISTWIDTH] IN BLOOD BY AUTOMATED COUNT: 14.7 % (ref 11.5–17)
ETHANOL SERPL-MCNC: <10 MG/DL
GFR SERPLBLD CREATININE-BSD FMLA CKD-EPI: >60 MLS/MIN/1.73/M2
GLOBULIN SER-MCNC: 3.5 GM/DL (ref 2.4–3.5)
GLUCOSE SERPL-MCNC: 107 MG/DL (ref 82–115)
GLUCOSE UR QL STRIP.AUTO: NEGATIVE MG/DL
GROUP & RH: NORMAL
HCT VFR BLD AUTO: 41.3 % (ref 37–47)
HGB BLD-MCNC: 13.7 G/DL (ref 12–16)
IMM GRANULOCYTES # BLD AUTO: 0.02 X10(3)/MCL (ref 0–0.04)
IMM GRANULOCYTES NFR BLD AUTO: 0.2 %
INDIRECT COOMBS GEL: NORMAL
INR BLD: 1.16 (ref 0–1.3)
KETONES UR QL STRIP.AUTO: NEGATIVE MG/DL
LACTATE SERPL-SCNC: 1.4 MMOL/L (ref 0.5–2.2)
LEUKOCYTE ESTERASE UR QL STRIP.AUTO: ABNORMAL UNIT/L
LYMPHOCYTES # BLD AUTO: 2.08 X10(3)/MCL (ref 0.6–4.6)
LYMPHOCYTES NFR BLD AUTO: 24.6 %
MCH RBC QN AUTO: 30 PG (ref 27–31)
MCHC RBC AUTO-ENTMCNC: 33.2 G/DL (ref 33–36)
MCV RBC AUTO: 90.4 FL (ref 80–94)
MONOCYTES # BLD AUTO: 0.66 X10(3)/MCL (ref 0.1–1.3)
MONOCYTES NFR BLD AUTO: 7.8 %
NEUTROPHILS # BLD AUTO: 5.58 X10(3)/MCL (ref 2.1–9.2)
NEUTROPHILS NFR BLD AUTO: 66.1 %
NITRITE UR QL STRIP.AUTO: NEGATIVE
NRBC BLD AUTO-RTO: 0 %
PH UR STRIP.AUTO: 6 [PH]
PLATELET # BLD AUTO: 296 X10(3)/MCL (ref 130–400)
PMV BLD AUTO: 8.9 FL (ref 7.4–10.4)
POTASSIUM SERPL-SCNC: 4.2 MMOL/L (ref 3.5–5.1)
PROT SERPL-MCNC: 6.7 GM/DL (ref 5.8–7.6)
PROT UR QL STRIP.AUTO: NEGATIVE MG/DL
PROTHROMBIN TIME: 14.7 SECONDS (ref 12.5–14.5)
RBC # BLD AUTO: 4.57 X10(6)/MCL (ref 4.2–5.4)
RBC #/AREA URNS AUTO: <5 /HPF
RBC UR QL AUTO: NEGATIVE UNIT/L
SODIUM SERPL-SCNC: 140 MMOL/L (ref 136–145)
SP GR UR STRIP.AUTO: 1.01 (ref 1–1.03)
SPECIMEN OUTDATE: NORMAL
SQUAMOUS #/AREA URNS AUTO: <5 /HPF
UROBILINOGEN UR STRIP-ACNC: 0.2 MG/DL
WBC # SPEC AUTO: 8.45 X10(3)/MCL (ref 4.5–11.5)
WBC #/AREA URNS AUTO: 8 /HPF

## 2023-06-15 PROCEDURE — 85730 THROMBOPLASTIN TIME PARTIAL: CPT | Performed by: EMERGENCY MEDICINE

## 2023-06-15 PROCEDURE — 25000003 PHARM REV CODE 250: Performed by: EMERGENCY MEDICINE

## 2023-06-15 PROCEDURE — 99285 EMERGENCY DEPT VISIT HI MDM: CPT | Mod: 25

## 2023-06-15 PROCEDURE — 81001 URINALYSIS AUTO W/SCOPE: CPT | Performed by: EMERGENCY MEDICINE

## 2023-06-15 PROCEDURE — 82077 ASSAY SPEC XCP UR&BREATH IA: CPT | Performed by: EMERGENCY MEDICINE

## 2023-06-15 PROCEDURE — 85610 PROTHROMBIN TIME: CPT | Performed by: EMERGENCY MEDICINE

## 2023-06-15 PROCEDURE — 83605 ASSAY OF LACTIC ACID: CPT | Performed by: EMERGENCY MEDICINE

## 2023-06-15 PROCEDURE — 85025 COMPLETE CBC W/AUTO DIFF WBC: CPT | Performed by: EMERGENCY MEDICINE

## 2023-06-15 PROCEDURE — G0390 TRAUMA RESPONS W/HOSP CRITI: HCPCS

## 2023-06-15 PROCEDURE — 80053 COMPREHEN METABOLIC PANEL: CPT | Performed by: EMERGENCY MEDICINE

## 2023-06-15 PROCEDURE — 86900 BLOOD TYPING SEROLOGIC ABO: CPT | Performed by: EMERGENCY MEDICINE

## 2023-06-15 RX ORDER — ACETAMINOPHEN 325 MG/1
650 TABLET ORAL
Status: COMPLETED | OUTPATIENT
Start: 2023-06-15 | End: 2023-06-15

## 2023-06-15 RX ADMIN — ACETAMINOPHEN 650 MG: 325 TABLET ORAL at 05:06

## 2023-06-15 NOTE — ED PROVIDER NOTES
Encounter Date: 6/15/2023    SCRIBE #1 NOTE: I, Catina Sigala, am scribing for, and in the presence of,  Nika Dawkins MD. I have scribed the following portions of the note - Other sections scribed: HPI, ROS, PE, MDM.     History   No chief complaint on file.    87 yo female with a history of Afib and GERD presents to the ED via EMS from [facility] as a level 2 trauma following a fall today. Pt states that she tripped and fell over a table, hitting head on a chair. She complains of headache at the site of impact and R ankle pain. Denies LOC, CP, SOB, and abdominal pain. Currently on Eliquis.     The history is provided by the patient. No  was used.   Fall  The accident occurred today. The fall occurred while walking. She landed on A hard floor. There was no blood loss. The point of impact was the head. The pain is present in the head. There was No entrapment after the fall. There was No drug use involved in the accident. Associated symptoms include headaches. Pertinent negatives include no neck pain, no back pain, no fever, no numbness, no abdominal pain, no nausea, no vomiting and no loss of consciousness. Treatment on scene includes A backboard.   Review of patient's allergies indicates:   Allergen Reactions    Aspirin     Atropine     Clindamycin     Codeine     Darvon [propoxyphene]     Demerol [meperidine]     Lactose     Penicillins     Pentazocine     Talwin compound     Tramadol     Tylenol extended release      No past medical history on file.  No past surgical history on file.  No family history on file.     Review of Systems   Constitutional:  Negative for fever.   HENT:  Negative for nosebleeds.    Eyes:  Negative for photophobia and visual disturbance.   Respiratory:  Negative for cough and shortness of breath.    Cardiovascular:  Negative for chest pain.   Gastrointestinal:  Negative for abdominal pain, nausea and vomiting.   Genitourinary:  Negative for difficulty urinating.    Musculoskeletal:  Positive for arthralgias. Negative for back pain and neck pain.        R ankle pain.   Skin:  Positive for wound (hematoma posterior scalp).   Neurological:  Positive for headaches. Negative for loss of consciousness, syncope, speech difficulty, weakness and numbness.     Physical Exam     Initial Vitals [06/15/23 1307]   BP Pulse Resp Temp SpO2   120/71 72 19 97.9 °F (36.6 °C) 97 %      MAP       --         Physical Exam    Nursing note and vitals reviewed.  Constitutional: Vital signs are normal. She appears well-developed and well-nourished. Airway: Normal. Breathing: Normal. Circulation: Normal. She is not diaphoretic. Pulses:Radial palpable. She is cooperative.  Non-toxic appearance. No distress.   HENT:   Head: Normocephalic.   Hematoma to R posterior scalp. No bleeding.    Eyes: Pupils: Normal pupils. Conjunctivae, EOM and lids are normal. Pupils are equal, round, and reactive to light. Right pupil is round and reactive. Left pupil is round and reactive. Pupils are equal.   Pupils are 4 mm.    Neck: Trachea normal. Neck supple. No tracheal deviation present.   C-collar in place   Normal range of motion.  Cardiovascular:  Normal rate, regular rhythm and intact distal pulses.           Pulses:       Radial pulses are 2+ on the right side and 2+ on the left side.        Dorsalis pedis pulses are 2+ on the right side and 2+ on the left side.   Pulmonary/Chest: Breath sounds normal. No respiratory distress.   Abdominal: Abdomen is soft. Bowel sounds are normal. There is no abdominal tenderness. The pelvis is stable. There is no rebound and no guarding.   Musculoskeletal:         General: Tenderness present. Normal range of motion.      Cervical back: Normal range of motion and neck supple. No deformity, tenderness or bony tenderness. Normal.      Thoracic back: Normal. No deformity, tenderness or bony tenderness.      Lumbar back: Normal. No deformity or bony tenderness.      Right ankle:  Swelling present. Tenderness present over the lateral malleolus.      Comments: No C/T/L midline TTP, deformity or step-off. Pelvis stable, nontender. TTP with mild edema and contusion noted to right lateral ankle. No bony deformity to the ankle.     Neurological: She is alert and oriented to person, place, and time. She has normal strength. No cranial nerve deficit or sensory deficit. GCS score is 15. GCS eye subscore is 4. GCS verbal subscore is 5. GCS motor subscore is 6.   5/5 strength in all extremities.   Skin: Skin is warm, dry and intact. Capillary refill takes less than 2 seconds.       ED Course   Procedures  Labs Reviewed   COMPREHENSIVE METABOLIC PANEL - Abnormal; Notable for the following components:       Result Value    Albumin Level 3.2 (*)     Albumin/Globulin Ratio 0.9 (*)     All other components within normal limits   PROTIME-INR - Abnormal; Notable for the following components:    PT 14.7 (*)     All other components within normal limits   APTT - Abnormal; Notable for the following components:    PTT 40.8 (*)     All other components within normal limits   URINALYSIS, REFLEX TO URINE CULTURE - Abnormal; Notable for the following components:    Leukocyte Esterase, UA 1+ (*)     All other components within normal limits   URINALYSIS, MICROSCOPIC - Abnormal; Notable for the following components:    WBC, UA 8 (*)     All other components within normal limits   LACTIC ACID, PLASMA - Normal   ALCOHOL,MEDICAL (ETHANOL) - Normal   CBC W/ AUTO DIFFERENTIAL    Narrative:     The following orders were created for panel order CBC auto differential.  Procedure                               Abnormality         Status                     ---------                               -----------         ------                     CBC with Differential[[id number]]                            Final result                 Please view results for these tests on the individual orders.   CBC WITH DIFFERENTIAL   TYPE & SCREEN    ABORH RETYPE          Imaging Results              X-Ray Foot Complete Right (Final result)  Result time 06/15/23 17:52:46      Final result by Rajinder Rose MD (06/15/23 17:52:46)                   Impression:      There is no abnormality seen      Electronically signed by: Rajinder Rose  Date:    06/15/2023  Time:    17:52               Narrative:    EXAMINATION:  XR FOOT COMPLETE 3 VIEW RIGHT    CLINICAL HISTORY:  . Injury, unspecified, initial encounter    TECHNIQUE:  AP, lateral, and oblique views of the right foot were performed.    COMPARISON:  None    FINDINGS:  The bones and joints are in good anatomic alignment.  No fracture is seen.  No dislocation is seen.  No soft tissue abnormality is seen.                                       CT Lumbar Spine Without Contrast (Final result)  Result time 06/15/23 14:31:23      Final result by Trinity Acosta MD (06/15/23 14:31:23)                   Impression:      No appreciable acute fracture    Degenerative change the lumbar spine      Electronically signed by: Trinity Acosta  Date:    06/15/2023  Time:    14:31               Narrative:    EXAMINATION:  CT LUMBAR SPINE WITHOUT CONTRAST    CLINICAL HISTORY:  Low back pain, trauma;    TECHNIQUE:  Low dose helical acquired images with axial, sagittal and coronal reformations though the lumbar spine.  Contrast was not administered.    All CT scans at this location are performed using dose optimization techniques as appropriate to a performed exam including the following automated exposure control, adjustment of the mA and/or kV according to patient size and/or use of iterative reconstruction technique    DLP: 545 mGycm    COMPARISON:  No relevant prior available for comparison.    FINDINGS:  NUMBERING: Last fully formed disc space is designated L5-S1.    BONES: No appreciable acute fracture.  There is mild wedging and Schmorl's node at the superior endplate of T12 which appears chronic.  There are  Schmorl's nodes at the inferior endplate of L3 and superior endplate L4. Normal alignment.  The bones are demineralized.    L1-L2: Moderate disc space narrowing.  No significant central canal or neural foraminal stenosis.    L2-L3: Moderate disc space narrowing with diffuse annular bulge and vacuum phenomenon at the disc space.  There is facet arthropathy and ligamentum flavum hypertrophy.  There is mild central canal stenosis.  Mild bilat neural foraminal stenosis.    L3-L4: Moderate disc space narrowing with diffuse annular bulge.  Bilateral facet arthropathy and ligamentum flavum hypertrophy.  Mild central canal stenosis.  Moderate right and mild left neural foraminal stenosis.    L4-L5: Mild disc space narrowing.  Diffuse annular bulge.  Facet arthropathy and ligamentum flavum hypertrophy.  Moderate central canal stenosis.  Moderate right and mild left neural foraminal stenosis.    L5-S1: Mild disc bulging.  No significant central canal or neural foraminal stenosis.    SPINAL CANAL: No osseous narrowing of the spinal canal.    SOFT TISSUES: Aortoiliac atherosclerosis.  Possible right adnexal cyst versus fluid-filled bowel loop.  Hyperdensity and artifact at the upper pole right kidney may be related to stones.                                       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      Electronically signed by: Rajinder Rose  Date:    06/15/2023  Time:    14:13               Narrative:    EXAMINATION:  CT CERVICAL SPINE WITHOUT CONTRAST    CLINICAL HISTORY:  Neck trauma (Age >= 65y);Trauma;    TECHNIQUE:  Low dose axial images, sagittal and coronal reformations  were performed though the cervical spine.  Contrast was not administered. Automatic exposure control is utilized to reduce patient radiation exposure.    COMPARISON:  None    FINDINGS:  The vertebral body heights are well maintained.  The alignment is well maintained.  The bones are  osteoporotic.  There are degenerative changes seen throughout the cervical spine.  No evidence of acute fracture is seen.  No dislocation is seen.  Odontoid lateral masses appear grossly unremarkable.  No soft tissue abnormality is seen.  No retropharyngeal abnormality is seen.  Visualized portions of the airway appear grossly unremarkable.  There is evidence of vascular ectasias involving the vertebral artery at the level of C 5 on the right side.  There is also vascular ectasia involving the vertebral artery with significant widening of the vertebral foramen and extension into C6 on the right side.    Thoracic inlet shows some changes of emphysema and COPD and what appears to be some scarring.                                       CT Head Without Contrast (Final result)  Result time 06/15/23 14:20:40      Final result by Trinity Acosta MD (06/15/23 14:20:40)                   Impression:      Small scalp hematoma without appreciable acute intracranial abnormality    Periventricular and subcortical white matter changes most compatible with chronic small vessel ischemic disease.      Electronically signed by: Trinity Acosta  Date:    06/15/2023  Time:    14:20               Narrative:    EXAMINATION:  CT HEAD WITHOUT CONTRAST    CLINICAL HISTORY:  Head trauma, minor (Age >= 65y);Trauma;    TECHNIQUE:  Low dose axial CT images obtained throughout the head without intravenous contrast.  Axial, sagittal and coronal reconstructions were performed and interpreted.    DLP: 1090 mGycm    All CT scans at this location are performed using dose optimization techniques as appropriate to a performed exam including the following automated  exposure control, adjustment of the mA and/or kV according to patient size and/or use of iterative reconstruction technique    COMPARISON:  No relevant prior available for comparison.    FINDINGS:  BRAIN: Gray white differentiation is maintained. Periventricular and subcortical white matter changes most compatible with chronic small vessel ischemic disease.  Mild cerebral atrophy.  There are calcific atherosclerotic changes at the distal vertebral arteries and cavernous carotid arteries.  No hemorrhage. No edema. No mass effect or midline shift.  The posterior fossa and midline structures are unremarkable.    VENTRICLES: Normal in size and configuration.    EXTRA-AXIAL: No abnormal extra-axial collections.    BONES: Calvarium is intact.    SINUSES AND MASTOIDS: Visualized paranasal sinuses and mastoid air cells are clear.    OTHER: Small right parietooccipital scalp hematoma.                                       X-Ray Ankle Complete Right (Final result)  Result time 06/15/23 15:05:33      Final result by Marcelo Garcia MD (06/15/23 15:05:33)                   Impression:      No displaced fracture      Electronically signed by: Marcelo Garcia MD  Date:    06/15/2023  Time:    15:05               Narrative:    EXAMINATION:  Three views right ankle    CLINICAL HISTORY:  Fall    COMPARISON:  None    FINDINGS:  No displaced fracture or dislocation. No talar dome lesions. There is mild midfoot arthrosis.                                       X-Ray Pelvis Routine AP (Final result)  Result time 06/15/23 15:04:55      Final result by Marcelo Garcia MD (06/15/23 15:04:55)                   Impression:      No displaced fracture      Electronically signed by: Marcelo Garcia MD  Date:    06/15/2023  Time:    15:04               Narrative:    EXAMINATION:  One-view pelvis    CLINICAL HISTORY:  Ground level fall    COMPARISON:  None    FINDINGS:  No displaced fracture or dislocation.  Degenerative changes are present.                                        X-Ray Chest 1 View (Final result)  Result time 06/15/23 15:02:02      Final result by Neil Izquierdo MD (06/15/23 15:02:02)                   Impression:      Increase interstitial markings throughout.    Linear density in the right perihilar region might represent atelectatic changes and or fibrotic streaks.    Slight fullness of the right infrahilar region.    Otherwise no active pulmonary disease      Electronically signed by: Neil Izquierdo  Date:    06/15/2023  Time:    15:02               Narrative:    EXAMINATION:  XR CHEST 1 VIEW    CPT 70942    CLINICAL HISTORY:  r/o bleeding or hemorrhage;    FINDINGS:  Examination reveals mediastinal silhouette to be within normal limits cardiac silhouette is not enlarged there is increase in interstitial markings throughout both lung fields slightly more confluent in the right perihilar region some linear densities also identified in the right perihilar region might represent subsegmental atelectatic changes    There is some fullness of the right infrahilar region.    No other focal consolidative changes                                    X-Rays:   Independently Interpreted Readings:   Chest X-Ray: Atelectasis right perihilar region   Head CT: No hemorrhage.   Other Readings:  Pelvis x-ray without acute fracture or pelvic ring disruption.    Right ankle x-ray negative for acute fracture or dislocation.    Right foot x-ray negative for fracture on my review  Medications   acetaminophen tablet 650 mg (650 mg Oral Given 6/15/23 1737)     Medical Decision Making:   Initial Assessment:   88-year-old female presents after a mechanical fall.  She is on anticoagulation.  She has a scalp hematoma posteriorly on the right and is complaining of headache and right ankle pain.  GCS 15, neurologically intact, hemodynamically stable.  CT of the head and cervical spine along with x-rays of her chest, pelvis and right ankle ordered.  She is politely  refusing pain control at this time.  It will be given as needed.  Ice packs will also be applied as needed.  Reassess  Differential Diagnosis:   Closed head injury, scalp hematoma, intracranial hemorrhage, fracture, contusion, and others  Independently Interpreted Test(s):   I have ordered and independently interpreted X-rays - see prior notes.  Clinical Tests:   Lab Tests: Ordered  Radiological Study: Ordered        Scribe Attestation:   Scribe #1: I performed the above scribed service and the documentation accurately describes the services I performed. I attest to the accuracy of the note.    Attending Attestation:           Physician Attestation for Scribe:  Physician Attestation Statement for Scribe #1: I, Nika Dawkins MD, reviewed documentation, as scribed by Catina Sigala in my presence, and it is both accurate and complete.           ED Course as of 06/17/23 1508   Thu Jameson 15, 2023   1442 CTs all negative for acute traumatic findings.  X-rays also negative for traumatic findings. BP borderline low, gentle IV bolus ordered. O2 saturations documented as 92%, currently 96% RA. Reassess.  [RB]   1720 Reexamine. BP improved. Still complaining of right ankle pain, seems more like the lateral right foot. Xray ordered, ice applied, tylenol given per request.    [RB]   1800 Foot xray negative.  Patient feeling improved with pain medication.  Her son is now at the bedside.  Results discussed.  She is ready for discharge back to the nursing home.  I do not feel further emergent evaluation is warranted. RICE therapy and OTC medications for pain discussed.  She is able to ambulate without issue at this time.  She does use a walker at baseline.  Return precautions and PCP follow-up discussed and she is discharged. [RB]      ED Course User Index  [RB] Nika Dawkins MD                 Clinical Impression:   Final diagnoses:  [M25.579] Ankle pain  [T14.90XA] Trauma  [S90.31XA] Contusion of right foot, initial encounter  (Primary)  [S09.90XA] Closed head injury, initial encounter  [S00.03XA] Scalp hematoma, initial encounter        ED Disposition Condition    Discharge Stable          ED Prescriptions    None       Follow-up Information       Follow up With Specialties Details Why Contact Info    CAROLINA Aguilar MD Family Medicine Schedule an appointment as soon as possible for a visit in 1 day call tomorrow for reevaluation [address] [place] [id number]  [phone]      [facility] - Emergency Dept Emergency Medicine  If symptoms worsen, As needed [facility] [id number] [phone]             Nika Dawkins MD  06/17/23 4204

## 2023-08-15 DIAGNOSIS — R91.1 NODULE OF RIGHT LUNG: Primary | ICD-10-CM

## 2023-08-18 ENCOUNTER — HOSPITAL ENCOUNTER (OUTPATIENT)
Dept: RADIOLOGY | Facility: HOSPITAL | Age: 88
Discharge: HOME | End: 2023-08-18
Attending: NURSE PRACTITIONER
Payer: MEDICARE

## 2023-08-18 DIAGNOSIS — R91.1 NODULE OF RIGHT LUNG: ICD-10-CM

## 2023-08-18 PROCEDURE — 71250 CT THORAX DX C-: CPT | Mod: TC

## 2024-01-09 ENCOUNTER — HOSPITAL ENCOUNTER (OUTPATIENT)
Dept: RADIOLOGY | Facility: HOSPITAL | Age: 89
Discharge: HOME | End: 2024-01-09
Attending: INTERNAL MEDICINE
Payer: MEDICARE

## 2024-01-09 DIAGNOSIS — C34.90 MALIGNANT NEOPLASM OF LUNG, UNSPECIFIED LATERALITY, UNSPECIFIED PART OF LUNG: ICD-10-CM

## 2024-01-09 DIAGNOSIS — I26.99 PULMONARY EMBOLISM, UNSPECIFIED CHRONICITY, UNSPECIFIED PULMONARY EMBOLISM TYPE, UNSPECIFIED WHETHER ACUTE COR PULMONALE PRESENT: ICD-10-CM

## 2024-01-09 PROCEDURE — 71046 X-RAY EXAM CHEST 2 VIEWS: CPT | Mod: TC

## 2024-01-23 DIAGNOSIS — C34.31 SQUAMOUS CELL CARCINOMA OF BRONCHUS IN RIGHT LOWER LOBE: Primary | ICD-10-CM

## 2024-02-19 ENCOUNTER — HOSPITAL ENCOUNTER (OUTPATIENT)
Dept: RADIOLOGY | Facility: HOSPITAL | Age: 89
Discharge: HOME | End: 2024-02-19
Attending: RADIOLOGY
Payer: MEDICARE

## 2024-02-19 DIAGNOSIS — C34.31 SQUAMOUS CELL CARCINOMA OF BRONCHUS IN RIGHT LOWER LOBE: ICD-10-CM

## 2024-02-19 PROCEDURE — 78815 PET IMAGE W/CT SKULL-THIGH: CPT | Mod: TC,PS

## 2024-02-19 PROCEDURE — A9552 F18 FDG: HCPCS

## 2024-03-20 NOTE — ASSESSMENT & PLAN NOTE
Rate controlled  Restarted home digoxin and metoprolol per NG tube   Lovenox 1 mg/kg started for anticoagulation      Detail Level: Simple Post-Care Instructions: Patient given [facility] handout for post liquid nitrogen care. Show Aperture Variable?: Yes Render Note In Bullet Format When Appropriate: No Consent: The patient's consent was obtained including but not limited to risks of crusting, scabbing, blistering, scarring, darker or lighter pigmentary change, recurrence, incomplete removal and infection. Patient understands that if the lesion does not completely clear with this treatment they are to return for re-evaluation and possible biopsy.

## 2024-04-02 ENCOUNTER — HOSPITAL ENCOUNTER (EMERGENCY)
Facility: HOSPITAL | Age: 89
Discharge: HOME | End: 2024-04-02
Attending: EMERGENCY MEDICINE
Payer: MEDICARE

## 2024-04-02 ENCOUNTER — OFFICE VISIT (OUTPATIENT)
Dept: URGENT CARE | Facility: CLINIC | Age: 89
End: 2024-04-02
Payer: MEDICARE

## 2024-04-02 VITALS
TEMPERATURE: 98 F | DIASTOLIC BLOOD PRESSURE: 75 MMHG | BODY MASS INDEX: 22.99 KG/M2 | OXYGEN SATURATION: 94 % | HEIGHT: 65 IN | HEART RATE: 66 BPM | RESPIRATION RATE: 16 BRPM | WEIGHT: 138 LBS | SYSTOLIC BLOOD PRESSURE: 127 MMHG

## 2024-04-02 VITALS
HEART RATE: 63 BPM | BODY MASS INDEX: 21.63 KG/M2 | DIASTOLIC BLOOD PRESSURE: 65 MMHG | OXYGEN SATURATION: 93 % | RESPIRATION RATE: 20 BRPM | WEIGHT: 130 LBS | TEMPERATURE: 98 F | SYSTOLIC BLOOD PRESSURE: 146 MMHG

## 2024-04-02 DIAGNOSIS — R06.02 SHORTNESS OF BREATH: Primary | ICD-10-CM

## 2024-04-02 DIAGNOSIS — M25.511 ACUTE PAIN OF RIGHT SHOULDER: ICD-10-CM

## 2024-04-02 DIAGNOSIS — R07.9 CHEST PAIN, UNSPECIFIED TYPE: Primary | ICD-10-CM

## 2024-04-02 DIAGNOSIS — J44.9 CHRONIC OBSTRUCTIVE PULMONARY DISEASE, UNSPECIFIED COPD TYPE: ICD-10-CM

## 2024-04-02 DIAGNOSIS — R06.02 SHORTNESS OF BREATH: ICD-10-CM

## 2024-04-02 LAB
ALBUMIN SERPL-MCNC: 3.1 G/DL (ref 3.4–4.8)
ALBUMIN/GLOB SERPL: 0.7 RATIO (ref 1.1–2)
ALP SERPL-CCNC: 108 UNIT/L (ref 40–150)
ALT SERPL-CCNC: 9 UNIT/L (ref 0–55)
APTT PPP: 46.2 SECONDS (ref 23.2–33.7)
AST SERPL-CCNC: 13 UNIT/L (ref 5–34)
BASOPHILS # BLD AUTO: 0.06 X10(3)/MCL
BASOPHILS NFR BLD AUTO: 0.9 %
BILIRUB SERPL-MCNC: 0.4 MG/DL
BNP BLD-MCNC: 174.3 PG/ML
BUN SERPL-MCNC: 16.3 MG/DL (ref 9.8–20.1)
CALCIUM SERPL-MCNC: 9.3 MG/DL (ref 8.4–10.2)
CHLORIDE SERPL-SCNC: 111 MMOL/L (ref 98–107)
CO2 SERPL-SCNC: 20 MMOL/L (ref 23–31)
CREAT SERPL-MCNC: 0.93 MG/DL (ref 0.55–1.02)
DIGOXIN SERPL-MCNC: 0.7 NG/ML (ref 0.8–2)
EOSINOPHIL # BLD AUTO: 0.07 X10(3)/MCL (ref 0–0.9)
EOSINOPHIL NFR BLD AUTO: 1 %
ERYTHROCYTE [DISTWIDTH] IN BLOOD BY AUTOMATED COUNT: 16.6 % (ref 11.5–17)
GFR SERPLBLD CREATININE-BSD FMLA CKD-EPI: 59 MLS/MIN/1.73/M2
GLOBULIN SER-MCNC: 4.3 GM/DL (ref 2.4–3.5)
GLUCOSE SERPL-MCNC: 112 MG/DL (ref 82–115)
HCT VFR BLD AUTO: 38.9 % (ref 37–47)
HGB BLD-MCNC: 12.4 G/DL (ref 12–16)
IMM GRANULOCYTES # BLD AUTO: 0.02 X10(3)/MCL (ref 0–0.04)
IMM GRANULOCYTES NFR BLD AUTO: 0.3 %
INR PPP: 1.1
LYMPHOCYTES # BLD AUTO: 2.21 X10(3)/MCL (ref 0.6–4.6)
LYMPHOCYTES NFR BLD AUTO: 32 %
MCH RBC QN AUTO: 27.4 PG (ref 27–31)
MCHC RBC AUTO-ENTMCNC: 31.9 G/DL (ref 33–36)
MCV RBC AUTO: 86.1 FL (ref 80–94)
MONOCYTES # BLD AUTO: 0.56 X10(3)/MCL (ref 0.1–1.3)
MONOCYTES NFR BLD AUTO: 8.1 %
NEUTROPHILS # BLD AUTO: 3.99 X10(3)/MCL (ref 2.1–9.2)
NEUTROPHILS NFR BLD AUTO: 57.7 %
NRBC BLD AUTO-RTO: 0 %
OHS QRS DURATION: 108 MS
OHS QTC CALCULATION: 416 MS
PLATELET # BLD AUTO: 323 X10(3)/MCL (ref 130–400)
PMV BLD AUTO: 9.2 FL (ref 7.4–10.4)
POTASSIUM SERPL-SCNC: 3.8 MMOL/L (ref 3.5–5.1)
PROT SERPL-MCNC: 7.4 GM/DL (ref 5.8–7.6)
PROTHROMBIN TIME: 14.4 SECONDS (ref 12.5–14.5)
RBC # BLD AUTO: 4.52 X10(6)/MCL (ref 4.2–5.4)
SODIUM SERPL-SCNC: 142 MMOL/L (ref 136–145)
TROPONIN I SERPL-MCNC: <0.01 NG/ML (ref 0–0.04)
WBC # SPEC AUTO: 6.91 X10(3)/MCL (ref 4.5–11.5)

## 2024-04-02 PROCEDURE — 85730 THROMBOPLASTIN TIME PARTIAL: CPT | Performed by: NURSE PRACTITIONER

## 2024-04-02 PROCEDURE — 85610 PROTHROMBIN TIME: CPT | Performed by: NURSE PRACTITIONER

## 2024-04-02 PROCEDURE — 93005 ELECTROCARDIOGRAM TRACING: CPT

## 2024-04-02 PROCEDURE — 99213 OFFICE O/P EST LOW 20 MIN: CPT | Mod: ,,,

## 2024-04-02 PROCEDURE — 84484 ASSAY OF TROPONIN QUANT: CPT | Performed by: NURSE PRACTITIONER

## 2024-04-02 PROCEDURE — 96376 TX/PRO/DX INJ SAME DRUG ADON: CPT | Mod: 59

## 2024-04-02 PROCEDURE — 80053 COMPREHEN METABOLIC PANEL: CPT | Performed by: NURSE PRACTITIONER

## 2024-04-02 PROCEDURE — 99285 EMERGENCY DEPT VISIT HI MDM: CPT | Mod: 25

## 2024-04-02 PROCEDURE — 96374 THER/PROPH/DIAG INJ IV PUSH: CPT

## 2024-04-02 PROCEDURE — 93010 ELECTROCARDIOGRAM REPORT: CPT | Mod: ,,, | Performed by: INTERNAL MEDICINE

## 2024-04-02 PROCEDURE — 80162 ASSAY OF DIGOXIN TOTAL: CPT | Performed by: NURSE PRACTITIONER

## 2024-04-02 PROCEDURE — 63600175 PHARM REV CODE 636 W HCPCS: Performed by: EMERGENCY MEDICINE

## 2024-04-02 PROCEDURE — 25500020 PHARM REV CODE 255: Performed by: EMERGENCY MEDICINE

## 2024-04-02 PROCEDURE — 83880 ASSAY OF NATRIURETIC PEPTIDE: CPT | Performed by: NURSE PRACTITIONER

## 2024-04-02 PROCEDURE — 85025 COMPLETE CBC W/AUTO DIFF WBC: CPT | Performed by: NURSE PRACTITIONER

## 2024-04-02 RX ORDER — PREDNISONE 50 MG/1
50 TABLET ORAL DAILY
Qty: 5 TABLET | Refills: 0 | Status: SHIPPED | OUTPATIENT
Start: 2024-04-02 | End: 2024-04-07

## 2024-04-02 RX ORDER — FENTANYL CITRATE 50 UG/ML
50 INJECTION, SOLUTION INTRAMUSCULAR; INTRAVENOUS
Status: COMPLETED | OUTPATIENT
Start: 2024-04-02 | End: 2024-04-02

## 2024-04-02 RX ORDER — PREDNISONE 50 MG/1
50 TABLET ORAL DAILY
Qty: 5 TABLET | Refills: 0 | Status: SHIPPED | OUTPATIENT
Start: 2024-04-02 | End: 2024-04-02

## 2024-04-02 RX ORDER — METHOCARBAMOL 750 MG/1
750 TABLET, FILM COATED ORAL EVERY 6 HOURS PRN
Qty: 30 TABLET | Refills: 0 | Status: SHIPPED | OUTPATIENT
Start: 2024-04-02 | End: 2024-04-02

## 2024-04-02 RX ORDER — METHOCARBAMOL 750 MG/1
750 TABLET, FILM COATED ORAL EVERY 6 HOURS PRN
Qty: 30 TABLET | Refills: 0 | Status: SHIPPED | OUTPATIENT
Start: 2024-04-02

## 2024-04-02 RX ADMIN — FENTANYL CITRATE 50 MCG: 50 INJECTION, SOLUTION INTRAMUSCULAR; INTRAVENOUS at 12:04

## 2024-04-02 RX ADMIN — FENTANYL CITRATE 50 MCG: 50 INJECTION, SOLUTION INTRAMUSCULAR; INTRAVENOUS at 04:04

## 2024-04-02 RX ADMIN — IOPAMIDOL 100 ML: 755 INJECTION, SOLUTION INTRAVENOUS at 03:04

## 2024-04-02 NOTE — FIRST PROVIDER EVALUATION
Medical screening examination initiated.  I have conducted a focused provider triage encounter, findings are as follows:    Brief history of present illness:  Patient states SOB and right sided upper back pain.     There were no vitals filed for this visit.    Pertinent physical exam:  Awake, alert    Brief workup plan:  Labs, Imaging, EKG    Preliminary workup initiated; this workup will be continued and followed by the physician or advanced practice provider that is assigned to the patient when roomed.

## 2024-04-02 NOTE — PROGRESS NOTES
"Subjective:      Patient ID: Judith Ramirez is a 89 y.o. female.    Vitals:  height is 5' 5" (1.651 m) and weight is 62.6 kg (138 lb). Her oral temperature is 97.9 °F (36.6 °C). Her blood pressure is 127/75 and her pulse is 66. Her respiration is 16 and oxygen saturation is 94% (abnormal).     Chief Complaint: Shoulder Pain (Right shoulder pain, shortness of breath since last night. O2 was 91% at home. )    An 88 y/o female with a pmh of lung cancer, PE, SVT, pulmonary emphysema, and A.fib presents to the clinic with her family for c/o sudden onset of right shoulder pain, shortness of breath and low oxygenation saturation of 91% at home. The discomfort stated last night but has worsened this am. She denies any known trauma or fall. The pain is worse with inspiration and movement. She denies any known fever but does report some coughing. She denies any numbness, tingling, or chest pain.     Shoulder Pain   Pertinent negatives include no fever.       Constitution: Negative for fever.   HENT: Negative.     Neck: neck negative.   Cardiovascular:  Positive for sob on exertion.   Respiratory:  Positive for cough and shortness of breath. Negative for wheezing.    Genitourinary: Negative.    Musculoskeletal:  Positive for pain.      Objective:     Physical Exam   Constitutional: She is oriented to person, place, and time. She appears well-developed. She is cooperative.  Non-toxic appearance. She does not appear ill. No distress.   HENT:   Head: Normocephalic and atraumatic.   Ears:   Right Ear: Hearing and external ear normal.   Left Ear: Hearing and external ear normal.   Mouth/Throat: Oropharynx is clear and moist and mucous membranes are normal.   Eyes: Conjunctivae and lids are normal.   Neck: Trachea normal and phonation normal. Neck supple. No edema present. No erythema present.   Cardiovascular: Normal rate.   Pulmonary/Chest: She is in respiratory distress (labored breathing noted). She has no decreased breath " sounds. She exhibits tenderness.   Abdominal: Normal appearance.   Neurological: She is alert and oriented to person, place, and time. She exhibits normal muscle tone.   Skin: Skin is warm, dry, intact, not diaphoretic and no rash.   Psychiatric: Her speech is normal and behavior is normal. Mood normal.   Nursing note and vitals reviewed.      Assessment:     1. Shortness of breath    2. Acute pain of right shoulder        Plan:       Shortness of breath    Acute pain of right shoulder      As discussed, it is recommended that you present to the ER now for further evaluation to prevent a delay in care.   Offered transport via EMS but patient/family declines despite informed risks including death.  Opts for private transport via personal vehicle with a family member .

## 2024-04-02 NOTE — ED PROVIDER NOTES
Encounter Date: 4/2/2024    SCRIBE #1 NOTE: I, Jo Cruz, am scribing for, and in the presence of,  Marin Tomlinson MD. I have scribed the following portions of the note - the EKG reading. Other sections scribed: HPI, ROS, PE.       History     Chief Complaint   Patient presents with    Shoulder Pain     Right sided back pain that started last night, worse this morning, hurts more with movement. Denies cough/congestion. C/o of SOB. No cardiac hx.  Pt presented to  this morning with complaint of SOB and reported O2 sat of 91% at home. Hx of PE, SVT, and lung CA     89 year old female with a hx of COPD, afib on eliquis, CAD, HTN, and HLD presents to the ED for worsening right sided upper back pain that began yesterday. The patient reports feeling short of breath and says the back pain is similar to when she was diagnosed with pleurisy. It worsens when she takes deep breaths or moves her right arm or torso. She reports taking tylenol around 4:30 this morning without relief. She denies cough, fever, nausea, vomiting, lower extremity swelling, or neck pain. She reports rhinorrhea due to seasonal allergies but denies recent illnesses. Her PCP is Dr. Huang Aguilar.    The history is provided by the patient. No  was used.   Back Pain   This is a new problem. The current episode started yesterday. The problem occurs constantly. The problem has been gradually worsening. The pain is associated with no known injury. The symptoms are aggravated by bending. Pertinent negatives include no chest pain, no fever, no dysuria and no weakness. Treatments tried: tylenol. The treatment provided no relief.     Review of patient's allergies indicates:   Allergen Reactions    Aspirin Hives    Aspirin     Aspirin-acetaminophen     Atropine     Atropine-demerol     Clindamycin Hives    Clindamycin     Clindamycin-jagruti per-hyalur na     Codeine     Darvon compound 32 [propoxyphene-asa-caffeine]     Darvon  [propoxyphene]     Demerol [meperidine]     Lactose     Meperidine Hives    Penicillin Hives    Penicillins     Pentazocine     Pentazocine lactate Hives    Propoxyphene Hives    Talwin compound     Tramadol Hives    Tramadol     Tylenol extended release     Codeine Nausea And Vomiting and Other (See Comments)     Past Medical History:   Diagnosis Date    Anemia     Atrial fibrillation     COPD (chronic obstructive pulmonary disease)     Coronary artery disease     GERD (gastroesophageal reflux disease)     Hyperlipidemia     Hypertension     Seizure disorder      Past Surgical History:   Procedure Laterality Date    CHOLECYSTECTOMY      ESOPHAGOGASTRODUODENOSCOPY W/ PEG N/A 12/12/2022    Procedure: EGD, WITH PEG TUBE INSERTION;  Surgeon: Jo Ann Montenegro III, MD;  Location: [facility] ENDOSCOPY;  Service: Gastroenterology;  Laterality: N/A;    EYE SURGERY      gum grafting      rotator cuff right      thyroid goiter      total kne left      trigger finger thumb right       No family history on file.  Social History     Tobacco Use    Smoking status: Former     Types: Cigarettes    Smokeless tobacco: Never    Tobacco comments:     quit 29 yrs ago   Substance Use Topics    Alcohol use: Not Currently    Drug use: Never     Review of Systems   Constitutional:  Negative for fever.   HENT:  Negative for sore throat.    Respiratory:  Positive for shortness of breath. Negative for cough.    Cardiovascular:  Negative for chest pain and leg swelling.   Gastrointestinal:  Negative for diarrhea, nausea and vomiting.   Genitourinary:  Negative for difficulty urinating, dyspareunia, dysuria, menstrual problem, vaginal bleeding and vaginal discharge.   Musculoskeletal:  Positive for back pain (right sided upper back). Negative for neck pain.   Skin:  Negative for rash.   Neurological:  Negative for weakness.   Hematological:  Does not bruise/bleed easily.       Physical Exam     Initial Vitals [04/02/24 1119]   BP Pulse Resp Temp  "SpO2   100/65 67 18 98.2 °F (36.8 °C) 96 %      MAP       --         Physical Exam    Nursing note and vitals reviewed.  Constitutional: She appears well-developed and well-nourished.   Frail appearing elderly female    HENT:   Head: Normocephalic and atraumatic.   Mouth/Throat: Oropharynx is clear and moist.   Eyes: Pupils are equal, round, and reactive to light.   Neck: Neck supple.   Normal range of motion.  Cardiovascular:  Normal rate, regular rhythm, normal heart sounds and intact distal pulses.           Pulmonary/Chest: Breath sounds normal.   Abdominal: Abdomen is soft. Bowel sounds are normal. There is no abdominal tenderness. There is no rebound.   Musculoskeletal:      Cervical back: Normal range of motion and neck supple.      Comments: Questionable mildly "winged," right scapula      Neurological: She is alert and oriented to person, place, and time. She has normal strength. No sensory deficit. GCS score is 15.   Skin: Skin is warm and dry.   Psychiatric: She has a normal mood and affect.         ED Course   Procedures  Labs Reviewed   COMPREHENSIVE METABOLIC PANEL - Abnormal; Notable for the following components:       Result Value    Chloride 111 (*)     Carbon Dioxide 20 (*)     Albumin Level 3.1 (*)     Globulin 4.3 (*)     Albumin/Globulin Ratio 0.7 (*)     All other components within normal limits   B-TYPE NATRIURETIC PEPTIDE - Abnormal; Notable for the following components:    Natriuretic Peptide 174.3 (*)     All other components within normal limits   APTT - Abnormal; Notable for the following components:    PTT 46.2 (*)     All other components within normal limits   CBC WITH DIFFERENTIAL - Abnormal; Notable for the following components:    MCHC 31.9 (*)     All other components within normal limits   DIGOXIN LEVEL - Abnormal; Notable for the following components:    Digoxin Level 0.7 (*)     All other components within normal limits   TROPONIN I - Normal   PROTIME-INR - Normal   CBC W/ AUTO " DIFFERENTIAL    Narrative:     The following orders were created for panel order CBC Auto Differential.  Procedure                               Abnormality         Status                     ---------                               -----------         ------                     CBC with Differential[[id number]]       Abnormal            Final result                 Please view results for these tests on the individual orders.     EKG Readings: (Independently Interpreted)   Initial Reading: No STEMI. Rhythm: Atrial Fibrillation. Heart Rate: 73. Ectopy: No Ectopy. Conduction: Normal. T Waves: Normal. Axis: Normal. Clinical Impression: Normal Sinus Rhythm and Atrial Fibrillation   Done at 11:15, low voltages, non-specific ST changes      ECG Results              EKG 12-lead (Final result)        Collection Time Result Time QRS Duration OHS QTC Calculation    04/02/24 11:15:48 04/02/24 18:08:14 108 416                     Final result by Interface, Lab In [facility] (04/02/24 18:08:18)                   Narrative:    Test Reason : R06.02,    Vent. Rate : 073 BPM     Atrial Rate : 000 BPM     P-R Int : 000 ms          QRS Dur : 108 ms      QT Int : 378 ms       P-R-T Axes : 000 093 -77 degrees     QTc Int : 416 ms    Atrial fibrillation  Low voltage QRS  Right bundle branch block  Marked ST abnormality, possible inferior subendocardial injury  Abnormal ECG  Confirmed by Fredy Mercer MD ([id number]) on 4/2/2024 6:08:13 PM    Referred By: [email or web address]   SELF           Confirmed By:Fredy Mercer MD                                  Imaging Results              CTA Chest Non-Coronary (PE Studies) (Final result)  Result time 04/02/24 15:48:03      Final result by Harsha Frances MD (04/02/24 15:48:03)                   Impression:      1. Negative for pulmonary thromboembolic disease.  2. Similar appearance of the lungs compared to the February 2024 PET-CT.      Electronically signed by: Harsha Frances  Date:    04/02/2024  Time:    15:48                Narrative:    EXAMINATION:  CTA CHEST NON CORONARY (PE STUDIES)    CLINICAL HISTORY:  Pulmonary embolism (PE) suspected, unknown D-dimer;    TECHNIQUE:  Helical acquisition through the chest with IV contrast targeting the pulmonary arteries. Multiplanar and 3D MIP reconstructed images were provided for review.  mGycm. Automatic exposure control, adjustment of mA/kV or iterative reconstruction technique was used to reduce radiation.    COMPARISON:  PET-CT 19 February 2024.    FINDINGS:  There is good opacification of the pulmonary arterial tree. There is no evidence of pulmonary thromboembolism.  There is no aortic dissection.    Heart is not significantly enlarged.  There are coronary artery calcifications.  No pericardial effusion.    Similar area of consolidation in the superior segment of the right lower lobe.  Similar peripheral opacities scattered elsewhere in both lungs.  Fairly severe emphysema.    No newly enlarged mediastinal lymph nodes are seen.    No acute findings in the imaged upper abdomen.  There are no acute osseous findings.                                       X-Ray Chest PA And Lateral (Final result)  Result time 04/02/24 11:38:04      Final result by Keyshawn Bennett MD (04/02/24 11:38:04)                   Impression:      Findings of chronic lung disease with right mid lung zone opacification grossly unchanged.      Electronically signed by: Keyshawn Bennett  Date:    04/02/2024  Time:    11:38               Narrative:    EXAMINATION:  XR CHEST PA AND LATERAL    CLINICAL HISTORY:  Shortness of breath;    TECHNIQUE:  Two views of the chest    COMPARISON:  01/09/2024    FINDINGS:  Right mid lung zone opacification appears grossly unchanged from prior.    The cardiomediastinal silhouette is within normal limits.    No acute osseous abnormality.                                       Medications   fentaNYL injection 50 mcg (50 mcg Intravenous Given 4/2/24 1210)   iopamidoL  (ISOVUE-370) injection 100 mL (100 mLs Intravenous Given 4/2/24 1531)   fentaNYL injection 50 mcg (50 mcg Intravenous Given 4/2/24 1600)     Medical Decision Making  The differential diagnosis includes, but is not limited to, pneumonia, pleurisy, chest wall pain, rib fracture, PE, ACS     Amount and/or Complexity of Data Reviewed  Labs: ordered.     Details: Unremarkable CBC, CMP and troponin  Radiology: ordered.     Details: CTA reveals no changes from PET-CT done February 2024  ECG/medicine tests: ordered and independent interpretation performed.     Details: No STEMI. Rhythm: Atrial Fibrillation. Heart Rate: 73. Ectopy: No Ectopy. Conduction: Normal. T Waves: Normal. Axis: Normal. Clinical Impression: Normal Sinus Rhythm and Atrial Fibrillation   Done at 11:15, low voltages, non-specific ST changes     Risk  Prescription drug management.            Scribe Attestation:   Scribe #1: I performed the above scribed service and the documentation accurately describes the services I performed. I attest to the accuracy of the note.    Attending Attestation:           Physician Attestation for Scribe:  Physician Attestation Statement for Scribe #1: I, Marin Tomlinson MD, reviewed documentation, as scribed by Jo Cruz in my presence, and it is both accurate and complete.             ED Course as of 04/02/24 1810 Tue Apr 02, 2024   1807 Patient without any new findings on her CT scan.  Patient and family are aware of the other findings on the PET-CT.  Patient still having occasional spasm type pain, but otherwise unremarkable.  Will discharge home on steroids along with symptomatic treatment for the pain.  Once the fentanyl wore off, the patient's sats improved in the ED [MP]      ED Course User Index  [MP] Marin Tomlinson MD                           Clinical Impression:  Final diagnoses:  [R06.02] Shortness of breath  [R07.9] Chest pain, unspecified type (Primary)  [J44.9] Chronic obstructive pulmonary  disease, unspecified COPD type          ED Disposition Condition    Discharge Stable          ED Prescriptions       Medication Sig Dispense Start Date End Date Auth. Provider    methocarbamoL (ROBAXIN) 750 MG Tab Take 1 tablet (750 mg total) by mouth every 6 (six) hours as needed (Pain or spasms). 30 tablet 4/2/2024 -- Marin Tomlinson MD    predniSONE (DELTASONE) 50 MG Tab Take 1 tablet (50 mg total) by mouth once daily. for 5 days 5 tablet 4/2/2024 4/7/2024 Marin Tomlinson MD          Follow-up Information       Follow up With Specialties Details Why Contact Info    CAROLINA Aguilar MD Family Medicine In 3 days  [address] [place] [id number] [phone]      [facility] - Emergency Dept Emergency Medicine Go to  If symptoms worsen, As needed [facility] [id number] [phone]             Marin Tomlinson MD  04/02/24 1221

## (undated) DEVICE — KIT CANIST SUCTION 1200CC

## (undated) DEVICE — COLLECTION SPECIMEN NEPTUNE

## (undated) DEVICE — TUBING O2 FEMALE CONN 13FT

## (undated) DEVICE — RETRIEVER RESCUENET 230X3X5.5

## (undated) DEVICE — LIDOCAINE HCI VISCOUS SOL 2%

## (undated) DEVICE — SOL IRRI STRL WATER 1000ML

## (undated) DEVICE — KIT SURGICAL COLON .25 1.1OZ

## (undated) DEVICE — ROTH NET PLATINUM

## (undated) DEVICE — MANIFOLD 4 PORT

## (undated) DEVICE — Device

## (undated) DEVICE — TIP SUCTION YANKAUER

## (undated) DEVICE — TOWEL OR DISP STRL BLUE 4/PK